# Patient Record
Sex: FEMALE | Race: WHITE | Employment: FULL TIME | URBAN - METROPOLITAN AREA
[De-identification: names, ages, dates, MRNs, and addresses within clinical notes are randomized per-mention and may not be internally consistent; named-entity substitution may affect disease eponyms.]

---

## 2017-02-01 ENCOUNTER — TELEPHONE (OUTPATIENT)
Dept: OBGYN | Facility: CLINIC | Age: 32
End: 2017-02-01

## 2017-02-01 DIAGNOSIS — Z32.01 PREGNANCY EXAMINATION OR TEST, POSITIVE RESULT: Primary | ICD-10-CM

## 2017-02-01 DIAGNOSIS — Z33.1 PREGNANCY, INCIDENTAL: Primary | ICD-10-CM

## 2017-02-02 ENCOUNTER — TELEPHONE (OUTPATIENT)
Dept: OBGYN | Facility: CLINIC | Age: 32
End: 2017-02-02

## 2017-02-02 DIAGNOSIS — Z32.01 PREGNANCY TEST POSITIVE: Primary | ICD-10-CM

## 2017-02-02 DIAGNOSIS — Z32.01 PREGNANCY EXAMINATION OR TEST, POSITIVE RESULT: ICD-10-CM

## 2017-02-02 LAB — B-HCG SERPL-ACNC: 7508 IU/L

## 2017-02-02 PROCEDURE — 36415 COLL VENOUS BLD VENIPUNCTURE: CPT | Performed by: OBSTETRICS & GYNECOLOGY

## 2017-02-02 PROCEDURE — 84702 CHORIONIC GONADOTROPIN TEST: CPT | Performed by: OBSTETRICS & GYNECOLOGY

## 2017-02-02 NOTE — TELEPHONE ENCOUNTER
Discussed result of early IUP with patient    Recommend repeat us weekly until determine if heartbeat forms     Also discussed with patient  Can check quants q 48 hours and if doubling then likely normal   Pregnancy, optional and patient  Will consider.      Patient  Questions answered   Dr. Vianca Almeida,     Obstetrics and Gynecology  [facility] [place] [facility] [place]

## 2017-02-02 NOTE — TELEPHONE ENCOUNTER
Pt had ultrasound done today and was told they only saw a gestational sac. Pt states she spoke to  and she was told to have a repeat U/S done next week and to have a QHCG done today and again in 48 hours.  Orders placed. SHAMIR Avalos RN

## 2017-02-04 DIAGNOSIS — Z32.01 PREGNANCY EXAMINATION OR TEST, POSITIVE RESULT: ICD-10-CM

## 2017-02-04 LAB — B-HCG SERPL-ACNC: NORMAL IU/L

## 2017-02-04 PROCEDURE — 36415 COLL VENOUS BLD VENIPUNCTURE: CPT | Performed by: OBSTETRICS & GYNECOLOGY

## 2017-02-04 PROCEDURE — 84702 CHORIONIC GONADOTROPIN TEST: CPT | Performed by: OBSTETRICS & GYNECOLOGY

## 2017-02-10 ENCOUNTER — RADIANT APPOINTMENT (OUTPATIENT)
Dept: ULTRASOUND IMAGING | Facility: CLINIC | Age: 32
End: 2017-02-10
Attending: FAMILY MEDICINE
Payer: COMMERCIAL

## 2017-02-10 DIAGNOSIS — O41.8X10 SUBCHORIONIC HEMORRHAGE, FIRST TRIMESTER: Primary | ICD-10-CM

## 2017-02-10 DIAGNOSIS — O46.8X1 SUBCHORIONIC HEMORRHAGE, FIRST TRIMESTER: Primary | ICD-10-CM

## 2017-02-10 DIAGNOSIS — Z32.01 PREGNANCY EXAMINATION OR TEST, POSITIVE RESULT: ICD-10-CM

## 2017-02-10 PROCEDURE — 76801 OB US < 14 WKS SINGLE FETUS: CPT | Performed by: OBSTETRICS & GYNECOLOGY

## 2017-02-14 ENCOUNTER — TELEPHONE (OUTPATIENT)
Dept: OBGYN | Facility: CLINIC | Age: 32
End: 2017-02-14

## 2017-02-22 ENCOUNTER — PRENATAL OFFICE VISIT (OUTPATIENT)
Dept: NURSING | Facility: CLINIC | Age: 32
End: 2017-02-22
Payer: COMMERCIAL

## 2017-02-22 DIAGNOSIS — Z34.01 ENCOUNTER FOR SUPERVISION OF NORMAL FIRST PREGNANCY IN FIRST TRIMESTER: Primary | ICD-10-CM

## 2017-02-22 LAB
ABO + RH BLD: NORMAL
ABO + RH BLD: NORMAL
BLD GP AB SCN SERPL QL: NORMAL
BLOOD BANK CMNT PATIENT-IMP: NORMAL
ERYTHROCYTE [DISTWIDTH] IN BLOOD BY AUTOMATED COUNT: 13.5 % (ref 10–15)
HBV SURFACE AG SERPL QL IA: NONREACTIVE
HCT VFR BLD AUTO: 43.7 % (ref 35–47)
HGB BLD-MCNC: 14.4 G/DL (ref 11.7–15.7)
HIV 1+2 AB+HIV1 P24 AG SERPL QL IA: NORMAL
MCH RBC QN AUTO: 31.4 PG (ref 26.5–33)
MCHC RBC AUTO-ENTMCNC: 33 G/DL (ref 31.5–36.5)
MCV RBC AUTO: 95 FL (ref 78–100)
PLATELET # BLD AUTO: 370 10E9/L (ref 150–450)
RBC # BLD AUTO: 4.59 10E12/L (ref 3.8–5.2)
SPECIMEN EXP DATE BLD: NORMAL
WBC # BLD AUTO: 9.3 10E9/L (ref 4–11)

## 2017-02-22 PROCEDURE — 99207 ZZC NO CHARGE NURSE ONLY: CPT

## 2017-02-22 PROCEDURE — 86850 RBC ANTIBODY SCREEN: CPT | Performed by: FAMILY MEDICINE

## 2017-02-22 PROCEDURE — 86762 RUBELLA ANTIBODY: CPT | Performed by: FAMILY MEDICINE

## 2017-02-22 PROCEDURE — 85027 COMPLETE CBC AUTOMATED: CPT | Performed by: FAMILY MEDICINE

## 2017-02-22 PROCEDURE — 86780 TREPONEMA PALLIDUM: CPT | Performed by: FAMILY MEDICINE

## 2017-02-22 PROCEDURE — 36415 COLL VENOUS BLD VENIPUNCTURE: CPT | Performed by: FAMILY MEDICINE

## 2017-02-22 PROCEDURE — 87389 HIV-1 AG W/HIV-1&-2 AB AG IA: CPT | Performed by: FAMILY MEDICINE

## 2017-02-22 PROCEDURE — 87086 URINE CULTURE/COLONY COUNT: CPT | Performed by: FAMILY MEDICINE

## 2017-02-22 PROCEDURE — 86900 BLOOD TYPING SEROLOGIC ABO: CPT | Performed by: FAMILY MEDICINE

## 2017-02-22 PROCEDURE — 86901 BLOOD TYPING SEROLOGIC RH(D): CPT | Performed by: FAMILY MEDICINE

## 2017-02-22 PROCEDURE — 87340 HEPATITIS B SURFACE AG IA: CPT | Performed by: FAMILY MEDICINE

## 2017-02-22 NOTE — MR AVS SNAPSHOT
After Visit Summary   2/22/2017    Joann Patterson    MRN: [id number]           Patient Information     Date Of Birth          1985        Visit Information        Provider Department      2/22/2017 8:00 AM  PRENATAL NURSE [facility]        Today's Diagnoses     Encounter for supervision of normal first pregnancy in first trimester    -  1       Follow-ups after your visit        Your next 10 appointments already scheduled     Feb 28, 2017  3:30 PM CST   Ultrasound with OXUS1   [facility]    [address] [id number] [phone]            Mar 08, 2017  3:30 PM CST   New Prenatal with Vianca Almeida,    [facility]    [address]  [facility] [address] [phone]              Who to contact     If you have questions or need follow up information about today's clinic visit or your schedule please contact [facility] directly at [phone].  Normal or non-critical lab and imaging results will be communicated to you by Snippetshart, letter or phone within 4 business days after the clinic has received the results. If you do not hear from us within 7 days, please contact the clinic through [employer] or phone. If you have a critical or abnormal lab result, we will notify you by phone as soon as possible.  Submit refill requests through [email or web address] or call your pharmacy and they will forward the refill request to us. Please allow 3 business days for your refill to be completed.          Additional Information About Your Visit        MyChart Information     [email or web address] gives you secure access to your electronic health record. If you see a primary care provider, you can also send messages to your care team and make appointments. If you have questions, please call your primary care clinic.  If you do not have a  primary care provider, please call [phone] and they will assist you.        Care EveryWhere ID     This is your Care EveryWhere ID. This could be used by other organizations to access your [place] medical records  [id number]        Your Vitals Were     Last Period                   12/14/2016            Blood Pressure from Last 3 Encounters:   03/30/16 108/88   03/15/16 126/88   03/08/16 112/76    Weight from Last 3 Encounters:   03/30/16 191 lb 9.6 oz (86.9 kg)   03/15/16 190 lb 3.2 oz (86.3 kg)   03/08/16 190 lb 9.6 oz (86.5 kg)              We Performed the Following     ABO/RH TYPE AND SCREEN     Anti Treponema     CBC WITH PLATELETS     Hepatitis B surface antigen     HIV Antigen Antibody Combo     Rubella Antibody IgG Quantitative     Urine Culture Aerobic Bacterial        Primary Care Provider Office Phone # Fax #    Vianca Jacobo Juan Pablo, [phone]       [place] [address] [place] [address]        Thank you!     Thank you for choosing [facility]  for your care. Our goal is always to provide you with excellent care. Hearing back from our patients is one way we can continue to improve our services. Please take a few minutes to complete the written survey that you may receive in the mail after your visit with us. Thank you!             Your Updated Medication List - Protect others around you: Learn how to safely use, store and throw away your medicines at www.disposemymeds.org.          This list is accurate as of: 2/22/17 11:54 AM.  Always use your most recent med list.                   Brand Name Dispense Instructions for use    albuterol 108 (90 BASE) MCG/ACT Inhaler    PROAIR HFA/PROVENTIL HFA/VENTOLIN HFA     Inhale 2 puffs into the lungs every 6 hours       Calcium Carb-Cholecalciferol 1000-800 MG-UNIT Tabs    CALCIUM 1000 + D    100 tablet    Take 1 tablet by mouth daily TAKE WITH FOOD, FOR BONE HEALTH AND FOR VITAMIN D  SUPPLEMENTATION       cholecalciferol 77718 UNITS capsule    VITAMIN D3    40 capsule    Take 1 capsule (50,000 Units) by mouth every 14 days SIG: TAKE 1 CAP TWICE A WEEK FOR 2 WEEKS, THEN ONE CAP EVERY OTHER WEEK, INDICATION: TO TREAT VITAMIN D DEFICIENCY (1ST AND 15TH OF EACH MONTH)       clomiPHENE 50 MG tablet    CLOMID    10 tablet    Take 1 tablet (50 mg) by mouth daily       Cyanocobalamin 5000 MCG/ML Liqd    B-12 SUPER STRENGTH    2 Bottle    Place 1 mL under the tongue daily FOR VITAMIN B12 SUPPLEMENTATION, PLEASE PLACE UNDER THE TONGUE       docusate sodium 100 MG tablet    COLACE    60 tablet    Take 100 mg by mouth daily       fexofenadine 180 MG tablet    ALLEGRA    90 tablet    Take 1 tablet by mouth daily. INDICATION: TO CONTROL NASAL ALLERGIES       * ibuprofen 600 MG tablet    IBU    90 tablet    Take 1 tablet by mouth every 6 hours as needed for pain.       * ibuprofen 800 MG tablet    ADVIL/MOTRIN    90 tablet    Take 1 tablet (800 mg) by mouth every 8 hours as needed for moderate pain       Multi-vitamin Tabs tablet   Generic drug:  multivitamin, therapeutic with minerals      Take 1 tablet by mouth daily.       NEXPLANON 68 MG Impl   Generic drug:  etonogestrel     1 each    1 each (68 mg) by Subdermal route once INSERTED APRIL 2015.       ondansetron 8 MG tablet    ZOFRAN    21 tablet    Take 1 tablet (8 mg) by mouth every 8 hours as needed for nausea       oxyCODONE 5 MG IR tablet    ROXICODONE    30 tablet    Take 1 tablet (5 mg) by mouth every 4 hours as needed for moderate to severe pain       * Notice:  This list has 2 medication(s) that are the same as other medications prescribed for you. Read the directions carefully, and ask your doctor or other care provider to review them with you.

## 2017-02-22 NOTE — NURSING NOTE
"NEW PRENATAL EDUCATION  The following encounter information was actually performed during a Nurseprenatal education class on 2/22/17 by SHAMIR Avalos RN  and entered at a later date.  Patient given information for prenatal education along with \"The Expectant Family\" booklet.   Pt states she did receive her flu shot this season.    "

## 2017-02-23 LAB
BACTERIA SPEC CULT: NORMAL
MICRO REPORT STATUS: NORMAL
RUBV IGG SERPL IA-ACNC: 13 IU/ML
SPECIMEN SOURCE: NORMAL
T PALLIDUM IGG+IGM SER QL: NEGATIVE

## 2017-03-08 ENCOUNTER — PRENATAL OFFICE VISIT (OUTPATIENT)
Dept: OBGYN | Facility: CLINIC | Age: 32
End: 2017-03-08
Payer: COMMERCIAL

## 2017-03-08 VITALS
WEIGHT: 184 LBS | HEIGHT: 63 IN | BODY MASS INDEX: 32.6 KG/M2 | TEMPERATURE: 98.6 F | SYSTOLIC BLOOD PRESSURE: 104 MMHG | DIASTOLIC BLOOD PRESSURE: 70 MMHG

## 2017-03-08 DIAGNOSIS — Z34.00 SUPERVISION OF NORMAL FIRST PREGNANCY, ANTEPARTUM: Primary | ICD-10-CM

## 2017-03-08 DIAGNOSIS — R11.0 NAUSEA: ICD-10-CM

## 2017-03-08 PROCEDURE — 87591 N.GONORRHOEAE DNA AMP PROB: CPT | Performed by: FAMILY MEDICINE

## 2017-03-08 PROCEDURE — 87491 CHLMYD TRACH DNA AMP PROBE: CPT | Performed by: FAMILY MEDICINE

## 2017-03-08 PROCEDURE — 99207 ZZC FIRST OB VISIT: CPT | Performed by: FAMILY MEDICINE

## 2017-03-08 RX ORDER — PROCHLORPERAZINE MALEATE 10 MG
10 TABLET ORAL EVERY 6 HOURS PRN
Qty: 40 TABLET | Refills: 1 | Status: SHIPPED | OUTPATIENT
Start: 2017-03-08 | End: 2017-09-20

## 2017-03-08 NOTE — NURSING NOTE
"12w0d    Chief Complaint   Patient presents with     Prenatal Care     NPN--pap not due--had US 2/28/17       Initial /70  Temp 98.6  F (37  C) (Oral)  Ht 5' 3\" (1.6 m)  Wt 184 lb (83.5 kg)  LMP 12/14/2016  BMI 32.59 kg/m2 Estimated body mass index is 32.59 kg/(m^2) as calculated from the following:    Height as of this encounter: 5' 3\" (1.6 m).    Weight as of this encounter: 184 lb (83.5 kg).  Medication Reconciliation: complete   Lulu Gonzalez CMA      "

## 2017-03-08 NOTE — PATIENT INSTRUCTIONS
Return to clinic:  every 4 weeks till 30 weeks, then every 2 weeks till 36 weeks, then weekly till delivery    Dr. Vianca Almeida,     Obstetrics and Gynecology  [facility] [place] [facility] [place]

## 2017-03-08 NOTE — PROGRESS NOTES
Joann Patterson is a 31 year old  @ 10w2d wks Estimated Date of Delivery: Oct 2, 2017 who presents to the clinic for an new ob visit.       Estimated Date of Delivery: Oct 2, 2017   Reviewed nurse intake visit on 17  Concerns: She notes that she miscarried very early in December, not requiring surgery prior to this pregnancy. She notes that the baby's father is excited about the pregnancy.   She notes that she has been vary nauseous, and has been trying vitamin B, Unisom, and Zofran. She notes that the Zofran was going well, but for the past week she has been even more nauseous. She has been eating mostly saltines and BLT sandwiches. She has been using flinstone vitamins because she is not tolerating her prenatal vitamins. Additionally, she has been constipated. She has been trying not to take medication, but has been using a fiber gummy daily and has been trying to eat high fiber foods, but her nausea is preventing her from doing this. She also has been using metamucil when she can feel the constipation come on, will try Miralax on day 3 of no BM, and then has tried a women's stool softener and even has needed to do 2 suppositories. She also notes that she has been having a lot of breast tenderness as well.     Patient Active Problem List   Diagnosis     CARDIOVASCULAR SCREENING; LDL GOAL LESS THAN 160     Intermittent asthma     Endometriosis     Vitamin D deficiency     Vitamin B12 deficiency without anemia     Supervision of normal first pregnancy     Past Medical History   Diagnosis Date     Asthma      Seasonal     Chronic pain      Abdominal pain with mensturation.     Endometriosis 2008     Other chronic pain      endometriosis     PONV (postoperative nausea and vomiting)      Past Surgical History   Procedure Laterality Date     Gyn surgery  2009     laparoscopic with CO2 laser     Head & neck surgery  2005     wisdom teeth extraction     Ent surgery  2010     left nasal suction catarization &  blood vessel removal     Davinci pelvic procedure  11/6/2012     Procedure: DAVINCI PELVIC PROCEDURE;  Davinci Assisted Laparoscopic Endometriosis Ressection, chromotubation;  Surgeon: Vianca Almeida DO;  Location:  OR     Nexplanon  8/20/15     Davinci pelvic procedure Right 3/15/2016     Procedure: DAVINCI PELVIC PROCEDURE;  Surgeon: Vianca Almeida DO;  Location: [facility] OR     Current Outpatient Prescriptions   Medication Sig Dispense Refill     prochlorperazine (COMPAZINE) 10 MG tablet Take 1 tablet (10 mg) by mouth every 6 hours as needed for nausea or vomiting 40 tablet 1     ondansetron (ZOFRAN) 8 MG tablet Take 1 tablet (8 mg) by mouth every 8 hours as needed for nausea 21 tablet 3     docusate sodium (COLACE) 100 MG tablet Take 100 mg by mouth daily 60 tablet 1     ====================================================  PERSONAL/SOCIAL HISTORY  Social History     Social History     Marital status: Single     Spouse name: N/A     Number of children: 0     Years of education: N/A     Occupational History      [employer]     Social History Main Topics     Smoking status: Former Smoker     Packs/day: 0.25     Years: 10.00     Types: Cigarettes     Quit date: 9/20/2012     Smokeless tobacco: Never Used     Alcohol use 0.0 oz/week     0 Standard drinks or equivalent per week      Comment: 1 glass of wine every other day     Drug use: No     Sexual activity: Not Currently     Partners: Male     Birth control/ protection: Implant      Comment: Nexplanon 8/20/15     Other Topics Concern     Blood Transfusions No     Social History Narrative    Joann lives alone      =====================================================   REVIEW OF SYSTEMS  C: NEGATIVE for fever, chills, change in weight  I: NEGATIVE for worrisome rashes, moles or lesions  E: NEGATIVE for vision changes or irritation  ENT: NEGATIVE for ear, mouth and throat problems  R: NEGATIVE for significant cough or SOB  B: NEGATIVE  "for masses or discharge POSITIVE for breast tenderness  CV: NEGATIVE for chest pain, palpitations or peripheral edema  GI: NEGATIVE for abdominal pain or heartburn POSITIVE for nausea and vomiting, and constipation   : NEGATIVE for unusual urinary or vaginal symptoms. POSITIVE For increased urinary frequency   M: NEGATIVE for significant arthralgias or myalgia  N: NEGATIVE for weakness, dizziness or paresthesias  P: NEGATIVE for changes in mood or affect    This document serves as a record of the services and decisions personally performed and made by Dr. Vianca Almeida DO. It was created on her behalf by Adenike Glez, a trained medical scribe. The creation of this document is based the provider's statements to the medical scribe.  Adenike Glez March 8, 2017 3:34 PM     ====================================================  PHYSICAL EXAM:  /70  Temp 98.6  F (37  C) (Oral)  Ht 5' 3\" (1.6 m)  Wt 184 lb (83.5 kg)  LMP 12/14/2016  BMI 32.59 kg/m2        GENERAL:  Pleasant pregnant female, alert, well groomed.  SKIN:  Warm and dry, without lesions or rashes  HEAD: Symmetrical features.  EYES:  PERRLA,   MOUTH:  Buccal mucosa pink, moist without lesions.    NECK:  Thyroid without enlargement and nodules.  Lymph nodes not palpable.   LUNGS:  Clear to auscultation.  BREAST:  Symmetrical.  No dominant, fixed or suspicious masses are noted.  No skin or nipple changes or axillary nodes.  Self exam is taught and encouraged.  Nipples everted.      HEART:  RRR without murmur.  ABDOMEN: Soft without masses , tenderness or organomegaly.  No CVA tenderness. No scars noted..   MUSCULOSKELETAL:  Full range of motion  EXTREMITIES:  No edema. No significant varicosities.   GENITALIA:  BUS WNL, no lesions noted   VAGINA:  Pink, normal rugae and discharge normal and physiologic,   CERVIX:  smooth, without discharge or CMT and nulliparous os,   firm/ closed 4 cm long.  UTERUS: Anteverted, nontender 12 weeks in size.  ADNEXA: " Without masses or tenderness  PELVIS:   Adequate, Pelvis proven to -pelvis not tested.    =========================================  ICSI Routine Prenatal Carfe Guideline  ASSESSMENT/PLAN:  Joann Patterson is a 31 year old  @ 10w2d wks EGA with EDC Estimated Date of Delivery: Oct 2, 2017 who presents to the clinic for an new ob visit.         1) Intrauterine pregnancy:  Nausea, will try compazine, zofran has not been effective. I would like her to supplement magnesium as well.   2) Fetal heart tones 178  3) Prenatal labs normal and pending   4) EDUCATION :    RECOMMENDED WEIGHT GAIN: 25-35 lbs.  Instructed on best evidence for: weight gain for her BMI for pregnancy; healthy diet and foods to avoid; exercise and activity during pregnancy;avoiding exposure to toxoplasmosis; and maintenance of a generally healthy lifestyle.   Discussed the harms, benefits, side effects and alternative therapies for current prescribed and OTC medications.  5) Ultrasound by bedside   6) Return to clinic in 4 weeks   7) Counseled about screening tests (1st trimester screen and targeted anatomy scan and AFP and quad screen if first trimester screening not done) and invasive definitive testing (chorionic villus sampling and genetic amniocentesis).  Patient wishes to discuss the screening with the father.     The information in this document, created by the medical scribe for me, accurately reflects the services I personally performed and the decisions made by me. I have reviewed and approved this document for accuracy prior to leaving the patient care area.  3/8/2017 3:34 PM           Dr. Vianca Almeida, DO    OB/GYN   [facility]

## 2017-03-08 NOTE — MR AVS SNAPSHOT
After Visit Summary   3/8/2017    Joann Patterson    MRN: [id number]           Patient Information     Date Of Birth          1985        Visit Information        Provider Department      3/8/2017 3:30 PM Vianca Almeida, DO [facility]        Today's Diagnoses     Supervision of normal first pregnancy, antepartum    -  1    Nausea          Care Instructions    Return to clinic:  every 4 weeks till 30 weeks, then every 2 weeks till 36 weeks, then weekly till delivery    Dr. Vianca Almeida, DO    Obstetrics and Gynecology  [facility]               Follow-ups after your visit        Follow-up notes from your care team     Return in about 4 weeks (around 4/5/2017).      Who to contact     If you have questions or need follow up information about today's clinic visit or your schedule please contact [facility] directly at [phone].  Normal or non-critical lab and imaging results will be communicated to you by Sideris Pharmaceuticalshart, letter or phone within 4 business days after the clinic has received the results. If you do not hear from us within 7 days, please contact the clinic through S[employer]shart or phone. If you have a critical or abnormal lab result, we will notify you by phone as soon as possible.  Submit refill requests through [employer] or call your pharmacy and they will forward the refill request to us. Please allow 3 business days for your refill to be completed.          Additional Information About Your Visit        MyChart Information     [employer] gives you secure access to your electronic health record. If you see a primary care provider, you can also send messages to your care team and make appointments. If you have questions, please call your primary care clinic.  If you do not have a primary care provider, please call [phone] and they will assist you.        Care EveryWhere ID     This is your Care EveryWhere ID. This could be  "used by other organizations to access your [place] medical records  [id number]        Your Vitals Were     Temperature Height Last Period BMI (Body Mass Index)          98.6  F (37  C) (Oral) 5' 3\" (1.6 m) 12/14/2016 32.59 kg/m2         Blood Pressure from Last 3 Encounters:   03/08/17 104/70   03/30/16 108/88   03/15/16 126/88    Weight from Last 3 Encounters:   03/08/17 184 lb (83.5 kg)   03/30/16 191 lb 9.6 oz (86.9 kg)   03/15/16 190 lb 3.2 oz (86.3 kg)              We Performed the Following     CHLAMYDIA TRACHOMATIS PCR     NEISSERIA GONORRHOEA PCR          Today's Medication Changes          These changes are accurate as of: 3/8/17  3:48 PM.  If you have any questions, ask your nurse or doctor.               Start taking these medicines.        Dose/Directions    prochlorperazine 10 MG tablet   Commonly known as:  COMPAZINE   Used for:  Supervision of normal first pregnancy, antepartum, Nausea   Started by:  Vianca Almeida DO        Dose:  10 mg   Take 1 tablet (10 mg) by mouth every 6 hours as needed for nausea or vomiting   Quantity:  40 tablet   Refills:  1            Where to get your medicines      These medications were sent to [facility] [place] [address]     Phone:  [phone]     prochlorperazine 10 MG tablet                Primary Care Provider Office Phone # Fax #    Vianca Almeida [phone]       [facility] [address] [place] [id number]        Thank you!     Thank you for choosing [facility]  for your care. Our goal is always to provide you with excellent care. Hearing back from our patients is one way we can continue to improve our services. Please take a few minutes to complete the written survey that you may receive in the mail after your visit with us. Thank you!             Your Updated Medication List - Protect " others around you: Learn how to safely use, store and throw away your medicines at www.disposemymeds.org.          This list is accurate as of: 3/8/17  3:48 PM.  Always use your most recent med list.                   Brand Name Dispense Instructions for use    docusate sodium 100 MG tablet    COLACE    60 tablet    Take 100 mg by mouth daily       ondansetron 8 MG tablet    ZOFRAN    21 tablet    Take 1 tablet (8 mg) by mouth every 8 hours as needed for nausea       prochlorperazine 10 MG tablet    COMPAZINE    40 tablet    Take 1 tablet (10 mg) by mouth every 6 hours as needed for nausea or vomiting

## 2017-03-09 LAB
C TRACH DNA SPEC QL NAA+PROBE: NORMAL
N GONORRHOEA DNA SPEC QL NAA+PROBE: NORMAL
SPECIMEN SOURCE: NORMAL
SPECIMEN SOURCE: NORMAL

## 2017-03-20 ENCOUNTER — MYC MEDICAL ADVICE (OUTPATIENT)
Dept: OBGYN | Facility: CLINIC | Age: 32
End: 2017-03-20

## 2017-03-20 DIAGNOSIS — R11.0 NAUSEA: Primary | ICD-10-CM

## 2017-03-20 RX ORDER — METOCLOPRAMIDE 10 MG/1
10 TABLET ORAL
Qty: 120 TABLET | Refills: 1 | Status: SHIPPED | OUTPATIENT
Start: 2017-03-20 | End: 2017-04-12

## 2017-04-12 ENCOUNTER — PRENATAL OFFICE VISIT (OUTPATIENT)
Dept: OBGYN | Facility: CLINIC | Age: 32
End: 2017-04-12
Payer: COMMERCIAL

## 2017-04-12 VITALS
WEIGHT: 180.5 LBS | SYSTOLIC BLOOD PRESSURE: 100 MMHG | HEIGHT: 63 IN | DIASTOLIC BLOOD PRESSURE: 60 MMHG | BODY MASS INDEX: 31.98 KG/M2

## 2017-04-12 DIAGNOSIS — Z34.00 SUPERVISION OF NORMAL FIRST PREGNANCY, ANTEPARTUM: Primary | ICD-10-CM

## 2017-04-12 PROCEDURE — 99207 ZZC PRENATAL VISIT: CPT | Performed by: FAMILY MEDICINE

## 2017-04-12 NOTE — MR AVS SNAPSHOT
After Visit Summary   4/12/2017    Joann Patterson    MRN: [id number]           Patient Information     Date Of Birth          1985        Visit Information        Provider Department      4/12/2017 2:45 PM Vianca Almeida, DO [facility]        Today's Diagnoses     Supervision of normal first pregnancy, antepartum    -  1      Care Instructions    They will call you with us schedule   Return to clinic in 4 weeks     Dr. Vianca Almeida, DO    Obstetrics and Gynecology  [facility] [place]               Follow-ups after your visit        Follow-up notes from your care team     Return in about 4 weeks (around 5/10/2017).      Future tests that were ordered for you today     Open Future Orders        Priority Expected Expires Ordered    US OB > 14 Weeks Complete Single Routine  4/12/2018 4/12/2017            Who to contact     If you have questions or need follow up information about today's clinic visit or your schedule please contact [facility] directly at [phone].  Normal or non-critical lab and imaging results will be communicated to you by MyChart, letter or phone within 4 business days after the clinic has received the results. If you do not hear from us within 7 days, please contact the clinic through Dibbzhart or phone. If you have a critical or abnormal lab result, we will notify you by phone as soon as possible.  Submit refill requests through [employer] or call your pharmacy and they will forward the refill request to us. Please allow 3 business days for your refill to be completed.          Additional Information About Your Visit        MyChart Information     [employer] gives you secure access to your electronic health record. If you see a primary care provider, you can also send messages to your care team and make appointments. If you have questions, please call your primary care clinic.  If you do not have a primary care  "provider, please call [phone] and they will assist you.        Care EveryWhere ID     This is your Care EveryWhere ID. This could be used by other organizations to access your [place] medical records  [id number]        Your Vitals Were     Height Last Period BMI (Body Mass Index)             5' 3\" (1.6 m) 12/14/2016 31.97 kg/m2          Blood Pressure from Last 3 Encounters:   04/12/17 100/60   03/08/17 104/70   03/30/16 108/88    Weight from Last 3 Encounters:   04/12/17 180 lb 8 oz (81.9 kg)   03/08/17 184 lb (83.5 kg)   03/30/16 191 lb 9.6 oz (86.9 kg)                 Today's Medication Changes          These changes are accurate as of: 4/12/17  3:08 PM.  If you have any questions, ask your nurse or doctor.               Stop taking these medicines if you haven't already. Please contact your care team if you have questions.     metoclopramide 10 MG tablet   Commonly known as:  REGLAN                    Primary Care Provider Office Phone # Fax #    Vianca Almeida,  [phone]       [facility] [address] [place] [address]        Thank you!     Thank you for choosing [facility]  for your care. Our goal is always to provide you with excellent care. Hearing back from our patients is one way we can continue to improve our services. Please take a few minutes to complete the written survey that you may receive in the mail after your visit with us. Thank you!             Your Updated Medication List - Protect others around you: Learn how to safely use, store and throw away your medicines at www.disposemymeds.org.          This list is accurate as of: 4/12/17  3:08 PM.  Always use your most recent med list.                   Brand Name Dispense Instructions for use    docusate sodium 100 MG tablet    COLACE    60 tablet    Take 100 mg by mouth daily       ondansetron 8 MG tablet    ZOFRAN    21 tablet    Take 1 tablet (8 mg) by mouth every 8 hours as " needed for nausea       prochlorperazine 10 MG tablet    COMPAZINE    40 tablet    Take 1 tablet (10 mg) by mouth every 6 hours as needed for nausea or vomiting

## 2017-04-12 NOTE — PATIENT INSTRUCTIONS
They will call you with us schedule   Return to clinic in 4 weeks     Dr. Vianca Almeida, DO    Obstetrics and Gynecology  [facility] [place] [facility] [place]

## 2017-04-12 NOTE — NURSING NOTE
"Chief Complaint   Patient presents with     Prenatal Care     still having nausea--headaches--bad constipation--taking colace and it works       Initial /60  Ht 5' 3\" (1.6 m)  Wt 180 lb 8 oz (81.9 kg)  LMP 12/14/2016  BMI 31.97 kg/m2 Estimated body mass index is 31.97 kg/(m^2) as calculated from the following:    Height as of this encounter: 5' 3\" (1.6 m).    Weight as of this encounter: 180 lb 8 oz (81.9 kg).  Medication Reconciliation: complete   Lulu Gonzalez CMA      "

## 2017-04-12 NOTE — PROGRESS NOTES
CC: Joann Patterson is a 31 year old female here for routine prenatal visit @ 15w2d Estimated Date of Delivery: Oct 2, 2017   HPI: she notes that she is doing well, but notes that today she is more nauseous than normal and has vomited a couple of times. Some days are better than other. She declines  testing, except for US. She also notes that she has been using colace and miralax for her constipation. She continues to use her fiber gummies. She has been having some pulling and pressure in her abdomen.     This document serves as a record of the services and decisions personally performed and made by Dr. Vianca Almeida DO. It was created on her behalf by Adenike Glez, a trained medical scribe. The creation of this document is based the provider's statements to the medical scribe.  Adenike Glez 2017 2:45 PM    See OB flowsheet    No vaginal bleeding, no LOF, no contractions     ASSESSMENT/PLAN:  1) nausea continues, rx previously provided.   2) colace and miralax for constipation  3) otherwise doing well  4) Follow up in 4 weeks     The information in this document, created by the medical scribe for me, accurately reflects the services I personally performed and the decisions made by me. I have reviewed and approved this document for accuracy prior to leaving the patient care area.  2017 2:45 PM         Dr. Vianca Almeida DO

## 2017-05-12 ENCOUNTER — RADIANT APPOINTMENT (OUTPATIENT)
Dept: ULTRASOUND IMAGING | Facility: CLINIC | Age: 32
End: 2017-05-12
Attending: FAMILY MEDICINE
Payer: COMMERCIAL

## 2017-05-12 DIAGNOSIS — Z34.00 SUPERVISION OF NORMAL FIRST PREGNANCY, ANTEPARTUM: ICD-10-CM

## 2017-05-12 PROCEDURE — 76805 OB US >/= 14 WKS SNGL FETUS: CPT | Performed by: OBSTETRICS & GYNECOLOGY

## 2017-05-16 ENCOUNTER — PRE VISIT (OUTPATIENT)
Dept: MATERNAL FETAL MEDICINE | Facility: CLINIC | Age: 32
End: 2017-05-16

## 2017-05-16 ENCOUNTER — TELEPHONE (OUTPATIENT)
Dept: OBGYN | Facility: CLINIC | Age: 32
End: 2017-05-16

## 2017-05-16 NOTE — TELEPHONE ENCOUNTER
Spoke to pt.  She had received a call from the clinic.  I was not able to see who called her, but we went over u/s results and the reason for referral to [facility].  Pt understands and has appt tomorrow.    Rosana Tucker R.N.

## 2017-05-17 ENCOUNTER — HOSPITAL ENCOUNTER (OUTPATIENT)
Dept: ULTRASOUND IMAGING | Facility: CLINIC | Age: 32
Discharge: HOME | End: 2017-05-17
Attending: FAMILY MEDICINE | Admitting: FAMILY MEDICINE
Payer: COMMERCIAL

## 2017-05-17 ENCOUNTER — OFFICE VISIT (OUTPATIENT)
Dept: MATERNAL FETAL MEDICINE | Facility: CLINIC | Age: 32
End: 2017-05-17
Attending: FAMILY MEDICINE
Payer: COMMERCIAL

## 2017-05-17 ENCOUNTER — PRENATAL OFFICE VISIT (OUTPATIENT)
Dept: OBGYN | Facility: CLINIC | Age: 32
End: 2017-05-17
Payer: COMMERCIAL

## 2017-05-17 VITALS
BODY MASS INDEX: 31.96 KG/M2 | WEIGHT: 180.4 LBS | HEIGHT: 63 IN | SYSTOLIC BLOOD PRESSURE: 110 MMHG | DIASTOLIC BLOOD PRESSURE: 68 MMHG

## 2017-05-17 DIAGNOSIS — Z34.02 ENCOUNTER FOR SUPERVISION OF NORMAL FIRST PREGNANCY IN SECOND TRIMESTER: Primary | ICD-10-CM

## 2017-05-17 DIAGNOSIS — Z03.73 SUSPECTED FETAL ANOMALY NOT FOUND: Primary | ICD-10-CM

## 2017-05-17 DIAGNOSIS — B37.31 CANDIDIASIS OF VULVA AND VAGINA: ICD-10-CM

## 2017-05-17 DIAGNOSIS — O26.90 PREGNANCY RELATED CONDITION, UNSPECIFIED TRIMESTER: ICD-10-CM

## 2017-05-17 DIAGNOSIS — Z36.3 ENCOUNTER FOR ROUTINE SCREENING FOR MALFORMATION USING ULTRASONICS: ICD-10-CM

## 2017-05-17 PROCEDURE — 76811 OB US DETAILED SNGL FETUS: CPT

## 2017-05-17 PROCEDURE — 99207 ZZC PRENATAL VISIT: CPT | Performed by: FAMILY MEDICINE

## 2017-05-17 RX ORDER — FLUCONAZOLE 150 MG/1
150 TABLET ORAL
Qty: 4 TABLET | Refills: 3 | Status: SHIPPED | OUTPATIENT
Start: 2017-05-17 | End: 2020-07-08

## 2017-05-17 NOTE — MR AVS SNAPSHOT
After Visit Summary   5/17/2017    Joann Patterson    MRN: [id number]           Patient Information     Date Of Birth          1985        Visit Information        Provider Department      5/17/2017 10:45 AM Radha Finn MD [facility] Maternal Fetal Medicine [facility]        Today's Diagnoses     Suspected fetal anomaly not found    -  1    Encounter for routine screening for malformation using ultrasonics           Follow-ups after your visit        Your next 10 appointments already scheduled     May 17, 2017  2:15 PM CDT   ESTABLISHED PRENATAL with Vianca Almeida,    [facility]    [address]  [facility] [address]   [phone]            Jun 14, 2017  3:00 PM CDT   ESTABLISHED PRENATAL with Vianca Almeida DO   [facility]    [address]  [facility] [address]   [phone]            Jul 12, 2017  3:00 PM CDT   ESTABLISHED PRENATAL with Vianca Almeida DO   [facility]    [address]  [facility] [address]   [phone]              Future tests that were ordered for you today     Open Future Orders        Priority Expected Expires Ordered    M US Comprehensive Single Routine  3/16/2018 5/16/2017            Who to contact     If you have questions or need follow up information about today's clinic visit or your schedule please contact [facility] MATERNAL FETAL MEDICINE [facility] directly at [phone].  Normal or non-critical lab and imaging results will be communicated to you by MyChart, letter or phone within 4 business days after the clinic has received the results. If you do not hear from us within 7 days, please contact the clinic through MyChart or phone. If you have a critical or abnormal lab result, we will notify you by phone as soon as possible.  Submit refill requests  through [employer] or call your pharmacy and they will forward the refill request to us. Please allow 3 business days for your refill to be completed.          Additional Information About Your Visit        [employer] Information     [employer] gives you secure access to your electronic health record. If you see a primary care provider, you can also send messages to your care team and make appointments. If you have questions, please call your primary care clinic.  If you do not have a primary care provider, please call [phone] and they will assist you.        Care EveryWhere ID     This is your Care EveryWhere ID. This could be used by other organizations to access your [place] medical records  [id number]        Your Vitals Were     Last Period                   12/14/2016            Blood Pressure from Last 3 Encounters:   04/12/17 100/60   03/08/17 104/70   03/30/16 108/88    Weight from Last 3 Encounters:   04/12/17 81.9 kg (180 lb 8 oz)   03/08/17 83.5 kg (184 lb)   03/30/16 86.9 kg (191 lb 9.6 oz)              Today, you had the following     No orders found for display       Primary Care Provider Office Phone # Fax #    Vianca Almeida, [phone]       [facility] [address] [place] [address]        Thank you!     Thank you for choosing [facility]ERNAL FETAL [facility]IC[facility]  for your care. Our goal is always to provide you with excellent care. Hearing back from our patients is one way we can continue to improve our services. Please take a few minutes to complete the written survey that you may receive in the mail after your visit with us. Thank you!             Your Updated Medication List - Protect others around you: Learn how to safely use, store and throw away your medicines at www.disposemymeds.org.          This list is accurate as of: 5/17/17 11:24 AM.  Always use your most recent med list.                   Brand Name Dispense Instructions for use     docusate sodium 100 MG tablet    COLACE    60 tablet    Take 100 mg by mouth daily       ondansetron 8 MG tablet    ZOFRAN    21 tablet    Take 1 tablet (8 mg) by mouth every 8 hours as needed for nausea       prochlorperazine 10 MG tablet    COMPAZINE    40 tablet    Take 1 tablet (10 mg) by mouth every 6 hours as needed for nausea or vomiting

## 2017-05-17 NOTE — PROGRESS NOTES
Please see ultrasound report under imaging tab for details on ultrasound performed today.    Radha Finn MD  , OB/GYN  Maternal-Fetal Medicine  [email or web address]  [phone] (Academic office)  [phone] (Pager)

## 2017-05-17 NOTE — PROGRESS NOTES
"CC: Joann Patterson is a 31 year old female here for routine prenatal visit   31 year old y/o  @ 20w2d with Estimated Date of Delivery: Oct 2, 2017   HPI: Referred to [facility] for US and reports all was well. Mentioned nausea has subsided. Was doing colace every other day and will supplement with Miralax if necessary.     Patient states she is on day 3 of monistat for a yeast infection and reports she still has uncomfortable sx.      This document serves as a record of the services and decisions personally performed and made by Vianca Almeida DO. It was created on his/her behalf by Kesha Zhong, a trained medical scribe. The creation of this document is based the provider's statements to the medical scribe.  Kenneth Zhong 2:23 PM, May 17, 2017    /68  Ht 1.6 m (5' 3\")  Wt 81.8 kg (180 lb 6.4 oz)  LMP 2016  BMI 31.96 kg/m2  See OB flowsheet    1) concerns: yeast infection, Rx Diflucan 150 mg po  2) Colace and miralax for constipation  3) Couldn't fully see bladder on ob us, M us normal       The information in this document, created by the medical scribe for me, accurately reflects the services I personally performed and the decisions made by me. I have reviewed and approved this document for accuracy prior to leaving the patient care area.  Vianca Almeida DO  2:22 PM, 17    [place]    "

## 2017-05-17 NOTE — NURSING NOTE
"20w2d    Chief Complaint   Patient presents with     Prenatal Care     MFM appt today--doing OTC treatment for yeast infection--sx are still there       Initial /68  Ht 5' 3\" (1.6 m)  Wt 180 lb 6.4 oz (81.8 kg)  LMP 12/14/2016  BMI 31.96 kg/m2 Estimated body mass index is 31.96 kg/(m^2) as calculated from the following:    Height as of this encounter: 5' 3\" (1.6 m).    Weight as of this encounter: 180 lb 6.4 oz (81.8 kg).  Medication Reconciliation: complete   Lulu Gonzalez CMA      "

## 2017-05-17 NOTE — PATIENT INSTRUCTIONS
Return in 4 weeks   Dr. Vianca Almeida,     Obstetrics and Gynecology  [facility] [place]

## 2017-05-17 NOTE — MR AVS SNAPSHOT
After Visit Summary   5/17/2017    Joann Patterson    MRN: [id number]           Patient Information     Date Of Birth          1985        Visit Information        Provider Department      5/17/2017 2:15 PM Vianca Almeida,  [facility]        Today's Diagnoses     Encounter for supervision of normal first pregnancy in second trimester    -  1      Care Instructions    Return in 4 weeks   Dr. Vianca Almeida,     Obstetrics and Gynecology  [facility] [place]               Follow-ups after your visit        Your next 10 appointments already scheduled     Jun 14, 2017  3:00 PM CDT   ESTABLISHED PRENATAL with [facility]ian[facility]ida DO   [facility]    [address]  [facility] [address] [phone]            Jul 12, 2017  3:00 PM CDT   ESTABLISHED PRENATAL with Vianca Almeida DO   [facility]    [address]  [facility] [address] [phone]              Future tests that were ordered for you today     Open Future Orders        Priority Expected Expires Ordered    [facility] Comprehensive Single Routine  3/16/2018 5/16/2017            Who to contact     If you have questions or need follow up information about today's clinic visit or your schedule please contact [facility] directly at [phone].  Normal or non-critical lab and imaging results will be communicated to you by MyChart, letter or phone within 4 business days after the clinic has received the results. If you do not hear from us within 7 days, please contact the clinic through MyChart or phone. If you have a critical or abnormal lab result, we will notify you by phone as soon as possible.  Submit refill requests through [employer] or call your pharmacy and they will forward the refill request to us. Please allow 3 business days for your refill to be  "completed.          Additional Information About Your Visit        [employer] Information     [employer] gives you secure access to your electronic health record. If you see a primary care provider, you can also send messages to your care team and make appointments. If you have questions, please call your primary care clinic.  If you do not have a primary care provider, please call [phone] and they will assist you.        Care EveryWhere ID     This is your Care EveryWhere ID. This could be used by other organizations to access your [place] medical records  [id number]        Your Vitals Were     Height Last Period BMI (Body Mass Index)             5' 3\" (1.6 m) 12/14/2016 31.96 kg/m2          Blood Pressure from Last 3 Encounters:   05/17/17 110/68   04/12/17 100/60   03/08/17 104/70    Weight from Last 3 Encounters:   05/17/17 180 lb 6.4 oz (81.8 kg)   04/12/17 180 lb 8 oz (81.9 kg)   03/08/17 184 lb (83.5 kg)              Today, you had the following     No orders found for display       Primary Care Provider Office Phone # Fax #    Vianca Jacobo Jeniarrons,  [phone]       [facility] [address] [place] [address]        Thank you!     Thank you for choosing [facility]  for your care. Our goal is always to provide you with excellent care. Hearing back from our patients is one way we can continue to improve our services. Please take a few minutes to complete the written survey that you may receive in the mail after your visit with us. Thank you!             Your Updated Medication List - Protect others around you: Learn how to safely use, store and throw away your medicines at www.disposemymeds.org.          This list is accurate as of: 5/17/17  2:21 PM.  Always use your most recent med list.                   Brand Name Dispense Instructions for use    docusate sodium 100 MG tablet    COLACE    60 tablet    Take 100 mg by mouth daily       ondansetron 8 MG " tablet    ZOFRAN    21 tablet    Take 1 tablet (8 mg) by mouth every 8 hours as needed for nausea       prochlorperazine 10 MG tablet    COMPAZINE    40 tablet    Take 1 tablet (10 mg) by mouth every 6 hours as needed for nausea or vomiting

## 2017-06-14 ENCOUNTER — PRENATAL OFFICE VISIT (OUTPATIENT)
Dept: OBGYN | Facility: CLINIC | Age: 32
End: 2017-06-14
Payer: COMMERCIAL

## 2017-06-14 VITALS
BODY MASS INDEX: 32.34 KG/M2 | SYSTOLIC BLOOD PRESSURE: 100 MMHG | WEIGHT: 182.5 LBS | HEIGHT: 63 IN | DIASTOLIC BLOOD PRESSURE: 70 MMHG

## 2017-06-14 DIAGNOSIS — M54.41 ACUTE BILATERAL LOW BACK PAIN WITH RIGHT-SIDED SCIATICA: ICD-10-CM

## 2017-06-14 DIAGNOSIS — Z34.02 ENCOUNTER FOR SUPERVISION OF NORMAL FIRST PREGNANCY IN SECOND TRIMESTER: Primary | ICD-10-CM

## 2017-06-14 PROCEDURE — 99207 ZZC PRENATAL VISIT: CPT | Performed by: FAMILY MEDICINE

## 2017-06-14 NOTE — NURSING NOTE
"24w2d    Chief Complaint   Patient presents with     Prenatal Care     right side sciatica pain--started a week ago       Initial /70  Ht 5' 3\" (1.6 m)  Wt 182 lb 8 oz (82.8 kg)  LMP 12/14/2016  BMI 32.33 kg/m2 Estimated body mass index is 32.33 kg/(m^2) as calculated from the following:    Height as of this encounter: 5' 3\" (1.6 m).    Weight as of this encounter: 182 lb 8 oz (82.8 kg).  Medication Reconciliation: complete   Lulu Gonzalez CMA      "

## 2017-06-14 NOTE — PROGRESS NOTES
"CC: Here for routine prenatal visit   31 year old y/o  @ 24w2d with Estimated Date of Delivery: Oct 2, 2017   HPI: Reports of sciatica pain on the right side, otherwise doing well. Minimal acid reflux. Patient reports she lost 11 pounds and gained 3.     This document serves as a record of the services and decisions personally performed and made by Vianca Almeida DO. It was created on his/her behalf by Kesha Zhong, a trained medical scribe. The creation of this document is based the provider's statements to the medical scribe.  Myraibgaudencio Zhong 3:17 PM, 2017    /70  Ht 1.6 m (5' 3\")  Wt 82.8 kg (182 lb 8 oz)  LMP 2016  BMI 32.33 kg/m2  See OB flowsheet    1) concerns:   2) Sciatica pain; referral to PT  3) Return to clinic in 4 weeks  4) GTT at next visit     The information in this document, created by the medical scribe for me, accurately reflects the services I personally performed and the decisions made by me. I have reviewed and approved this document for accuracy prior to leaving the patient care area.  Vianca Almeida DO  3:18 PM, 17    [place]  "

## 2017-06-14 NOTE — PATIENT INSTRUCTIONS
Return in 4 weeks for glucose test     Physical therapy you will call    Dr. Vianca Almeida,     Obstetrics and Gynecology  [facility] [place]

## 2017-06-14 NOTE — MR AVS SNAPSHOT
After Visit Summary   6/14/2017    Joann Patterson    MRN: [id number]           Patient Information     Date Of Birth          1985        Visit Information        Provider Department      6/14/2017 3:00 PM Vianca Almeida DO [facility]        Today's Diagnoses     Encounter for supervision of normal first pregnancy in second trimester    -  1    Acute bilateral low back pain with right-sided sciatica          Care Instructions    Return in 4 weeks for glucose test     Physical therapy will call you to set up appointment       Dr. Vianca Almeida,     Obstetrics and Gynecology  [facility] [place]                 Follow-ups after your visit        Additional Services     [facility] PT, HAND, AND CHIROPRACTIC REFERRAL       **This order will print in the [facility] Scheduling Office**    Physical Therapy, Hand Therapy and Chiropractic Care are available through:    *[facility]  *[facility]  *[facility]    Call one number to schedule at any of the above locations: [phone].    Your provider has referred you to: Physical Therapy at [facility] or [facility]    Indication/Reason for Referral: Women's Health (Please Complete Special Programs SmartList)  Onset of Illness: few weeks  Therapy Orders: Evaluate and Treat  Special Programs: Acupuncture, None and Women's Health: OB/GYN Musculoskeletal Dysfunction: LBP/Sacral Pain and Pregnancy: 24 weeks Weeks Gestation and  Biofeedback, Joint Mobilization, Myofascial Release/Massage, Strengthening and Stretching  Special Request: Equipment: As Indicated  Exercise: Active/Assistive ROM, Conditioning, Home Exercise Program, Passive ROM and Progressive Strengthening  Manual Therapy: Joint Mobilization and Myofascial Release/Massage  Modalities: As Indicated:   None    Theresa Cardenas      Additional Comments for the Therapist or Chiropractor:     Please be aware that coverage of these  services is subject to the terms and limitations of your health insurance plan.  Call member services at your health plan with any benefit or coverage questions.      Please bring the following to your appointment:    *Your personal calendar for scheduling future appointments  *Comfortable clothing                  Follow-up notes from your care team     Return in about 4 weeks (around 7/12/2017).      Your next 10 appointments already scheduled     Jul 12, 2017  3:00 PM CDT   ESTABLISHED PRENATAL with Vianca Almeida, DO   [facility]    [address]  [facility] [address] [phone]              Who to contact     If you have questions or need follow up information about today's clinic visit or your schedule please contact [facility] directly at [phone].  Normal or non-critical lab and imaging results will be communicated to you by MyChart, letter or phone within 4 business days after the clinic has received the results. If you do not hear from us within 7 days, please contact the clinic through MyChart or phone. If you have a critical or abnormal lab result, we will notify you by phone as soon as possible.  Submit refill requests through [employer] or call your pharmacy and they will forward the refill request to us. Please allow 3 business days for your refill to be completed.          Additional Information About Your Visit        [employer] Information     [employer] gives you secure access to your electronic health record. If you see a primary care provider, you can also send messages to your care team and make appointments. If you have questions, please call your primary care clinic.  If you do not have a primary care provider, please call [phone] and they will assist you.        Care EveryWhere ID     This is your Care EveryWhere ID. This could be used by other organizations to access your [facility]  "records  [id number]        Your Vitals Were     Height Last Period BMI (Body Mass Index)             5' 3\" (1.6 m) 12/14/2016 32.33 kg/m2          Blood Pressure from Last 3 Encounters:   06/14/17 100/70   05/17/17 110/68   04/12/17 100/60    Weight from Last 3 Encounters:   06/14/17 182 lb 8 oz (82.8 kg)   05/17/17 180 lb 6.4 oz (81.8 kg)   04/12/17 180 lb 8 oz (81.9 kg)              We Performed the Following     [facility] PT, HAND, AND CHIROPRACTIC REFERRAL        Primary Care Provider Office Phone # Fax #    Vianca Jacobo [place],  [phone]       [facility] [address] [facility] [address]        Thank you!     Thank you for choosing [facility]  for your care. Our goal is always to provide you with excellent care. Hearing back from our patients is one way we can continue to improve our services. Please take a few minutes to complete the written survey that you may receive in the mail after your visit with us. Thank you!             Your Updated Medication List - Protect others around you: Learn how to safely use, store and throw away your medicines at www.disposemymeds.org.          This list is accurate as of: 6/14/17  3:20 PM.  Always use your most recent med list.                   Brand Name Dispense Instructions for use    docusate sodium 100 MG tablet    COLACE    60 tablet    Take 100 mg by mouth daily       fluconazole 150 MG tablet    DIFLUCAN    4 tablet    Take 1 tablet (150 mg) by mouth every 3 days       ondansetron 8 MG tablet    ZOFRAN    21 tablet    Take 1 tablet (8 mg) by mouth every 8 hours as needed for nausea       prochlorperazine 10 MG tablet    COMPAZINE    40 tablet    Take 1 tablet (10 mg) by mouth every 6 hours as needed for nausea or vomiting         "

## 2017-06-27 ENCOUNTER — THERAPY VISIT (OUTPATIENT)
Dept: PHYSICAL THERAPY | Facility: CLINIC | Age: 32
End: 2017-06-27
Payer: COMMERCIAL

## 2017-06-27 DIAGNOSIS — M54.41 ACUTE LOW BACK PAIN WITH RIGHT-SIDED SCIATICA: Primary | ICD-10-CM

## 2017-06-27 DIAGNOSIS — M54.50 PREGNANCY WITH LOW BACK PAIN, ANTEPARTUM: ICD-10-CM

## 2017-06-27 DIAGNOSIS — O26.899 PREGNANCY WITH LOW BACK PAIN, ANTEPARTUM: ICD-10-CM

## 2017-06-27 PROCEDURE — 97110 THERAPEUTIC EXERCISES: CPT | Mod: GP | Performed by: PHYSICAL THERAPIST

## 2017-06-27 PROCEDURE — 97530 THERAPEUTIC ACTIVITIES: CPT | Mod: GP | Performed by: PHYSICAL THERAPIST

## 2017-06-27 PROCEDURE — 97161 PT EVAL LOW COMPLEX 20 MIN: CPT | Mod: GP | Performed by: PHYSICAL THERAPIST

## 2017-06-27 NOTE — PROGRESS NOTES
[facility]  System  Physical Exam  General   ROS          Physical Therapy Initial Examination/Evaluation June 27, 2017   Joann Patterson is a 32 year old female referred to physical therapy by Dr. Vianca Jessica for treatment of Acute bilat low back pain with R-sided sciatica with Precautions/Restrictions/MD instructions E&T   Therapist Assessment:   Clinical Impression: Pt presents to [facility] [place] with primary complaint of R hip pain as well as new muscular cramping in R calf.  Per clinical examination, pt with decreased core and proximal muscle strength.   Pt did have some improvement in symptoms with trial of SI belt.  Pt will benefit from skilled physical therapy for stretching and strengthening program as well as education on proper body mechanics during pregnancy.    Subjective: Pt reporting R leg pain which started a couple of weeks ago. Yesterday on 6/26/2017 at 3:30 am she got a muscle cramp in R calf that has not gone away.   DOI/onset: 6/6/2017   Mechanism of injury: none   DOS NA   Related PMH: Current pregnancy, endometriosis (surgery every 3-4 years) Previous treatment: massage therapist   Imaging: NA   Chief Complaint: R sided hip pain and R calf cramping   Pain: rest 3 /10, activity 7/10  Described as: Shooting, sharp, piercing Alleviated by: Sitting, resting Exacerbated by: Gardening  Progression of symptoms since initial onset: staying the same Time of day when pain is worse: no particular time   Sleeping: sometimes is able to sleep on back; usually sleeps on L side but does vary   Social history: 26 weeks along with 1st child (son), due Octover 2, 2017; lives alone; Significant other and 5 year old daughter and 4 year old son are moving in  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[employer]; 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, computer work    Current HEP/exercise regimen: No exercise currenlty; very minimal activity prior to pregnancy  Patient's goals are decrease pain    Other pertinent  PMH: Asthma, former smoker General health as reported by patient: Good   Return to MD: 07/12/2017     Lumbar Spine Evaluation  Static Posture    Knee: Varus/Valgus: WNL                 Hip: WNL    Lumbar Spine:  Increased lordosis    Dynamic Movement Screen    Gait:Decreased stance time on the R; Trendelenburg gait pattern       Trunk Range of Motion  Flexion: 50% ROM with tightness noted, especially on the R side  Extension: 50% ROM   Side bending: WNL  Rotation: WNL  Hamstring: WNL  Quadriceps/Hip Flexor: NT    SI Provocation testing:   OBINNA: + on the R  Thigh thrust: Neg bilat  Approximation: + on the R  Compression: - bilat  Sacral Shear: -    Hip and Knee Strength   MMT Hip Abduction Hip Extension Hip Flexion   Left 4/5 4-/5 4-/5   Right 4/5 4-/5 4-/5     Special Tests  Neural tension: Negative slump and SLR   Dermatomes: WNL      Assessment/Plan:      Patient is a 32 year old female with lumbar and sacral complaints.    Patient has the following significant findings with corresponding treatment plan.                Diagnosis 1:  Acute bilateral low back pain with R-sided sciatica  Pain -  hot/cold therapy, manual therapy, self management, education and home program  Decreased strength - therapeutic exercise, therapeutic activities and home program  Impaired muscle performance - neuro re-education and home program  Decreased function - therapeutic activities and home program  Impaired posture - neuro re-education, therapeutic activities and home program    Therapy Evaluation Codes:   1) History comprised of:   Personal factors that impact the plan of care:      Age, Past/current experiences and Time since onset of symptoms.    Comorbidity factors that impact the plan of care are:      Asthma, Current pregnancy and Weakness.     Medications impacting care: None.  2) Examination of Body Systems comprised of:   Body structures and functions that impact the plan of care:      Lumbar spine and Sacral illiac  joint.   Activity limitations that impact the plan of care are:      Squatting/kneeling, Walking and Sleeping.  3) Clinical presentation characteristics are:   Stable/Uncomplicated.  4) Decision-Making    Low complexity using standardized patient assessment instrument and/or measureable assessment of functional outcome.  Cumulative Therapy Evaluation is: Low complexity.    Previous and current functional limitations:  (See Goal Flow Sheet for this information)    Short term and Long term goals: (See Goal Flow Sheet for this information)     Communication ability:  Patient appears to be able to clearly communicate and understand verbal and written communication and follow directions correctly.  Treatment Explanation - The following has been discussed with the patient:   RX ordered/plan of care  Anticipated outcomes  Possible risks and side effects  This patient would benefit from PT intervention to resume normal activities.   Rehab potential is good.    Frequency:  1 X week, once daily  Duration:  for 8 weeks  Discharge Plan:  Achieve all LTG.  Independent in home treatment program.  Reach maximal therapeutic benefit.    Please refer to the daily flowsheet for treatment today, total treatment time and time spent performing 1:1 timed codes.

## 2017-06-27 NOTE — MR AVS SNAPSHOT
After Visit Summary   6/27/2017    Joann Patterson    MRN: [id number]           Patient Information     Date Of Birth          1985        Visit Information        Provider Department      6/27/2017 2:10 PM Helena Weir, PT [facility] [place]        Today's Diagnoses     Acute low back pain with right-sided sciatica    -  1    Pregnancy with low back pain, antepartum           Follow-ups after your visit        Your next 10 appointments already scheduled     Jul 05, 2017  3:30 PM CDT   PEDRO For Women Only with Helena Weir, PT   [facility] [place] ( [facility])    [address] [place] [address] [phone]            Jul 12, 2017  3:00 PM CDT   ESTABLISHED PRENATAL with Vianca Almeida,    [facility] ([facility])    [address]  [facility] [address]   [phone]            Jul 13, 2017  2:30 PM CDT   PEDRO For Women Only with Mariya Denny, PT   [facility] [place] ( [facility])    [address] [place] [address] [phone]            Jul 25, 2017  2:30 PM CDT   PEDRO For Women Only with Mariya Denny, PT   [facility] [place] ( [facility])    [address] [place] [id number] [phone]              Who to contact     If you have questions or need follow up information about today's clinic visit or your schedule please contact [facility] directly at [phone].  Normal or non-critical lab and imaging results will be communicated to you by MyChart, letter or phone within 4 business days after the clinic has received the results. If you do not hear from us within 7 days, please contact the clinic through MyChart or phone. If you have a critical or abnormal lab  result, we will notify you by phone as soon as possible.  Submit refill requests through [employer] or call your pharmacy and they will forward the refill request to us. Please allow 3 business days for your refill to be completed.          Additional Information About Your Visit        [employer] Information     [employer] gives you secure access to your electronic health record. If you see a primary care provider, you can also send messages to your care team and make appointments. If you have questions, please call your primary care clinic.  If you do not have a primary care provider, please call [phone] and they will assist you.        Care EveryWhere ID     This is your Care EveryWhere ID. This could be used by other organizations to access your [place] medical records  [id number]        Your Vitals Were     Last Period                   12/14/2016            Blood Pressure from Last 3 Encounters:   06/14/17 100/70   05/17/17 110/68   04/12/17 100/60    Weight from Last 3 Encounters:   06/14/17 82.8 kg (182 lb 8 oz)   05/17/17 81.8 kg (180 lb 6.4 oz)   04/12/17 81.9 kg (180 lb 8 oz)              We Performed the Following     HC PT EVAL, LOW COMPLEXITY     PEDRO INITIAL EVAL REPORT     THERAPEUTIC ACTIVITIES     THERAPEUTIC EXERCISES        Primary Care Provider Office Phone # Fax #    Vianca Jacobo DO Juan Pablo [phone]       [facility] [address] [place] [address]        Equal Access to Services     CASSANDRA GILBERT : Hadii aad ku hadasho Soomaali, waaxda luqadaha, qaybta kaalmada adeegyada, orly deleon hayhaylien yanci ennis . So [facility] [phone].    ATENCIÓN: Si habla español, tiene a lee disposición servicios gratuitos de asistencia lingüística. Llame al [phone].    We comply with applicable federal civil rights laws and [place] laws. We do not discriminate on the basis of race, color, national origin, age, disability sex, sexual orientation or gender  identity.            Thank you!     Thank you for choosing [facility] [place]  for your care. Our goal is always to provide you with excellent care. Hearing back from our patients is one way we can continue to improve our services. Please take a few minutes to complete the written survey that you may receive in the mail after your visit with us. Thank you!             Your Updated Medication List - Protect others around you: Learn how to safely use, store and throw away your medicines at www.disposemymeds.org.          This list is accurate as of: 6/27/17  6:20 PM.  Always use your most recent med list.                   Brand Name Dispense Instructions for use Diagnosis    docusate sodium 100 MG tablet    COLACE    60 tablet    Take 100 mg by mouth daily    S/P laparoscopy       fluconazole 150 MG tablet    DIFLUCAN    4 tablet    Take 1 tablet (150 mg) by mouth every 3 days    Candidiasis of vulva and vagina       ondansetron 8 MG tablet    ZOFRAN    21 tablet    Take 1 tablet (8 mg) by mouth every 8 hours as needed for nausea    Nausea       prochlorperazine 10 MG tablet    COMPAZINE    40 tablet    Take 1 tablet (10 mg) by mouth every 6 hours as needed for nausea or vomiting    Supervision of normal first pregnancy, antepartum, Nausea

## 2017-06-28 ENCOUNTER — TELEPHONE (OUTPATIENT)
Dept: OBGYN | Facility: CLINIC | Age: 32
End: 2017-06-28

## 2017-06-28 DIAGNOSIS — R10.2 PELVIC PAIN IN FEMALE: Primary | ICD-10-CM

## 2017-06-28 NOTE — TELEPHONE ENCOUNTER
----- Message from Helena Weir, PT sent at 6/27/2017  6:20 PM CDT -----  Regarding: SI belt order  Hi Dr. Almeida,     I saw your patient Joann for her initial PT evaluation today. We did try an SI belt and she did have some relief of symptoms. I had her take one home to try. I was wondering if you could please submit a DME order for a Maternity SI-Loc belt so that I can officially issue her one if she continues to have improved symptoms. Thanks!      Helena Weir, PT, DPT

## 2017-06-28 NOTE — TELEPHONE ENCOUNTER
Sure, do I just put the order in the computer?    Dr. Vianca Almeida, DO    Obstetrics and Gynecology  [facility] [place]

## 2017-07-05 ENCOUNTER — THERAPY VISIT (OUTPATIENT)
Dept: PHYSICAL THERAPY | Facility: CLINIC | Age: 32
End: 2017-07-05
Payer: COMMERCIAL

## 2017-07-05 DIAGNOSIS — M54.50 PREGNANCY WITH LOW BACK PAIN, ANTEPARTUM: ICD-10-CM

## 2017-07-05 DIAGNOSIS — M54.41 ACUTE RIGHT-SIDED LOW BACK PAIN WITH RIGHT-SIDED SCIATICA: Primary | ICD-10-CM

## 2017-07-05 DIAGNOSIS — O26.899 PREGNANCY WITH LOW BACK PAIN, ANTEPARTUM: ICD-10-CM

## 2017-07-05 PROCEDURE — 97112 NEUROMUSCULAR REEDUCATION: CPT | Mod: GP | Performed by: PHYSICAL THERAPIST

## 2017-07-05 PROCEDURE — 97110 THERAPEUTIC EXERCISES: CPT | Mod: GP | Performed by: PHYSICAL THERAPIST

## 2017-07-12 ENCOUNTER — PRENATAL OFFICE VISIT (OUTPATIENT)
Dept: OBGYN | Facility: CLINIC | Age: 32
End: 2017-07-12
Payer: COMMERCIAL

## 2017-07-12 VITALS
SYSTOLIC BLOOD PRESSURE: 110 MMHG | HEIGHT: 63 IN | WEIGHT: 180.5 LBS | DIASTOLIC BLOOD PRESSURE: 66 MMHG | BODY MASS INDEX: 31.98 KG/M2

## 2017-07-12 DIAGNOSIS — Z34.02 ENCOUNTER FOR SUPERVISION OF NORMAL FIRST PREGNANCY IN SECOND TRIMESTER: Primary | ICD-10-CM

## 2017-07-12 DIAGNOSIS — Z34.03 ENCOUNTER FOR SUPERVISION OF NORMAL FIRST PREGNANCY IN THIRD TRIMESTER: ICD-10-CM

## 2017-07-12 LAB
ERYTHROCYTE [DISTWIDTH] IN BLOOD BY AUTOMATED COUNT: 13.9 % (ref 10–15)
HCT VFR BLD AUTO: 36.1 % (ref 35–47)
HGB BLD-MCNC: 12 G/DL (ref 11.7–15.7)
MCH RBC QN AUTO: 31.9 PG (ref 26.5–33)
MCHC RBC AUTO-ENTMCNC: 33.2 G/DL (ref 31.5–36.5)
MCV RBC AUTO: 96 FL (ref 78–100)
PLATELET # BLD AUTO: 341 10E9/L (ref 150–450)
RBC # BLD AUTO: 3.76 10E12/L (ref 3.8–5.2)
WBC # BLD AUTO: 13 10E9/L (ref 4–11)

## 2017-07-12 PROCEDURE — 90471 IMMUNIZATION ADMIN: CPT | Performed by: FAMILY MEDICINE

## 2017-07-12 PROCEDURE — 86780 TREPONEMA PALLIDUM: CPT | Performed by: FAMILY MEDICINE

## 2017-07-12 PROCEDURE — 36415 COLL VENOUS BLD VENIPUNCTURE: CPT | Performed by: FAMILY MEDICINE

## 2017-07-12 PROCEDURE — 82950 GLUCOSE TEST: CPT | Performed by: FAMILY MEDICINE

## 2017-07-12 PROCEDURE — 99207 ZZC PRENATAL VISIT: CPT | Performed by: FAMILY MEDICINE

## 2017-07-12 PROCEDURE — 85027 COMPLETE CBC AUTOMATED: CPT | Performed by: FAMILY MEDICINE

## 2017-07-12 PROCEDURE — 90715 TDAP VACCINE 7 YRS/> IM: CPT | Performed by: FAMILY MEDICINE

## 2017-07-12 NOTE — PROGRESS NOTES
"CC: Here for routine prenatal visit   32 year old y/o  @ 28w2d with Estimated Date of Delivery: Oct 2, 2017   HPI: Reports heartburn and back pain. Takes Tums, states it works for now. Doing PT, going well. Uses belly band.  /66  Ht 1.6 m (5' 3\")  Wt 81.9 kg (180 lb 8 oz)  LMP 2016  BMI 31.97 kg/m2  See OB flowsheet    This document serves as a record of the services and decisions personally performed and made by Vianca Almeida DO. It was created on her behalf by June Angulo, a trained medical scribe. The creation of this document is based the provider's statements to the medical scribe.  June Angulo 2017 3:20 PM        1) concerns: none.  2) GCT today  3) Return to clinic in 2 weeks      The information in this document, created by the medical scribe for me, accurately reflects the services I personally performed and the decisions made by me. I have reviewed and approved this document for accuracy prior to leaving the patient care area.  2017 3:20 PM    Vianca Almeida DO  "

## 2017-07-12 NOTE — PATIENT INSTRUCTIONS
Return every 2 weeks until 36 weeks, then weekly       Dr. Vianca Almeida,     Obstetrics and Gynecology  [facility] [place]

## 2017-07-12 NOTE — MR AVS SNAPSHOT
After Visit Summary   7/12/2017    Joann Patterson    MRN: [id number]           Patient Information     Date Of Birth          1985        Visit Information        Provider Department      7/12/2017 3:00 PM Vianca Almeida,  [facility]        Today's Diagnoses     Encounter for supervision of normal first pregnancy in second trimester    -  1    Encounter for supervision of normal first pregnancy in third trimester          Care Instructions    Return every 2 weeks until 36 weeks, then weekly       Dr. Vianca Almeida,     Obstetrics and Gynecology  [facility] [place]                 Follow-ups after your visit        Your next 10 appointments already scheduled     Jul 25, 2017  2:30 PM CDT   PEDRO For Women Only with Mariya Denny [facility] ([facility])    [address] [place] [address] [phone]              Who to contact     If you have questions or need follow up information about today's clinic visit or your schedule please contact [facility] directly at [phone].  Normal or non-critical lab and imaging results will be communicated to you by [email or web address], letter or phone within 4 business days after the clinic has received the results. If you do not hear from us within 7 days, please contact the clinic through [email or web address] or phone. If you have a critical or abnormal lab result, we will notify you by phone as soon as possible.  Submit refill requests through [employer] or call your pharmacy and they will forward the refill request to us. Please allow 3 business days for your refill to be completed.          Additional Information About Your Visit        [email or web address] Information     [employer] gives you secure access to your electronic health record. If you see a primary care provider, you can also send messages to your care team and make appointments. If  "you have questions, please call your primary care clinic.  If you do not have a primary care provider, please call [phone] and they will assist you.        Care EveryWhere ID     This is your Care EveryWhere ID. This could be used by other organizations to access your [place] medical records  [id number]        Your Vitals Were     Height Last Period BMI (Body Mass Index)             5' 3\" (1.6 m) 12/14/2016 31.97 kg/m2          Blood Pressure from Last 3 Encounters:   07/12/17 110/66   06/14/17 100/70   05/17/17 110/68    Weight from Last 3 Encounters:   07/12/17 180 lb 8 oz (81.9 kg)   06/14/17 182 lb 8 oz (82.8 kg)   05/17/17 180 lb 6.4 oz (81.8 kg)              We Performed the Following     Anti Treponema     CBC with platelets     Glucose tolerance, gest screen, 1 hour     TDAP VACCINE (ADACEL)        Primary Care Provider Office Phone # Fax #    Vianca Jacobo Juan Pablo,  [phone]       [facility] [address] [place] [id number]        Equal Access to Services     CASSANDRA GILBERT AH: Hadii aad ku hadasho Soomaali, waaxda luqadaha, qaybta kaalmada adeegyada, waxay anain hayhaylien yanci ennis . So [facility] [phone].    ATENCIÓN: Si habla español, tiene a lee disposición servicios gratuitos de asistencia lingüística. Llame al [phone].    We comply with applicable federal civil rights laws and [place] laws. We do not discriminate on the basis of race, color, national origin, age, disability sex, sexual orientation or gender identity.            Thank you!     Thank you for choosing [facility]  for your care. Our goal is always to provide you with excellent care. Hearing back from our patients is one way we can continue to improve our services. Please take a few minutes to complete the written survey that you may receive in the mail after your visit with us. Thank you!             Your Updated Medication List - Protect others around you: Learn how " to safely use, store and throw away your medicines at www.disposemymeds.org.          This list is accurate as of: 7/12/17  3:18 PM.  Always use your most recent med list.                   Brand Name Dispense Instructions for use Diagnosis    docusate sodium 100 MG tablet    COLACE    60 tablet    Take 100 mg by mouth daily    S/P laparoscopy       fluconazole 150 MG tablet    DIFLUCAN    4 tablet    Take 1 tablet (150 mg) by mouth every 3 days    Candidiasis of vulva and vagina       ondansetron 8 MG tablet    ZOFRAN    21 tablet    Take 1 tablet (8 mg) by mouth every 8 hours as needed for nausea    Nausea       prochlorperazine 10 MG tablet    COMPAZINE    40 tablet    Take 1 tablet (10 mg) by mouth every 6 hours as needed for nausea or vomiting    Supervision of normal first pregnancy, antepartum, Nausea

## 2017-07-12 NOTE — NURSING NOTE
"28w2d    Chief Complaint   Patient presents with     Prenatal Care     glucose today       Initial /66  Ht 5' 3\" (1.6 m)  Wt 180 lb 8 oz (81.9 kg)  LMP 12/14/2016  BMI 31.97 kg/m2 Estimated body mass index is 31.97 kg/(m^2) as calculated from the following:    Height as of this encounter: 5' 3\" (1.6 m).    Weight as of this encounter: 180 lb 8 oz (81.9 kg).  Medication Reconciliation: complete   Lulu Gonzalez CMA      "

## 2017-07-13 LAB
GLUCOSE 1H P 50 G GLC PO SERPL-MCNC: 109 MG/DL (ref 60–129)
T PALLIDUM IGG+IGM SER QL: NEGATIVE

## 2017-07-25 ENCOUNTER — THERAPY VISIT (OUTPATIENT)
Dept: PHYSICAL THERAPY | Facility: CLINIC | Age: 32
End: 2017-07-25
Payer: COMMERCIAL

## 2017-07-25 DIAGNOSIS — O26.899 PREGNANCY WITH LOW BACK PAIN, ANTEPARTUM: ICD-10-CM

## 2017-07-25 DIAGNOSIS — M54.50 PREGNANCY WITH LOW BACK PAIN, ANTEPARTUM: ICD-10-CM

## 2017-07-25 PROCEDURE — 97112 NEUROMUSCULAR REEDUCATION: CPT | Mod: GP | Performed by: PHYSICAL THERAPIST

## 2017-07-25 PROCEDURE — 97140 MANUAL THERAPY 1/> REGIONS: CPT | Mod: GP | Performed by: PHYSICAL THERAPIST

## 2017-07-25 PROCEDURE — 97110 THERAPEUTIC EXERCISES: CPT | Mod: GP | Performed by: PHYSICAL THERAPIST

## 2017-07-27 ENCOUNTER — PRENATAL OFFICE VISIT (OUTPATIENT)
Dept: OBGYN | Facility: CLINIC | Age: 32
End: 2017-07-27
Payer: COMMERCIAL

## 2017-07-27 VITALS
OXYGEN SATURATION: 98 % | BODY MASS INDEX: 32.42 KG/M2 | WEIGHT: 183 LBS | SYSTOLIC BLOOD PRESSURE: 108 MMHG | HEART RATE: 90 BPM | DIASTOLIC BLOOD PRESSURE: 70 MMHG

## 2017-07-27 DIAGNOSIS — Z34.03 ENCOUNTER FOR SUPERVISION OF NORMAL FIRST PREGNANCY IN THIRD TRIMESTER: Primary | ICD-10-CM

## 2017-07-27 PROCEDURE — 99207 ZZC PRENATAL VISIT: CPT | Performed by: OBSTETRICS & GYNECOLOGY

## 2017-07-27 NOTE — MR AVS SNAPSHOT
After Visit Summary   7/27/2017    Joann Patterson    MRN: [id number]           Patient Information     Date Of Birth          1985        Visit Information        Provider Department      7/27/2017 4:15 PM Dimas Murphy MD [facility]        Today's Diagnoses     Encounter for supervision of normal first pregnancy in third trimester    -  1      Care Instructions    You can reach your [place] Care Team any time of the day by calling [phone]. This number will put you in touch with the 24 hour nurse line if the clinic is closed.    To contact your OB/GYN Surgery Scheduler please call [phone]. This is a direct number for your care team between 8 a.m. and 4 p.m. Monday through Friday.    [facility] is open for your convenience: [phone]  Monday through Friday 8 a.m. to 8:30 p.m.  Saturday 9 a.m. to 6 p.m.  Sunday Noon to 6 p.m.    They are closed on all major holidays.              Follow-ups after your visit        Follow-up notes from your care team     Return in about 1 month (around 8/27/2017) for prenatal Visit.      Your next 10 appointments already scheduled     Aug 08, 2017  2:30 PM CDT   ESTABLISHED PRENATAL with Vianca Almeida DO   [facility]    [address]  [facility] [address] [phone]            Aug 10, 2017  2:30 PM CDT   PEDRO For Women Only with Mariya Denny [facility] ([facility])    [address] [place], [place] [id number]  [facility] [address]   [phone]            Aug 23, 2017  2:45 PM CDT   ESTABLISHED PRENATAL with Vianca Almeida DO   [facility]    [address]  [facility] [id number] [phone]            Sep 06, 2017  2:45 PM CDT   ESTABLISHED PRENATAL with Vianca Almeida DO   [facility] ([facility])    [address]  [facility] [address] [phone]              Who to contact     If you have questions or need follow up information about today's clinic visit or your schedule please contact [facility] directly at [phone].  Normal or non-critical lab and imaging results will be communicated to you by MyChart, letter or phone within 4 business days after the clinic has received the results. If you do not hear from us within 7 days, please contact the clinic through MyChart or phone. If you have a critical or abnormal lab result, we will notify you by phone as soon as possible.  Submit refill requests through [employer] or call your pharmacy and they will forward the refill request to us. Please allow 3 business days for your refill to be completed.          Additional Information About Your Visit        MyChart Information     [employer] gives you secure access to your electronic health record. If you see a primary care provider, you can also send messages to your care team and make appointments. If you have questions, please call your primary care clinic.  If you do not have a primary care provider, please call [phone] and they will assist you.        Care EveryWhere ID     This is your Care EveryWhere ID. This could be used by other organizations to access your [place] medical records  [id number]        Your Vitals Were     Pulse Last Period Pulse Oximetry BMI (Body Mass Index)          90 12/14/2016 98% 32.42 kg/m2         Blood Pressure from Last 3 Encounters:   07/27/17 108/70   07/12/17 110/66   06/14/17 100/70    Weight from Last 3 Encounters:   07/27/17 183 lb (83 kg)   07/12/17 180 lb 8 oz (81.9 kg)   06/14/17 182 lb 8 oz (82.8 kg)              Today, you had the following     No orders found for display       Primary Care Provider Office Phone # Fax #    Vianca Almeida [phone]       [facility] [address] [facility] [address]        Equal Access to Services     NANICOLE VLADIMIR : Hadii jonathan rojas nessamarkus Avelinazulma, watravonda karybcha, qawuta karobertadrianne petesouthadrianne, orly valenzuelaclerika infante. So [facility] [phone].    ATENCIÓN: Si habla español, tiene a lee disposición servicios gratuitos de asistencia lingüística. Llame al [phone].    We comply with applicable federal civil rights laws and [place] laws. We do not discriminate on the basis of race, color, national origin, age, disability sex, sexual orientation or gender identity.            Thank you!     Thank you for choosing [facility]  for your care. Our goal is always to provide you with excellent care. Hearing back from our patients is one way we can continue to improve our services. Please take a few minutes to complete the written survey that you may receive in the mail after your visit with us. Thank you!             Your Updated Medication List - Protect others around you: Learn how to safely use, store and throw away your medicines at www.disposemymeds.org.          This list is accurate as of: 7/27/17 11:59 PM.  Always use your most recent med list.                   Brand Name Dispense Instructions for use Diagnosis    docusate sodium 100 MG tablet    COLACE    60 tablet    Take 100 mg by mouth daily    S/P laparoscopy       fluconazole 150 MG tablet    DIFLUCAN    4 tablet    Take 1 tablet (150 mg) by mouth every 3 days    Candidiasis of vulva and vagina       ondansetron 8 MG tablet    ZOFRAN    21 tablet    Take 1 tablet (8 mg) by mouth every 8 hours as needed for nausea    Nausea       prochlorperazine 10 MG tablet    COMPAZINE    40 tablet    Take 1 tablet (10 mg) by mouth every 6 hours as needed for nausea or vomiting    Supervision of normal first pregnancy, antepartum, Nausea

## 2017-07-27 NOTE — NURSING NOTE
"Chief Complaint   Patient presents with     Prenatal Care       Initial /70  Pulse 90  Wt 183 lb (83 kg)  LMP 2016  SpO2 98%  BMI 32.42 kg/m2 Estimated body mass index is 32.42 kg/(m^2) as calculated from the following:    Height as of 17: 5' 3\" (1.6 m).    Weight as of this encounter: 183 lb (83 kg).  BP completed using cuff size: regular        The following HM Due: NONE      The following patient reported/Care Every where data was sent to:  P ABSTRACT QUALITY INITIATIVES [[id number]]       30w3d  States feeling good, No concerns.  Having good fetal movements.      Alayna Lomas, [facility]                "

## 2017-07-27 NOTE — PROGRESS NOTES
Joann Patterson is a 32 year old female  Estimated Date of Delivery: Oct 2, 2017 at 30w3d who presents for a prenatal visit. Pt is doing well. Denies leakage of fluids and vaginal bleeding.  AGA doing well, good FM no concerns     This document serves as a record of the services and decisions personally performed and made by Dimas Murphy M.D. It was created on his behalf by She Albright, a trained medical scribe. The creation of this document is based the provider's statements to the medical scribe.  She Albright 2017 4:35 PM     See OB Flowsheet    A: Pt is doing well. GBS normal.  P: Follow up in 2 weeks. labor symptoms and symptoms of concern discussed, patient to call     The information in this document, created by the medical scribe for me, accurately reflects the services I personally performed and the decisions made by me. I have reviewed and approved this document for accuracy prior to leaving the patient care area.  2017 4:35 PM        Dimas Murphy M.D.

## 2017-07-30 NOTE — PATIENT INSTRUCTIONS
You can reach your [place] Care Team any time of the day by calling [phone]. This number will put you in touch with the 24 hour nurse line if the clinic is closed.    To contact your OB/GYN Surgery Scheduler please call [phone]. This is a direct number for your care team between 8 a.m. and 4 p.m. Monday through Friday.    [facility] is open for your convenience: [phone]  Monday through Friday 8 a.m. to 8:30 p.m.  Saturday 9 a.m. to 6 p.m.  Sunday Noon to 6 p.m.    They are closed on all major holidays.

## 2017-08-08 ENCOUNTER — PRENATAL OFFICE VISIT (OUTPATIENT)
Dept: OBGYN | Facility: CLINIC | Age: 32
End: 2017-08-08
Payer: COMMERCIAL

## 2017-08-08 VITALS
BODY MASS INDEX: 32.48 KG/M2 | HEIGHT: 63 IN | DIASTOLIC BLOOD PRESSURE: 66 MMHG | SYSTOLIC BLOOD PRESSURE: 104 MMHG | WEIGHT: 183.3 LBS

## 2017-08-08 DIAGNOSIS — Z34.93 PRENATAL CARE IN THIRD TRIMESTER: Primary | ICD-10-CM

## 2017-08-08 DIAGNOSIS — O26.843 UTERINE SIZE DATE DISCREPANCY, THIRD TRIMESTER: ICD-10-CM

## 2017-08-08 PROCEDURE — 99207 ZZC PRENATAL VISIT: CPT | Performed by: FAMILY MEDICINE

## 2017-08-08 RX ORDER — BREAST PUMP
1 EACH MISCELLANEOUS DAILY
Qty: 1 EACH | Refills: 3 | Status: SHIPPED | OUTPATIENT
Start: 2017-08-08 | End: 2020-07-08

## 2017-08-08 NOTE — PROGRESS NOTES
"CC: Here for routine prenatal visit   32 year old y/o  @ 32w1d with Estimated Date of Delivery: Oct 2, 2017   HPI: Patient reports her back is sore and uncomfortable all the time. + fetal movement     This document serves as a record of the services and decisions personally performed and made by Vianca Almeida DO. It was created on his/her behalf by Kesha Zhong, a trained medical scribe. The creation of this document is based the provider's statements to the medical scribe.  Myraibgaudencio Zhong 2:44 PM, 2017    /66  Ht 1.6 m (5' 3\")  Wt 83.1 kg (183 lb 4.8 oz)  LMP 2016  BMI 32.47 kg/m2  See OB flowsheet    1) concerns: none   3) Ordered growth US   2) Return to clinic in 2 weeks     The information in this document, created by the medical scribe for me, accurately reflects the services I personally performed and the decisions made by me. I have reviewed and approved this document for accuracy prior to leaving the patient care area.  Vianca Almeida DO  2:44 PM, 17    [place]    "

## 2017-08-08 NOTE — NURSING NOTE
"32w1d    Chief Complaint   Patient presents with     Prenatal Care     would like written Rx for breast pump       Initial /66  Ht 5' 3\" (1.6 m)  Wt 183 lb 4.8 oz (83.1 kg)  LMP 12/14/2016  BMI 32.47 kg/m2 Estimated body mass index is 32.47 kg/(m^2) as calculated from the following:    Height as of this encounter: 5' 3\" (1.6 m).    Weight as of this encounter: 183 lb 4.8 oz (83.1 kg).  Medication Reconciliation: complete   Lulu Gonzalez CMA      "

## 2017-08-08 NOTE — MR AVS SNAPSHOT
After Visit Summary   8/8/2017    Joann Patterson    MRN: [id number]           Patient Information     Date Of Birth          1985        Visit Information        Provider Department      8/8/2017 2:30 PM Vianca Almeida DO [facility]        Today's Diagnoses     Prenatal care in third trimester    -  1    Postpartum care and examination of lactating mother          Care Instructions    Return in 2 weeks     Return to clinic:  every 4 weeks till 32 weeks, then every 2 weeks till 36 weeks, then weekly till delivery      Dr. Vianca Almeida,     Obstetrics and Gynecology  [facility] and [place]                 Follow-ups after your visit        Your next 10 appointments already scheduled     Aug 10, 2017  2:30 PM CDT   PEDRO For Women Only with Mariya Denny [facility] ([facility])    [address] [place] [address] [phone]            Aug 23, 2017  2:45 PM CDT   ESTABLISHED PRENATAL with Vianca Almeida DO   [facility]    [address]  [facility] [address] [phone]            Sep 06, 2017  2:45 PM CDT   ESTABLISHED PRENATAL with Vianca Almeida DO   [facility]    [address]  [facility] [address] [phone]              Who to contact     If you have questions or need follow up information about today's clinic visit or your schedule please contact [facility] directly at [phone].  Normal or non-critical lab and imaging results will be communicated to you by MyChart, letter or phone within 4 business days after the clinic has received the results. If you do not hear from us within 7 days, please contact the clinic through MyChart or phone. If you have a critical or abnormal lab result, we will notify you by  "phone as soon as possible.  Submit refill requests through [employer] or call your pharmacy and they will forward the refill request to us. Please allow 3 business days for your refill to be completed.          Additional Information About Your Visit        [employer] Information     [employer] gives you secure access to your electronic health record. If you see a primary care provider, you can also send messages to your care team and make appointments. If you have questions, please call your primary care clinic.  If you do not have a primary care provider, please call [phone] and they will assist you.        Care EveryWhere ID     This is your Care EveryWhere ID. This could be used by other organizations to access your [place] medical records  [id number]        Your Vitals Were     Height Last Period BMI (Body Mass Index)             5' 3\" (1.6 m) 12/14/2016 32.47 kg/m2          Blood Pressure from Last 3 Encounters:   08/08/17 104/66   07/27/17 108/70   07/12/17 110/66    Weight from Last 3 Encounters:   08/08/17 183 lb 4.8 oz (83.1 kg)   07/27/17 183 lb (83 kg)   07/12/17 180 lb 8 oz (81.9 kg)              Today, you had the following     No orders found for display         Today's Medication Changes          These changes are accurate as of: 8/8/17  2:41 PM.  If you have any questions, ask your nurse or doctor.               Start taking these medicines.        Dose/Directions    breast pump Misc   Used for:  Prenatal care in third trimester, Postpartum care and examination of lactating mother   Started by:  Vianca Almeida DO        Dose:  1 each   1 each daily   Quantity:  1 each   Refills:  3            Where to get your medicines      Some of these will need a paper prescription and others can be bought over the counter.  Ask your nurse if you have questions.     Bring a paper prescription for each of these medications     breast pump Misc                Primary Care Provider Office Phone # Fax #    Vianca " Odalis Almeida,  [phone]       [facility] [address]  [facility] [address]        Equal Access to Services     CASSANDRA GILBERT : Hadii aad ku hadganesho Soarianaali, waaxda luqadaha, qaybta kaalmada adenicolette, orly anain hayaaisabell valenzuelaclerika infante. So [facility] [phone].    ATENCIÓN: Si habla español, tiene a lee disposición servicios gratuitos de asistencia lingüística. Llame al [phone].    We comply with applicable federal civil rights laws and [place] laws. We do not discriminate on the basis of race, color, national origin, age, disability sex, sexual orientation or gender identity.            Thank you!     Thank you for choosing [facility]  for your care. Our goal is always to provide you with excellent care. Hearing back from our patients is one way we can continue to improve our services. Please take a few minutes to complete the written survey that you may receive in the mail after your visit with us. Thank you!             Your Updated Medication List - Protect others around you: Learn how to safely use, store and throw away your medicines at www.disposemymeds.org.          This list is accurate as of: 8/8/17  2:41 PM.  Always use your most recent med list.                   Brand Name Dispense Instructions for use Diagnosis    breast pump Misc     1 each    1 each daily    Prenatal care in third trimester, Postpartum care and examination of lactating mother       docusate sodium 100 MG tablet    COLACE    60 tablet    Take 100 mg by mouth daily    S/P laparoscopy       fluconazole 150 MG tablet    DIFLUCAN    4 tablet    Take 1 tablet (150 mg) by mouth every 3 days    Candidiasis of vulva and vagina       ondansetron 8 MG tablet    ZOFRAN    21 tablet    Take 1 tablet (8 mg) by mouth every 8 hours as needed for nausea    Nausea       prochlorperazine 10 MG tablet    COMPAZINE    40 tablet    Take 1 tablet (10 mg) by mouth every 6 hours as needed  for nausea or vomiting    Supervision of normal first pregnancy, antepartum, Nausea

## 2017-08-08 NOTE — PATIENT INSTRUCTIONS
Return in 2 weeks     Return to clinic:  every 4 weeks till 32 weeks, then every 2 weeks till 36 weeks, then weekly till delivery      Dr. Vianca Almeida,     Obstetrics and Gynecology  [facility] [place]

## 2017-08-10 ENCOUNTER — THERAPY VISIT (OUTPATIENT)
Dept: PHYSICAL THERAPY | Facility: CLINIC | Age: 32
End: 2017-08-10
Payer: COMMERCIAL

## 2017-08-10 DIAGNOSIS — O26.893 PREGNANCY WITH LOW BACK PAIN, ANTEPARTUM, THIRD TRIMESTER: ICD-10-CM

## 2017-08-10 DIAGNOSIS — M54.50 PREGNANCY WITH LOW BACK PAIN, ANTEPARTUM, THIRD TRIMESTER: ICD-10-CM

## 2017-08-10 PROCEDURE — 97112 NEUROMUSCULAR REEDUCATION: CPT | Mod: GP | Performed by: PHYSICAL THERAPIST

## 2017-08-10 PROCEDURE — 97140 MANUAL THERAPY 1/> REGIONS: CPT | Mod: GP | Performed by: PHYSICAL THERAPIST

## 2017-08-10 PROCEDURE — 97110 THERAPEUTIC EXERCISES: CPT | Mod: GP | Performed by: PHYSICAL THERAPIST

## 2017-08-11 ENCOUNTER — RADIANT APPOINTMENT (OUTPATIENT)
Dept: ULTRASOUND IMAGING | Facility: CLINIC | Age: 32
End: 2017-08-11
Attending: FAMILY MEDICINE
Payer: COMMERCIAL

## 2017-08-11 DIAGNOSIS — O26.843 UTERINE SIZE DATE DISCREPANCY, THIRD TRIMESTER: ICD-10-CM

## 2017-08-11 PROCEDURE — 76815 OB US LIMITED FETUS(S): CPT | Performed by: FAMILY MEDICINE

## 2017-08-23 ENCOUNTER — PRENATAL OFFICE VISIT (OUTPATIENT)
Dept: OBGYN | Facility: CLINIC | Age: 32
End: 2017-08-23
Payer: COMMERCIAL

## 2017-08-23 VITALS
SYSTOLIC BLOOD PRESSURE: 106 MMHG | BODY MASS INDEX: 32.37 KG/M2 | DIASTOLIC BLOOD PRESSURE: 78 MMHG | HEART RATE: 98 BPM | HEIGHT: 63 IN | WEIGHT: 182.7 LBS

## 2017-08-23 DIAGNOSIS — Z34.93 PRENATAL CARE, THIRD TRIMESTER: Primary | ICD-10-CM

## 2017-08-23 PROCEDURE — 99207 ZZC PRENATAL VISIT: CPT | Performed by: FAMILY MEDICINE

## 2017-08-23 RX ORDER — MULTIPLE VITAMINS W/ MINERALS TAB 9MG-400MCG
1 TAB ORAL DAILY
COMMUNITY
End: 2020-07-08

## 2017-08-23 NOTE — PATIENT INSTRUCTIONS
Return in 2 weeks   For ob check     Dr. Vianca Almeida, DO    Obstetrics and Gynecology  [facility] [place]

## 2017-08-23 NOTE — MR AVS SNAPSHOT
After Visit Summary   8/23/2017    Joann Patterson    MRN: [id number]           Patient Information     Date Of Birth          1985        Visit Information        Provider Department      8/23/2017 2:45 PM Vianca Almeida,  [facility]        Today's Diagnoses     Prenatal care, third trimester    -  1      Care Instructions    Return in 2 weeks   For ob check     Dr. Vianca lAmeida, DO    Obstetrics and Gynecology  [facility] and [place]                 Follow-ups after your visit        Your next 10 appointments already scheduled     Sep 06, 2017  2:45 PM CDT   ESTABLISHED PRENATAL with Vianca Almeida DO   [facility]    [address]  [facility] [id number] [phone]            Sep 14, 2017  2:45 PM CDT   ESTABLISHED PRENATAL with Vianca Almeida DO   [facility] ([facility])    [address]  [facility] [id number] [phone]            Sep 21, 2017  2:45 PM CDT   ESTABLISHED PRENATAL with Jocelyne Braun MD   [facility] ([facility])    [address]  [address]  [facility] [address] [phone]            Sep 27, 2017  2:45 PM CDT   ESTABLISHED PRENATAL with Vianca Almeida DO   [facility])    [address]  [facility] [address] [phone]              Who to contact     If you have questions or need follow up information about today's clinic visit or your schedule please contact [facility] directly at [phone].  Normal or non-critical lab and imaging results will be communicated to you by MyChart, letter or phone within 4 business days after the clinic has received the results. If you do not hear from us within 7 days, please contact the clinic through MyChart or phone. If you have a critical or  "abnormal lab result, we will notify you by phone as soon as possible.  Submit refill requests through [employer] or call your pharmacy and they will forward the refill request to us. Please allow 3 business days for your refill to be completed.          Additional Information About Your Visit        [employer] Information     [employer] gives you secure access to your electronic health record. If you see a primary care provider, you can also send messages to your care team and make appointments. If you have questions, please call your primary care clinic.  If you do not have a primary care provider, please call [phone] and they will assist you.        Care EveryWhere ID     This is your Care EveryWhere ID. This could be used by other organizations to access your [place] medical records  [id number]        Your Vitals Were     Pulse Height Last Period BMI (Body Mass Index)          98 5' 3\" (1.6 m) 12/14/2016 (Exact Date) 32.36 kg/m2         Blood Pressure from Last 3 Encounters:   08/23/17 106/78   08/08/17 104/66   07/27/17 108/70    Weight from Last 3 Encounters:   08/23/17 182 lb 11.2 oz (82.9 kg)   08/08/17 183 lb 4.8 oz (83.1 kg)   07/27/17 183 lb (83 kg)              Today, you had the following     No orders found for display       Primary Care Provider Office Phone # Fax #    Vianca MorenoDO olga [phone]       [address] [facility] [address]        Equal Access to Services     [facility] Center[facility] : Hadii aad ku hadasho Soomaali, waaxda luqadaha, qaybta kaalmada adeegyada, orly ennis . So [facility] [phone].    ATENCIÓN: Si habla español, tiene a lee disposición servicios gratuitos de asistencia lingüística. Llame al [phone].    We comply with applicable federal civil rights laws and [place] laws. We do not discriminate on the basis of race, color, national origin, age, disability sex, sexual orientation or gender identity.            Thank you!     " Thank you for choosing [facility]  for your care. Our goal is always to provide you with excellent care. Hearing back from our patients is one way we can continue to improve our services. Please take a few minutes to complete the written survey that you may receive in the mail after your visit with us. Thank you!             Your Updated Medication List - Protect others around you: Learn how to safely use, store and throw away your medicines at www.disposemymeds.org.          This list is accurate as of: 8/23/17  2:47 PM.  Always use your most recent med list.                   Brand Name Dispense Instructions for use Diagnosis    breast pump Misc     1 each    1 each daily    Prenatal care in third trimester, Postpartum care and examination of lactating mother       docusate sodium 100 MG tablet    COLACE    60 tablet    Take 100 mg by mouth daily    S/P laparoscopy       fluconazole 150 MG tablet    DIFLUCAN    4 tablet    Take 1 tablet (150 mg) by mouth every 3 days    Candidiasis of vulva and vagina       Multi-vitamin Tabs tablet      Take 1 tablet by mouth daily        ondansetron 8 MG tablet    ZOFRAN    21 tablet    Take 1 tablet (8 mg) by mouth every 8 hours as needed for nausea    Nausea       prochlorperazine 10 MG tablet    COMPAZINE    40 tablet    Take 1 tablet (10 mg) by mouth every 6 hours as needed for nausea or vomiting    Supervision of normal first pregnancy, antepartum, Nausea

## 2017-08-23 NOTE — NURSING NOTE
"Chief Complaint   Patient presents with     Prenatal Care     34+2       Initial /78  Pulse 98  Ht 5' 3\" (1.6 m)  Wt 182 lb 11.2 oz (82.9 kg)  LMP 2016 (Exact Date)  BMI 32.36 kg/m2 Estimated body mass index is 32.36 kg/(m^2) as calculated from the following:    Height as of this encounter: 5' 3\" (1.6 m).    Weight as of this encounter: 182 lb 11.2 oz (82.9 kg).  BP completed using cuff size: regular        The following HM Due: NONE      The following patient reported/Care Every where data was sent to:  P ABSTRACT QUALITY INITIATIVES [[id number]]  NA     patient has appointment for today    Romina MACIEL               "

## 2017-09-06 ENCOUNTER — PRENATAL OFFICE VISIT (OUTPATIENT)
Dept: OBGYN | Facility: CLINIC | Age: 32
End: 2017-09-06
Payer: COMMERCIAL

## 2017-09-06 VITALS
HEART RATE: 68 BPM | WEIGHT: 184.4 LBS | DIASTOLIC BLOOD PRESSURE: 78 MMHG | BODY MASS INDEX: 32.67 KG/M2 | HEIGHT: 63 IN | SYSTOLIC BLOOD PRESSURE: 112 MMHG

## 2017-09-06 DIAGNOSIS — Z34.03 ENCOUNTER FOR SUPERVISION OF NORMAL FIRST PREGNANCY IN THIRD TRIMESTER: Primary | ICD-10-CM

## 2017-09-06 DIAGNOSIS — L29.9 PRURITIC CONDITION: ICD-10-CM

## 2017-09-06 DIAGNOSIS — O26.643 CHOLESTASIS OF PREGNANCY IN THIRD TRIMESTER: ICD-10-CM

## 2017-09-06 PROCEDURE — 80053 COMPREHEN METABOLIC PANEL: CPT | Performed by: FAMILY MEDICINE

## 2017-09-06 PROCEDURE — 87653 STREP B DNA AMP PROBE: CPT | Performed by: FAMILY MEDICINE

## 2017-09-06 PROCEDURE — 36415 COLL VENOUS BLD VENIPUNCTURE: CPT | Performed by: FAMILY MEDICINE

## 2017-09-06 PROCEDURE — 83789 MASS SPECTROMETRY QUAL/QUAN: CPT | Mod: 90 | Performed by: FAMILY MEDICINE

## 2017-09-06 PROCEDURE — 99000 SPECIMEN HANDLING OFFICE-LAB: CPT | Performed by: FAMILY MEDICINE

## 2017-09-06 PROCEDURE — 99207 ZZC PRENATAL VISIT: CPT | Performed by: FAMILY MEDICINE

## 2017-09-06 NOTE — PATIENT INSTRUCTIONS
Return weekly     Phone numbers [place]:  Day/ night [phone] ask for ob triage  Emergency:  Call labor and delivery:  [phone]    What should I call about??    Contraction every 5 minutes for 1 hour, bleeding, loss of fluid, headache that doesn't resolve with tylenol, and decreased fetal movement       [facility] Address   [address], [place] [address]  [phone]    Dr. Vianca Almeida, DO    OB/GYN   [facility] [place]

## 2017-09-06 NOTE — NURSING NOTE
"Chief Complaint   Patient presents with     Prenatal Care     36+2       Initial /78  Pulse 68  Ht 5' 3\" (1.6 m)  Wt 184 lb 6.4 oz (83.6 kg)  LMP 2016 (Exact Date)  BMI 32.66 kg/m2 Estimated body mass index is 32.66 kg/(m^2) as calculated from the following:    Height as of this encounter: 5' 3\" (1.6 m).    Weight as of this encounter: 184 lb 6.4 oz (83.6 kg).  BP completed using cuff size: regular        The following HM Due: NONE      The following patient reported/Care Every where data was sent to:  P ABSTRACT QUALITY INITIATIVES [[id number]]  NA     patient has appointment for today    Romina MACIEL               "

## 2017-09-06 NOTE — MR AVS SNAPSHOT
After Visit Summary   9/6/2017    Joann Patterson    MRN: [id number]           Patient Information     Date Of Birth          1985        Visit Information        Provider Department      9/6/2017 2:45 PM Vianca Almeida DO [facility]        Today's Diagnoses     Encounter for supervision of normal first pregnancy in third trimester    -  1      Care Instructions    Return weekly     Phone numbers [place]:  Day/ night [phone] ask for ob triage  Emergency:  Call labor and delivery:  [phone]    What should I call about??    Contraction every 5 minutes for 1 hour, bleeding, loss of fluid, headache that doesn't resolve with tylenol, and decreased fetal movement       [facility] Address   [address] [facility] [place] [address] [phone]    Dr. Vianca Almeida, DO    OB/GYN   [facility] and [facility]                    Follow-ups after your visit        Follow-up notes from your care team     Return in about 1 week (around 9/13/2017).      Your next 10 appointments already scheduled     Sep 14, 2017  2:45 PM CDT   ESTABLISHED PRENATAL with Vianca Almeida DO   [facility]    [address] [place]  [facility] [address] [phone]            Sep 21, 2017  2:45 PM CDT   ESTABLISHED PRENATAL with Jocelyne Braun MD   [facility] ([facility])    [address]  [address]  [facility] [address] [phone]            Sep 27, 2017  2:45 PM CDT   ESTABLISHED PRENATAL with Vianca Almeida DO   [facility]    [address] [facility] [address] [phone]              Future tests that were ordered for you today     Open Standing Orders        Priority Remaining Interval Expires Ordered    US OB Limited One Or More Fetuses Routine 2/2 3 weeks 9/6/2018 9/6/2017           "  Who to contact     If you have questions or need follow up information about today's clinic visit or your schedule please contact [facility] directly at [phone].  Normal or non-critical lab and imaging results will be communicated to you by MyChart, letter or phone within 4 business days after the clinic has received the results. If you do not hear from us within 7 days, please contact the clinic through [employer] or phone. If you have a critical or abnormal lab result, we will notify you by phone as soon as possible.  Submit refill requests through ebridge or call your pharmacy and they will forward the refill request to us. Please allow 3 business days for your refill to be completed.          Additional Information About Your Visit        ebridge Information     [employer]bridge gives you secure access to your electronic health record. If you see a primary care provider, you can also send messages to your care team and make appointments. If you have questions, please call your primary care clinic.  If you do not have a primary care provider, please call [phone] and they will assist you.        Care EveryWhere ID     This is your Care EveryWhere ID. This could be used by other organizations to access your [place] medical records  [id number]        Your Vitals Were     Pulse Height Last Period BMI (Body Mass Index)          68 5' 3\" (1.6 m) 12/14/2016 (Exact Date) 32.66 kg/m2         Blood Pressure from Last 3 Encounters:   09/06/17 112/78   08/23/17 106/78   08/08/17 104/66    Weight from Last 3 Encounters:   09/06/17 184 lb 6.4 oz (83.6 kg)   08/23/17 182 lb 11.2 oz (82.9 kg)   08/08/17 183 lb 4.8 oz (83.1 kg)              We Performed the Following     Strep, Group B by PCR        Primary Care Provider Office Phone # Fax #    Vianca Almeida [phone]       [address]  [facility] [address]        Equal Access to Services     CASSANDRA GILBERT AH: Carlos loyd " Sozulma, watravonda luqadaha, qaybta kaallester bonilla, orly anain hayaan terrellmadeleine biancachuy laDarinanila naresh. So [facility] [phone].    ATENCIÓN: Si maria g elise, tiene a lee disposición servicios gratuitos de asistencia lingüística. Kellee al [phone].    We comply with applicable federal civil rights laws and [place] laws. We do not discriminate on the basis of race, color, national origin, age, disability sex, sexual orientation or gender identity.            Thank you!     Thank you for choosing [facility]  for your care. Our goal is always to provide you with excellent care. Hearing back from our patients is one way we can continue to improve our services. Please take a few minutes to complete the written survey that you may receive in the mail after your visit with us. Thank you!             Your Updated Medication List - Protect others around you: Learn how to safely use, store and throw away your medicines at www.disposemymeds.org.          This list is accurate as of: 9/6/17  2:58 PM.  Always use your most recent med list.                   Brand Name Dispense Instructions for use Diagnosis    breast pump Misc     1 each    1 each daily    Prenatal care in third trimester, Postpartum care and examination of lactating mother       docusate sodium 100 MG tablet    COLACE    60 tablet    Take 100 mg by mouth daily    S/P laparoscopy       fluconazole 150 MG tablet    DIFLUCAN    4 tablet    Take 1 tablet (150 mg) by mouth every 3 days    Candidiasis of vulva and vagina       Multi-vitamin Tabs tablet      Take 1 tablet by mouth daily        ondansetron 8 MG tablet    ZOFRAN    21 tablet    Take 1 tablet (8 mg) by mouth every 8 hours as needed for nausea    Nausea       prochlorperazine 10 MG tablet    COMPAZINE    40 tablet    Take 1 tablet (10 mg) by mouth every 6 hours as needed for nausea or vomiting    Supervision of normal first pregnancy, antepartum, Nausea

## 2017-09-07 ENCOUNTER — TELEPHONE (OUTPATIENT)
Dept: OBGYN | Facility: CLINIC | Age: 32
End: 2017-09-07

## 2017-09-07 LAB
ALBUMIN SERPL-MCNC: 2.2 G/DL (ref 3.4–5)
ALP SERPL-CCNC: 226 U/L (ref 40–150)
ALT SERPL W P-5'-P-CCNC: 201 U/L (ref 0–50)
ANION GAP SERPL CALCULATED.3IONS-SCNC: 10 MMOL/L (ref 3–14)
AST SERPL W P-5'-P-CCNC: 118 U/L (ref 0–45)
BILIRUB SERPL-MCNC: 0.5 MG/DL (ref 0.2–1.3)
BUN SERPL-MCNC: 8 MG/DL (ref 7–30)
CALCIUM SERPL-MCNC: 9.2 MG/DL (ref 8.5–10.1)
CHLORIDE SERPL-SCNC: 107 MMOL/L (ref 94–109)
CO2 SERPL-SCNC: 22 MMOL/L (ref 20–32)
CREAT SERPL-MCNC: 0.49 MG/DL (ref 0.52–1.04)
GFR SERPL CREATININE-BSD FRML MDRD: >90 ML/MIN/1.7M2
GLUCOSE SERPL-MCNC: 69 MG/DL (ref 70–99)
GP B STREP DNA SPEC QL NAA+PROBE: NEGATIVE
POTASSIUM SERPL-SCNC: 4.1 MMOL/L (ref 3.4–5.3)
PROT SERPL-MCNC: 6.8 G/DL (ref 6.8–8.8)
SODIUM SERPL-SCNC: 139 MMOL/L (ref 133–144)
SPECIMEN SOURCE: NORMAL

## 2017-09-07 RX ORDER — URSODIOL 300 MG/1
300 CAPSULE ORAL 2 TIMES DAILY
Qty: 60 CAPSULE | Refills: 0 | Status: SHIPPED | OUTPATIENT
Start: 2017-09-07 | End: 2017-09-20

## 2017-09-07 NOTE — TELEPHONE ENCOUNTER
Pt scheduled for cervical ripening on 9/11/17 with induction to follow on 9/12/17 at [facility]. Pt advised to call L/D at 6:30pm on 9/11/17 and L/D telephone number given.    Induction form faxed to L/D and induction placed on [place].  Lulu Gonzalez CMA

## 2017-09-07 NOTE — TELEPHONE ENCOUNTER
Please let her know I will call her this afternoon    Dr. Vianca Almeida, DO    Obstetrics and Gynecology  [facility]

## 2017-09-07 NOTE — TELEPHONE ENCOUNTER
Patient calling to report.  36w3d  Baby active  G1,P0  Would like results of labs.  Very uncomfortable, very itchy, not able to sleep, decreased appetite, nausea  Bile acids not back yet, but CMP is back  Please advise.  [phone]  Ok to leave message on VM  Linda Byrnes RN

## 2017-09-08 ENCOUNTER — HOSPITAL ENCOUNTER (OUTPATIENT)
Dept: ULTRASOUND IMAGING | Facility: CLINIC | Age: 32
Discharge: HOME | End: 2017-09-08
Attending: FAMILY MEDICINE | Admitting: OBSTETRICS & GYNECOLOGY
Payer: COMMERCIAL

## 2017-09-08 ENCOUNTER — OFFICE VISIT (OUTPATIENT)
Dept: MATERNAL FETAL MEDICINE | Facility: CLINIC | Age: 32
End: 2017-09-08
Attending: FAMILY MEDICINE
Payer: COMMERCIAL

## 2017-09-08 DIAGNOSIS — O26.643 CHOLESTASIS DURING PREGNANCY IN THIRD TRIMESTER: Primary | ICD-10-CM

## 2017-09-08 DIAGNOSIS — O26.90 PREGNANCY RELATED CONDITION, UNSPECIFIED TRIMESTER: ICD-10-CM

## 2017-09-08 PROCEDURE — 76819 FETAL BIOPHYS PROFIL W/O NST: CPT

## 2017-09-08 NOTE — MR AVS SNAPSHOT
After Visit Summary   9/8/2017    Joann Patterson    MRN: [id number]           Patient Information     Date Of Birth          1985        Visit Information        Provider Department      9/8/2017 3:30 PM Keely Mann MD [facility] Maternal Fetal Medicine [facility]        Today's Diagnoses     Cholestasis during pregnancy in third trimester    -  1       Follow-ups after your visit        Your next 10 appointments already scheduled     Sep 11, 2017  2:45 PM CDT   US OB LIMITED ONE OR MORE FETUSES with OXUS1   [facility] ([facility]    [address] [id number] [phone]           Please bring a list of your medicines (including vitamins, minerals and over-the-counter drugs). Also, tell your doctor about any allergies you may have. Wear comfortable clothes and leave your valuables at home.  If you re less than 20 weeks drink four 8-ounce glasses of fluid an hour before your exam. If you need to empty your bladder before your exam, try to release only a little urine. Then, drink another glass of fluid.  You may have up to two family members in the exam room. If you bring a small child, an adult must be there to care for him or her.  Please call the Imaging Department at your exam site with any questions.            Sep 14, 2017  2:45 PM CDT   ESTABLISHED PRENATAL with Vianca Almeida DO   [facility]    [address]  [facility] [address]   [phone]            Sep 21, 2017  2:45 PM CDT   ESTABLISHED PRENATAL with Jocelyne Braun MD   [facility] ([facility]    [address]  [address]  [facility] [address] [phone]            Sep 26, 2017  3:30 PM CDT   US OB LIMITED ONE OR MORE FETUSES with OXUS1   [facility] ([facility])    [address] [place] [address]  [facility] [address] [phone]           Please bring a list of your medicines (including vitamins, minerals and over-the-counter drugs). Also, tell your doctor about any allergies you may have. Wear comfortable clothes and leave your valuables at home.  If you re less than 20 weeks drink four 8-ounce glasses of fluid an hour before your exam. If you need to empty your bladder before your exam, try to release only a little urine. Then, drink another glass of fluid.  You may have up to two family members in the exam room. If you bring a small child, an adult must be there to care for him or her.  Please call the Imaging Department at your exam site with any questions.            Sep 27, 2017  2:45 PM CDT   ESTABLISHED PRENATAL with Vianca Almeida,    [facility]    [address]  [facility] [address] [phone]              Future tests that were ordered for you today     Open Future Orders        Priority Expected Expires Ordered    [facility] Single Routine  7/7/2018 9/7/2017    [facility] Single Routine  7/7/2018 9/7/2017            Who to contact     If you have questions or need follow up information about today's clinic visit or your schedule please contact [facility] MATERNAL FETAL MEDICINE [facility] directly at [phone].  Normal or non-critical lab and imaging results will be communicated to you by MyChart, letter or phone within 4 business days after the clinic has received the results. If you do not hear from us within 7 days, please contact the clinic through MyChart or phone. If you have a critical or abnormal lab result, we will notify you by phone as soon as possible.  Submit refill requests through [employer] or call your pharmacy and they will forward the refill request to us. Please allow 3 business days for your refill to be completed.          Additional Information About Your Visit        My[employer]Ace[employer] Information      [facility] gives you secure access to your electronic health record. If you see a primary care provider, you can also send messages to your care team and make appointments. If you have questions, please call your primary care clinic.  If you do not have a primary care provider, please call [phone] and they will assist you.        Care EveryWhere ID     This is your Care EveryWhere ID. This could be used by other organizations to access your [place] medical records  [id number]        Your Vitals Were     Last Period                   12/14/2016 (Exact Date)            Blood Pressure from Last 3 Encounters:   09/06/17 112/78   08/23/17 106/78   08/08/17 104/66    Weight from Last 3 Encounters:   09/06/17 83.6 kg (184 lb 6.4 oz)   08/23/17 82.9 kg (182 lb 11.2 oz)   08/08/17 83.1 kg (183 lb 4.8 oz)              Today, you had the following     No orders found for display       Primary Care Provider Office Phone # Fax #    Vianca Odalis Almeida,  [phone]       [address] [facility] [address]        Equal Access to Services     [facility]: Hadii aad ku hadasho Soomaali, waaxda luqadaha, qaybta kaalmada adeegyada, orly ennis . So [facility] [phone].    ATENCIÓN: Si habla español, tiene a lee disposición servicios gratuitos de asistencia lingüística. Llame al [phone].    We comply with applicable federal civil rights laws and [place] laws. We do not discriminate on the basis of race, color, national origin, age, disability sex, sexual orientation or gender identity.            Thank you!     Thank you for choosing [facility]ER[facility]ETAL [facility]IC[facility]  for your care. Our goal is always to provide you with excellent care. Hearing back from our patients is one way we can continue to improve our services. Please take a few minutes to complete the written survey that you may receive in the mail after your visit with us. Thank you!             Your  Updated Medication List - Protect others around you: Learn how to safely use, store and throw away your medicines at www.disposemymeds.org.          This list is accurate as of: 9/8/17  5:58 PM.  Always use your most recent med list.                   Brand Name Dispense Instructions for use Diagnosis    breast pump Misc     1 each    1 each daily    Prenatal care in third trimester, Postpartum care and examination of lactating mother       docusate sodium 100 MG tablet    COLACE    60 tablet    Take 100 mg by mouth daily    S/P laparoscopy       fluconazole 150 MG tablet    DIFLUCAN    4 tablet    Take 1 tablet (150 mg) by mouth every 3 days    Candidiasis of vulva and vagina       Multi-vitamin Tabs tablet      Take 1 tablet by mouth daily        ondansetron 8 MG tablet    ZOFRAN    21 tablet    Take 1 tablet (8 mg) by mouth every 8 hours as needed for nausea    Nausea       prochlorperazine 10 MG tablet    COMPAZINE    40 tablet    Take 1 tablet (10 mg) by mouth every 6 hours as needed for nausea or vomiting    Supervision of normal first pregnancy, antepartum, Nausea       ursodiol 300 MG capsule    ACTIGALL    60 capsule    Take 1 capsule (300 mg) by mouth 2 times daily    Cholestasis of pregnancy in third trimester

## 2017-09-11 ENCOUNTER — HOSPITAL ENCOUNTER (INPATIENT)
Facility: CLINIC | Age: 32
LOS: 3 days | Discharge: HOME | End: 2017-09-14
Attending: OBSTETRICS & GYNECOLOGY | Admitting: OBSTETRICS & GYNECOLOGY
Payer: COMMERCIAL

## 2017-09-11 LAB
ABO + RH BLD: NORMAL
ABO + RH BLD: NORMAL
BLD GP AB SCN SERPL QL: NORMAL
BLOOD BANK CMNT PATIENT-IMP: NORMAL
SPECIMEN EXP DATE BLD: NORMAL

## 2017-09-11 PROCEDURE — 3E0P7GC INTRODUCTION OF OTHER THERAPEUTIC SUBSTANCE INTO FEMALE REPRODUCTIVE, VIA NATURAL OR ARTIFICIAL OPENING: ICD-10-PCS | Performed by: OBSTETRICS & GYNECOLOGY

## 2017-09-11 PROCEDURE — 12000031 ZZH R&B OB CRITICAL

## 2017-09-11 PROCEDURE — 86780 TREPONEMA PALLIDUM: CPT | Performed by: OBSTETRICS & GYNECOLOGY

## 2017-09-11 PROCEDURE — 10907ZC DRAINAGE OF AMNIOTIC FLUID, THERAPEUTIC FROM PRODUCTS OF CONCEPTION, VIA NATURAL OR ARTIFICIAL OPENING: ICD-10-PCS | Performed by: OBSTETRICS & GYNECOLOGY

## 2017-09-11 PROCEDURE — 3E033VJ INTRODUCTION OF OTHER HORMONE INTO PERIPHERAL VEIN, PERCUTANEOUS APPROACH: ICD-10-PCS | Performed by: OBSTETRICS & GYNECOLOGY

## 2017-09-11 PROCEDURE — 86850 RBC ANTIBODY SCREEN: CPT | Performed by: OBSTETRICS & GYNECOLOGY

## 2017-09-11 PROCEDURE — 25000132 ZZH RX MED GY IP 250 OP 250 PS 637: Performed by: OBSTETRICS & GYNECOLOGY

## 2017-09-11 PROCEDURE — 86901 BLOOD TYPING SEROLOGIC RH(D): CPT | Performed by: OBSTETRICS & GYNECOLOGY

## 2017-09-11 PROCEDURE — 86900 BLOOD TYPING SEROLOGIC ABO: CPT | Performed by: OBSTETRICS & GYNECOLOGY

## 2017-09-11 RX ORDER — ZOLPIDEM TARTRATE 5 MG/1
5 TABLET ORAL
Status: DISCONTINUED | OUTPATIENT
Start: 2017-09-11 | End: 2017-09-12

## 2017-09-11 RX ORDER — CALCIUM CARBONATE 500 MG/1
2 TABLET, CHEWABLE ORAL DAILY
COMMUNITY
End: 2017-09-20

## 2017-09-11 RX ORDER — LIDOCAINE 40 MG/G
CREAM TOPICAL
Status: DISCONTINUED | OUTPATIENT
Start: 2017-09-11 | End: 2017-09-12

## 2017-09-11 RX ADMIN — DINOPROSTONE 10 MG: 10 INSERT VAGINAL at 20:29

## 2017-09-11 NOTE — IP AVS SNAPSHOT
MRN:[id number]                      After Visit Summary   9/11/2017    Joann Patterson    MRN: [id number]           Thank you!     Thank you for choosing [facility] for your care. Our goal is always to provide you with excellent care. Hearing back from our patients is one way we can continue to improve our services. Please take a few minutes to complete the written survey that you may receive in the mail after you visit. If you would like to speak to someone directly about your visit please contact Patient Relations at [phone]. Thank you!          Patient Information     Date Of Birth          1985        Designated Caregiver       Most Recent Value    Caregiver    Will someone help with your care after discharge? no      About your hospital stay     You were admitted on:  September 11, 2017 You last received care in the:  [facility] Postpartum    You were discharged on:  September 14, 2017       Who to Call     For medical emergencies, please call 911.  For non-urgent questions about your medical care, please call your primary care provider or clinic, [phone]          Attending Provider     Provider Specialty    Edgard Maldonado MD OB/Gyn    [facility]       Primary Care Provider Office Phone # Fax #    Vianca Jacobo [place],  [phone]      Further instructions from your care team       Postpartum Vaginal Delivery Instructions    Activity       Ask family and friends for help when you need it.    Do not place anything in your vagina for 6 weeks.    You are not restricted on other activities, but take it easy for a few weeks to allow your body to recover from delivery.  You are able to do any activities you feel up to that point.    No driving until you have stopped taking your pain medications (usually two weeks after delivery).     Call your health care provider if you have any of these symptoms:       Increased pain,  swelling, redness, or fluid around your stiches from an episiotomy or perineal tear.    A fever above 100.4 F (38 C) with or without chills when placing a thermometer under your tongue.    You soak a sanitary pad with blood within 1 hour, or you see blood clots larger than a golf ball.    Bleeding that lasts more than 6 weeks.    Vaginal discharge that smells bad.    Severe pain, cramping or tenderness in your lower belly area.    A need to urinate more frequently (use the toilet more often), more urgently (use the toilet very quickly), or it burns when you urinate.    Nausea and vomiting.    Redness, swelling or pain around a vein in your leg.    Problems breastfeeding or a red or painful area on your breast.    Chest pain and cough or are gasping for air.    Problems coping with sadness, anxiety, or depression.  If you have any concerns about hurting yourself or the baby, call your provider immediately.     You have questions or concerns after you return home.     Keep your hands clean:  Always wash your hands before touching your perineal area and stitches.  This helps reduce your risk of infection.  If your hands aren't dirty, you may use an alcohol hand-rub to clean your hands. Keep your nails clean and short.      Follow up in 6 weeks   Call [phone] or [phone] for appointment     Call and ask to be seen or talk to a doctor for the following: (You can go to the ob triage button   On answering service line) .     Increased bleeding, fever, general unwell feeling or increased pain.     Dr. Vianca Almeida,     OB/GYN   [facility]          Pending Results     No orders found from 9/9/2017 to 9/12/2017.            Statement of Approval     Ordered          09/14/17 0947  I have reviewed and agree with all the recommendations and orders detailed in this document.  EFFECTIVE NOW     Approved and electronically signed by:  Vianca Almeida DO            "  Admission Information     Date & Time Provider Department Dept. Phone    9/11/2017 Jocelyne Braun MD [facility] [phone]      Your Vitals Were     Blood Pressure Pulse Temperature Respirations Height Weight    127/77 79 98.1  F (36.7  C) (Oral) 20 1.6 m (5' 3\") 83.5 kg (184 lb)    Last Period BMI (Body Mass Index)                12/14/2016 (Exact Date) 32.59 kg/m2          MyChart Information     [facility] gives you secure access to your electronic health record. If you see a primary care provider, you can also send messages to your care team and make appointments. If you have questions, please call your primary care clinic.  If you do not have a primary care provider, please call [phone] and they will assist you.        Care EveryWhere ID     This is your Care EveryWhere ID. This could be used by other organizations to access your [place] medical records  [id number]        Equal Access to Services     CASSANDRA GILBERT : Carlos Hamilton, watravonda sae, qaybta kaalmaadrianne bonilla, orly ennis . So [facility] [phone].    ATENCIÓN: Si habla español, tiene a lee disposición servicios gratuitos de asistencia lingüística. Llame al [phone].    We comply with applicable federal civil rights laws and [place] laws. We do not discriminate on the basis of race, color, national origin, age, disability sex, sexual orientation or gender identity.               Review of your medicines      START taking        Dose / Directions    ibuprofen 800 MG tablet   Commonly known as:  ADVIL/MOTRIN        Dose:  800 mg   Take 1 tablet (800 mg) by mouth every 8 hours as needed for moderate pain   Quantity:  90 tablet   Refills:  1         CONTINUE these medicines which may have CHANGED, or have new prescriptions. If we are uncertain of the size of tablets/capsules you have at home, strength may be listed as something that might have changed.        Dose / " Directions    * docusate sodium 100 MG tablet   Commonly known as:  COLACE   This may have changed:  Another medication with the same name was added. Make sure you understand how and when to take each.   Used for:  S/P laparoscopy        Dose:  100 mg   Take 100 mg by mouth daily   Quantity:  60 tablet   Refills:  1       * docusate sodium 100 MG tablet   Commonly known as:  COLACE   This may have changed:  You were already taking a medication with the same name, and this prescription was added. Make sure you understand how and when to take each.        Dose:  100 mg   Take 100 mg by mouth daily   Quantity:  60 tablet   Refills:  1       * Notice:  This list has 2 medication(s) that are the same as other medications prescribed for you. Read the directions carefully, and ask your doctor or other care provider to review them with you.      CONTINUE these medicines which have NOT CHANGED        Dose / Directions    breast pump Misc   Used for:  Prenatal care in third trimester, Postpartum care and examination of lactating mother        Dose:  1 each   1 each daily   Quantity:  1 each   Refills:  3       calcium carbonate 500 MG chewable tablet   Commonly known as:  TUMS        Dose:  2 chew tab   Take 2 chew tab by mouth daily   Refills:  0       fluconazole 150 MG tablet   Commonly known as:  DIFLUCAN   Used for:  Candidiasis of vulva and vagina        Dose:  150 mg   Take 1 tablet (150 mg) by mouth every 3 days   Quantity:  4 tablet   Refills:  3       Multi-vitamin Tabs tablet        Dose:  1 tablet   Take 1 tablet by mouth daily   Refills:  0       ondansetron 8 MG tablet   Commonly known as:  ZOFRAN   Used for:  Nausea        Dose:  8 mg   Take 1 tablet (8 mg) by mouth every 8 hours as needed for nausea   Quantity:  21 tablet   Refills:  3       prochlorperazine 10 MG tablet   Commonly known as:  COMPAZINE   Used for:  Supervision of normal first pregnancy, antepartum, Nausea        Dose:  10 mg   Take 1 tablet  (10 mg) by mouth every 6 hours as needed for nausea or vomiting   Quantity:  40 tablet   Refills:  1       ursodiol 300 MG capsule   Commonly known as:  ACTIGALL   Used for:  Cholestasis of pregnancy in third trimester        Dose:  300 mg   Take 1 capsule (300 mg) by mouth 2 times daily   Quantity:  60 capsule   Refills:  0            Where to get your medicines      These medications were sent to [place], [place] - [address] [facility]  [address] [facility] [place] [facility] [address]     Phone:  [phone]     docusate sodium 100 MG tablet    ibuprofen 800 MG tablet                Protect others around you: Learn how to safely use, store and throw away your medicines at www.disposemymeds.org.             Medication List: This is a list of all your medications and when to take them. Check marks below indicate your daily home schedule. Keep this list as a reference.      Medications           Morning Afternoon Evening Bedtime As Needed    breast pump Misc   1 each daily                                calcium carbonate 500 MG chewable tablet   Commonly known as:  TUMS   Take 2 chew tab by mouth daily   Last time this was given:  1,000 mg on 9/12/2017  8:43 AM                                * docusate sodium 100 MG tablet   Commonly known as:  COLACE   Take 100 mg by mouth daily                                * docusate sodium 100 MG tablet   Commonly known as:  COLACE   Take 100 mg by mouth daily                                fluconazole 150 MG tablet   Commonly known as:  DIFLUCAN   Take 1 tablet (150 mg) by mouth every 3 days                                ibuprofen 800 MG tablet   Commonly known as:  ADVIL/MOTRIN   Take 1 tablet (800 mg) by mouth every 8 hours as needed for moderate pain   Last time this was given:  800 mg on 9/14/2017  5:19 AM                                Multi-vitamin Tabs tablet   Take 1 tablet by mouth daily   Last time this was given:  1 tablet on 9/14/2017   7:58 AM                                ondansetron 8 MG tablet   Commonly known as:  ZOFRAN   Take 1 tablet (8 mg) by mouth every 8 hours as needed for nausea                                prochlorperazine 10 MG tablet   Commonly known as:  COMPAZINE   Take 1 tablet (10 mg) by mouth every 6 hours as needed for nausea or vomiting                                ursodiol 300 MG capsule   Commonly known as:  ACTIGALL   Take 1 capsule (300 mg) by mouth 2 times daily                                * Notice:  This list has 2 medication(s) that are the same as other medications prescribed for you. Read the directions carefully, and ask your doctor or other care provider to review them with you.

## 2017-09-11 NOTE — IP AVS SNAPSHOT
[facility]    [address]    [facility] [address]    Phone:  [phone]    Fax:  [phone]                                       After Visit Summary   9/11/2017    Joann Patterson    MRN: [id number]           After Visit Summary Signature Page     I have received my discharge instructions, and my questions have been answered. I have discussed any challenges I see with this plan with the nurse or doctor.    ..........................................................................................................................................  Patient/Patient Representative Signature      ..........................................................................................................................................  Patient Representative Print Name and Relationship to Patient    ..................................................               ................................................  Date                                            Time    ..........................................................................................................................................  Reviewed by Signature/Title    ...................................................              ..............................................  Date                                                            Time

## 2017-09-12 ENCOUNTER — ANESTHESIA (OUTPATIENT)
Dept: OBGYN | Facility: CLINIC | Age: 32
End: 2017-09-12
Payer: COMMERCIAL

## 2017-09-12 ENCOUNTER — ANESTHESIA EVENT (OUTPATIENT)
Dept: OBGYN | Facility: CLINIC | Age: 32
End: 2017-09-12
Payer: COMMERCIAL

## 2017-09-12 LAB — T PALLIDUM IGG+IGM SER QL: NEGATIVE

## 2017-09-12 PROCEDURE — 40000671 ZZH STATISTIC ANESTHESIA CASE

## 2017-09-12 PROCEDURE — 37000011 ZZH ANESTHESIA WARD SERVICE

## 2017-09-12 PROCEDURE — 25000128 H RX IP 250 OP 636: Performed by: OBSTETRICS & GYNECOLOGY

## 2017-09-12 PROCEDURE — 25000132 ZZH RX MED GY IP 250 OP 250 PS 637: Performed by: OBSTETRICS & GYNECOLOGY

## 2017-09-12 PROCEDURE — 0UQMXZZ REPAIR VULVA, EXTERNAL APPROACH: ICD-10-PCS | Performed by: OBSTETRICS & GYNECOLOGY

## 2017-09-12 PROCEDURE — 12000031 ZZH R&B OB CRITICAL

## 2017-09-12 PROCEDURE — 00HU33Z INSERTION OF INFUSION DEVICE INTO SPINAL CANAL, PERCUTANEOUS APPROACH: ICD-10-PCS | Performed by: OBSTETRICS & GYNECOLOGY

## 2017-09-12 PROCEDURE — 25000125 ZZHC RX 250: Performed by: OBSTETRICS & GYNECOLOGY

## 2017-09-12 PROCEDURE — 25000128 H RX IP 250 OP 636

## 2017-09-12 PROCEDURE — 0HQ9XZZ REPAIR PERINEUM SKIN, EXTERNAL APPROACH: ICD-10-PCS | Performed by: OBSTETRICS & GYNECOLOGY

## 2017-09-12 PROCEDURE — 59400 OBSTETRICAL CARE: CPT | Performed by: OBSTETRICS & GYNECOLOGY

## 2017-09-12 PROCEDURE — 3E0S3CZ INTRODUCTION OF REGIONAL ANESTHETIC INTO EPIDURAL SPACE, PERCUTANEOUS APPROACH: ICD-10-PCS | Performed by: OBSTETRICS & GYNECOLOGY

## 2017-09-12 PROCEDURE — 72200001 ZZH LABOR CARE VAGINAL DELIVERY SINGLE

## 2017-09-12 RX ORDER — EPHEDRINE SULFATE 50 MG/ML
5 INJECTION, SOLUTION INTRAMUSCULAR; INTRAVENOUS; SUBCUTANEOUS
Status: DISCONTINUED | OUTPATIENT
Start: 2017-09-12 | End: 2017-09-12

## 2017-09-12 RX ORDER — MISOPROSTOL 200 UG/1
400 TABLET ORAL
Status: DISCONTINUED | OUTPATIENT
Start: 2017-09-12 | End: 2017-09-14 | Stop reason: HOSPADM

## 2017-09-12 RX ORDER — IBUPROFEN 400 MG/1
400-800 TABLET, FILM COATED ORAL EVERY 6 HOURS PRN
Status: DISCONTINUED | OUTPATIENT
Start: 2017-09-12 | End: 2017-09-14 | Stop reason: HOSPADM

## 2017-09-12 RX ORDER — CALCIUM CARBONATE 500 MG/1
1000 TABLET, CHEWABLE ORAL 3 TIMES DAILY PRN
Status: DISCONTINUED | OUTPATIENT
Start: 2017-09-12 | End: 2017-09-12

## 2017-09-12 RX ORDER — OXYTOCIN 10 [USP'U]/ML
10 INJECTION, SOLUTION INTRAMUSCULAR; INTRAVENOUS
Status: DISCONTINUED | OUTPATIENT
Start: 2017-09-12 | End: 2017-09-12

## 2017-09-12 RX ORDER — HYDROCORTISONE 2.5 %
CREAM (GRAM) TOPICAL 3 TIMES DAILY PRN
Status: DISCONTINUED | OUTPATIENT
Start: 2017-09-12 | End: 2017-09-14 | Stop reason: HOSPADM

## 2017-09-12 RX ORDER — FENTANYL CITRATE 50 UG/ML
INJECTION, SOLUTION INTRAMUSCULAR; INTRAVENOUS
Status: COMPLETED
Start: 2017-09-12 | End: 2017-09-12

## 2017-09-12 RX ORDER — IBUPROFEN 800 MG/1
800 TABLET, FILM COATED ORAL
Status: DISCONTINUED | OUTPATIENT
Start: 2017-09-12 | End: 2017-09-12

## 2017-09-12 RX ORDER — NALOXONE HYDROCHLORIDE 0.4 MG/ML
.1-.4 INJECTION, SOLUTION INTRAMUSCULAR; INTRAVENOUS; SUBCUTANEOUS
Status: DISCONTINUED | OUTPATIENT
Start: 2017-09-12 | End: 2017-09-12

## 2017-09-12 RX ORDER — OXYTOCIN/0.9 % SODIUM CHLORIDE 30/500 ML
100 PLASTIC BAG, INJECTION (ML) INTRAVENOUS CONTINUOUS
Status: DISCONTINUED | OUTPATIENT
Start: 2017-09-12 | End: 2017-09-14 | Stop reason: HOSPADM

## 2017-09-12 RX ORDER — CARBOPROST TROMETHAMINE 250 UG/ML
250 INJECTION, SOLUTION INTRAMUSCULAR
Status: DISCONTINUED | OUTPATIENT
Start: 2017-09-12 | End: 2017-09-12

## 2017-09-12 RX ORDER — SCOLOPAMINE TRANSDERMAL SYSTEM 1 MG/1
1 PATCH, EXTENDED RELEASE TRANSDERMAL ONCE
Status: DISCONTINUED | OUTPATIENT
Start: 2017-09-12 | End: 2017-09-12

## 2017-09-12 RX ORDER — OXYTOCIN/0.9 % SODIUM CHLORIDE 30/500 ML
340 PLASTIC BAG, INJECTION (ML) INTRAVENOUS CONTINUOUS PRN
Status: DISCONTINUED | OUTPATIENT
Start: 2017-09-12 | End: 2017-09-14 | Stop reason: HOSPADM

## 2017-09-12 RX ORDER — ONDANSETRON 4 MG/1
4 TABLET, ORALLY DISINTEGRATING ORAL EVERY 6 HOURS PRN
Status: DISCONTINUED | OUTPATIENT
Start: 2017-09-12 | End: 2017-09-12

## 2017-09-12 RX ORDER — SODIUM CHLORIDE, SODIUM LACTATE, POTASSIUM CHLORIDE, CALCIUM CHLORIDE 600; 310; 30; 20 MG/100ML; MG/100ML; MG/100ML; MG/100ML
INJECTION, SOLUTION INTRAVENOUS CONTINUOUS
Status: DISCONTINUED | OUTPATIENT
Start: 2017-09-12 | End: 2017-09-12

## 2017-09-12 RX ORDER — BISACODYL 10 MG
10 SUPPOSITORY, RECTAL RECTAL DAILY PRN
Status: DISCONTINUED | OUTPATIENT
Start: 2017-09-14 | End: 2017-09-14 | Stop reason: HOSPADM

## 2017-09-12 RX ORDER — OXYTOCIN/0.9 % SODIUM CHLORIDE 30/500 ML
100-340 PLASTIC BAG, INJECTION (ML) INTRAVENOUS CONTINUOUS PRN
Status: COMPLETED | OUTPATIENT
Start: 2017-09-12 | End: 2017-09-12

## 2017-09-12 RX ORDER — ACETAMINOPHEN 325 MG/1
650 TABLET ORAL EVERY 4 HOURS PRN
Status: DISCONTINUED | OUTPATIENT
Start: 2017-09-12 | End: 2017-09-14 | Stop reason: HOSPADM

## 2017-09-12 RX ORDER — NALBUPHINE HYDROCHLORIDE 10 MG/ML
2.5-5 INJECTION, SOLUTION INTRAMUSCULAR; INTRAVENOUS; SUBCUTANEOUS EVERY 6 HOURS PRN
Status: DISCONTINUED | OUTPATIENT
Start: 2017-09-12 | End: 2017-09-12

## 2017-09-12 RX ORDER — MULTIPLE VITAMINS W/ MINERALS TAB 9MG-400MCG
1 TAB ORAL DAILY
Status: DISCONTINUED | OUTPATIENT
Start: 2017-09-13 | End: 2017-09-14 | Stop reason: HOSPADM

## 2017-09-12 RX ORDER — OXYTOCIN 10 [USP'U]/ML
10 INJECTION, SOLUTION INTRAMUSCULAR; INTRAVENOUS
Status: DISCONTINUED | OUTPATIENT
Start: 2017-09-12 | End: 2017-09-14 | Stop reason: HOSPADM

## 2017-09-12 RX ORDER — OXYTOCIN/0.9 % SODIUM CHLORIDE 30/500 ML
1-24 PLASTIC BAG, INJECTION (ML) INTRAVENOUS CONTINUOUS
Status: DISCONTINUED | OUTPATIENT
Start: 2017-09-12 | End: 2017-09-12

## 2017-09-12 RX ORDER — ONDANSETRON 2 MG/ML
4 INJECTION INTRAMUSCULAR; INTRAVENOUS EVERY 6 HOURS PRN
Status: DISCONTINUED | OUTPATIENT
Start: 2017-09-12 | End: 2017-09-12

## 2017-09-12 RX ORDER — NALOXONE HYDROCHLORIDE 0.4 MG/ML
.1-.4 INJECTION, SOLUTION INTRAMUSCULAR; INTRAVENOUS; SUBCUTANEOUS
Status: DISCONTINUED | OUTPATIENT
Start: 2017-09-12 | End: 2017-09-14 | Stop reason: HOSPADM

## 2017-09-12 RX ORDER — OXYCODONE AND ACETAMINOPHEN 5; 325 MG/1; MG/1
1 TABLET ORAL
Status: DISCONTINUED | OUTPATIENT
Start: 2017-09-12 | End: 2017-09-12

## 2017-09-12 RX ORDER — METHYLERGONOVINE MALEATE 0.2 MG/ML
200 INJECTION INTRAVENOUS
Status: DISCONTINUED | OUTPATIENT
Start: 2017-09-12 | End: 2017-09-12

## 2017-09-12 RX ORDER — HYDROMORPHONE HYDROCHLORIDE 1 MG/ML
INJECTION, SOLUTION INTRAMUSCULAR; INTRAVENOUS; SUBCUTANEOUS
Status: DISCONTINUED
Start: 2017-09-12 | End: 2017-09-12 | Stop reason: WASHOUT

## 2017-09-12 RX ORDER — LIDOCAINE 40 MG/G
CREAM TOPICAL
Status: DISCONTINUED | OUTPATIENT
Start: 2017-09-12 | End: 2017-09-12

## 2017-09-12 RX ORDER — EPHEDRINE SULFATE 50 MG/ML
INJECTION, SOLUTION INTRAMUSCULAR; INTRAVENOUS; SUBCUTANEOUS
Status: DISCONTINUED
Start: 2017-09-12 | End: 2017-09-13 | Stop reason: HOSPADM

## 2017-09-12 RX ORDER — LANOLIN 100 %
OINTMENT (GRAM) TOPICAL
Status: DISCONTINUED | OUTPATIENT
Start: 2017-09-12 | End: 2017-09-14 | Stop reason: HOSPADM

## 2017-09-12 RX ORDER — DIPHENHYDRAMINE HCL 25 MG
50-75 CAPSULE ORAL EVERY 6 HOURS PRN
Status: DISCONTINUED | OUTPATIENT
Start: 2017-09-12 | End: 2017-09-12

## 2017-09-12 RX ORDER — AMOXICILLIN 250 MG
1-2 CAPSULE ORAL 2 TIMES DAILY
Status: DISCONTINUED | OUTPATIENT
Start: 2017-09-12 | End: 2017-09-14 | Stop reason: HOSPADM

## 2017-09-12 RX ORDER — OXYCODONE HYDROCHLORIDE 5 MG/1
5-10 TABLET ORAL
Status: DISCONTINUED | OUTPATIENT
Start: 2017-09-12 | End: 2017-09-14 | Stop reason: HOSPADM

## 2017-09-12 RX ORDER — ACETAMINOPHEN 325 MG/1
650 TABLET ORAL EVERY 4 HOURS PRN
Status: DISCONTINUED | OUTPATIENT
Start: 2017-09-12 | End: 2017-09-12

## 2017-09-12 RX ADMIN — SENNOSIDES AND DOCUSATE SODIUM 1 TABLET: 8.6; 5 TABLET ORAL at 22:32

## 2017-09-12 RX ADMIN — LIDOCAINE HYDROCHLORIDE 20 ML: 10 INJECTION, SOLUTION INFILTRATION; PERINEURAL at 21:40

## 2017-09-12 RX ADMIN — SODIUM CHLORIDE, POTASSIUM CHLORIDE, SODIUM LACTATE AND CALCIUM CHLORIDE 1000 ML: 600; 310; 30; 20 INJECTION, SOLUTION INTRAVENOUS at 15:07

## 2017-09-12 RX ADMIN — CALCIUM CARBONATE (ANTACID) CHEW TAB 500 MG 1000 MG: 500 CHEW TAB at 08:43

## 2017-09-12 RX ADMIN — OXYTOCIN-SODIUM CHLORIDE 0.9% IV SOLN 30 UNIT/500ML 340 ML/HR: 30-0.9/5 SOLUTION at 21:40

## 2017-09-12 RX ADMIN — DIPHENHYDRAMINE HYDROCHLORIDE 75 MG: 25 CAPSULE ORAL at 04:45

## 2017-09-12 RX ADMIN — SODIUM CHLORIDE, POTASSIUM CHLORIDE, SODIUM LACTATE AND CALCIUM CHLORIDE: 600; 310; 30; 20 INJECTION, SOLUTION INTRAVENOUS at 09:20

## 2017-09-12 RX ADMIN — DEXTROSE AND SODIUM CHLORIDE: 5; 900 INJECTION, SOLUTION INTRAVENOUS at 17:45

## 2017-09-12 RX ADMIN — Medication: at 16:13

## 2017-09-12 RX ADMIN — FENTANYL CITRATE 100 MCG: 50 INJECTION INTRAMUSCULAR; INTRAVENOUS at 16:12

## 2017-09-12 RX ADMIN — ACETAMINOPHEN 650 MG: 325 TABLET, FILM COATED ORAL at 10:44

## 2017-09-12 RX ADMIN — OXYTOCIN-SODIUM CHLORIDE 0.9% IV SOLN 30 UNIT/500ML 2 MILLI-UNITS/MIN: 30-0.9/5 SOLUTION at 09:25

## 2017-09-12 RX ADMIN — IBUPROFEN 800 MG: 400 TABLET ORAL at 22:32

## 2017-09-12 NOTE — PROVIDER NOTIFICATION
09/12/17 1130   Provider Notification   Provider Name/Title Dr Braun   Method of Notification Phone   Request Evaluate - Remote   Notification Reason Uterine Activity;Pain;Status Update   Dr Braun updated on UC pattern every 1.5-2 minutes, unable to increase pitocin level. Pt not feeling much pain with contractions just tightening and cramps. MD to come AROM.

## 2017-09-12 NOTE — PROVIDER NOTIFICATION
09/12/17 1155   Provider Notification   Provider Name/Title Dr Barun   Method of Notification At Bedside   Request Evaluate in Person   Notification Reason Membrane Status;Uterine Activity;Status Update;SVE   Dr Braun bedside for AROM, bloody fluid noted. MD reviewed FHR strip and UC pattern and pitocin level.  Epidural orders received.

## 2017-09-12 NOTE — PROVIDER NOTIFICATION
09/11/17 2019   Provider Notification   Provider Name/Title Dr. Maldonado   Method of Notification Phone   Request Evaluate - Remote   Notification Reason Patient Arrived   Dr. Maldonado updated on patient arrival. Patient requesting to eat a MD olive okay with this. Orders received for cervidil and ambien.

## 2017-09-12 NOTE — PROVIDER NOTIFICATION
09/12/17 1316   Vaginal Bleeding   Vaginal Bleeding present   Characteristics (Vaginal Bleeding) painless;with clots   Patient up to the bathroom, patient's towel and chux pad were wet with pinkish amniotic fluid. Patient passed a few quarter sized clots in the toilet and one outside of the toilet. Update RN caring for patient and she will continue to monitor for continued bleeding

## 2017-09-12 NOTE — PROVIDER NOTIFICATION
09/12/17 0442   Provider Notification   Provider Name/Title Dr. Maldonado   Method of Notification Phone   Request Evaluate - Remote   Notification Reason Other (Comment)  (itching)   order received for benadryl

## 2017-09-12 NOTE — ANESTHESIA PROCEDURE NOTES
Peripheral nerve/Neuraxial procedure note :        Assessment/Narrative  .  .  . Comments:  Pre-Procedure  Performed by Rhys Fernandes MD  Location: OB.      PreAnesthestic Checklist: patient identified, IV checked, risks and benefits discussed, informed consent obtained, monitors and equipment checked, pre-op evaluation and at physician/surgeon's request.    Timeout   Correct Patient: Yes  Correct Procedure: Epidural catheter placement  Correct Site: Yes   Correct Position: Yes    Procedure Documentation  Procedure:   Epidural catheter block for Labor    Patient currently in labor and she and OBMD request a labor epidural to control her labor pains. Patient was interviewed and examined. Procedure and risks including but not limited to bleeding, infection, nerve injury, paralysis, PDPH, and inadequate block requiring intervention discussed with patient. Questions answered. This epidural is to be placed in anticipation of vaginal delivery.  She consents to the epidural procedure.  Time-out was performed.  I or my partners remain immediately available for management of any issues or complications and will monitor at appropriate intervals.  Procedure: Patient sitting. Betadine prep x 3. Sterile drape applied.  Lidocaine 1%  local infiltration at L 3-4.  17 G. Tuohy needle at L3-4 by loss of resistance into epidural space.  No CSF, paresthesia or blood. 1.5 % Lidocaine with 1:200,000 Epinephrine 5cc test dose. Then 0.25% bupivicaine 10 cc with NS 5 cc.  Epidural catheter inserted w/o resistance to 5 cm in epidural space.  Aspiration negative for blood and CSF.   Negative for neuro change, paresthesia or symptoms of intravascular injection or intrathecal injection.  Infusion orders written and infusion of 0.125% bupivicaine 15cc per hour started.    Rhys Fernandes MD

## 2017-09-12 NOTE — PROVIDER NOTIFICATION
09/12/17 1656   Provider Notification   Provider Name/Title Dr Aparicio   Method of Notification In Department   Request Evaluate - Remote   Notification Reason Status Update;MATT BUSH in department. Updated on SVE and that pt has epidural. Orders to place hardy and switch fluids to D5 Normal Saline. Updated on FHTs and VD decles.

## 2017-09-12 NOTE — PROVIDER NOTIFICATION
09/12/17 0905   Provider Notification   Provider Name/Title Dr Braun   Method of Notification Phone   Request Evaluate - Remote   Notification Reason SVE   update re:2/70/-2 posterior. Orders to start pitocin and AROM per Aparna later.

## 2017-09-12 NOTE — PLAN OF CARE
here at 37 weeks for induction of labor due to cholestasis. External monitors applied and explained. Health history and physical assessment obtained. No c/o vaginal bleeding or LOF. Positive fetal movement felt by patient. FHT's 130's, accels present, no decels. Patient feeling irregular contractions. Will call MD to inform of patients arrive and for orders.

## 2017-09-12 NOTE — PLAN OF CARE
Patient slept off and on through out night shift with C/o back pain.  This a.m. Does feel some cramping.  ROM intact, no bloody show.  Bedside report given to Joanne ALONZO RN and jennifer escudero

## 2017-09-12 NOTE — H&P
No significant change in general health status based on examination of the patient, review of Nursing Admission Database and prenatal record.    EFW: 6lb 8oz

## 2017-09-12 NOTE — PROVIDER NOTIFICATION
09/12/17 1800   Provider Notification   Provider Name/Title Dr Braun   Method of Notification Phone   Request Evaluate - Remote   Notification Reason SELENAE     MD updated that pt is complete but still -2 station. Notified that hardy was placed but that the bulb of the hardy was preventing the head from moving so it was removed. MD ok with this plan. Straight cath before pushing. Labor down for 2 hours. Call MD when start to push

## 2017-09-13 PROCEDURE — 12000027 ZZH R&B OB

## 2017-09-13 PROCEDURE — 25000132 ZZH RX MED GY IP 250 OP 250 PS 637: Performed by: OBSTETRICS & GYNECOLOGY

## 2017-09-13 RX ADMIN — IBUPROFEN 800 MG: 400 TABLET ORAL at 17:12

## 2017-09-13 RX ADMIN — IBUPROFEN 800 MG: 400 TABLET ORAL at 11:20

## 2017-09-13 RX ADMIN — ACETAMINOPHEN 650 MG: 325 TABLET, FILM COATED ORAL at 09:04

## 2017-09-13 RX ADMIN — MULTIPLE VITAMINS W/ MINERALS TAB 1 TABLET: TAB at 11:02

## 2017-09-13 RX ADMIN — SENNOSIDES AND DOCUSATE SODIUM 2 TABLET: 8.6; 5 TABLET ORAL at 11:02

## 2017-09-13 RX ADMIN — IBUPROFEN 800 MG: 400 TABLET ORAL at 23:19

## 2017-09-13 RX ADMIN — ACETAMINOPHEN 650 MG: 325 TABLET, FILM COATED ORAL at 05:10

## 2017-09-13 RX ADMIN — IBUPROFEN 800 MG: 400 TABLET ORAL at 05:10

## 2017-09-13 RX ADMIN — ACETAMINOPHEN 650 MG: 325 TABLET, FILM COATED ORAL at 21:55

## 2017-09-13 RX ADMIN — SENNOSIDES AND DOCUSATE SODIUM 1 TABLET: 8.6; 5 TABLET ORAL at 21:18

## 2017-09-13 RX ADMIN — ACETAMINOPHEN 650 MG: 325 TABLET, FILM COATED ORAL at 13:09

## 2017-09-13 RX ADMIN — ACETAMINOPHEN 650 MG: 325 TABLET, FILM COATED ORAL at 17:12

## 2017-09-13 NOTE — ANESTHESIA POSTPROCEDURE EVALUATION
Patient: Joann Patterson    * Diagnosis Additional Information: labor    Anesthesia Type:  Epidural    Note:  Anesthesia Post Evaluation    Last vitals:  Vitals:    09/13/17 0105 09/13/17 0220 09/13/17 0510   BP:  109/68 105/69   Pulse:  82 86   Resp:   18   Temp: 98  F (36.7  C)           S/P epidural for labor.   I or my partner was immediately available for management of this patient during epidural analgesia infusion.  VSS.  Doing well. Block resolved.  Neuro at baseline. Denies positional headache. Minimal side effects easily managed w/ PRN meds. No apparent anesthetic complications. No follow-up required.    Brad Lazo MD      Electronically Signed By: Brad Lazo MD  September 13, 2017  9:22 AM

## 2017-09-13 NOTE — L&D DELIVERY NOTE
Delivery Summary    Joann Patterson MRN# [id number]   Age: 32 year old YOB: 1985     ASSESSMENT & PLAN: 32 year old female   Obstetric History       T0      L0     SAB0   TAB0   Ectopic0   Multiple0   Live Births0     at 37w1d admitted last PM for induction of labor due to cholestasis of pregnancy.   Received cervadil x 1 dose last PM.   Pitocin started this AM at 2 cm dilation.   AROM performed this morning with clear fluid noted.    Patient received epidural for pain management.    She progressed with pitocin induction to complete dilation; she was allowed to labor down for approximately 2 hours, then commenced pushing.    Delivered a viable male infant over intact perineum, without complications.   Placenta was delivered spontaneously after delayed cord clamp.   Repairs performed with 3.0 vicryl, usual fashion.   Mother and baby stable.        Labor Event Times    Labor onset date:  17 Onset time:   4:43 PM   Dilation complete date:  17 Complete time:   5:44 PM   Start pushing date/time:  2017 1946            Labor Events     labor?:  No    steroids:  None   Labor Type:  Induction   Predominate monitoring during 1st stage:  continuous electronic fetal monitoring      Rupture date/time:     Rupture type:  Artificial Rupture of Membranes      Induction:  Cervidil   Induction date/time:     Cervical ripening date/time:        Delivery/Placenta Date and Time    Delivery Date:  17 Delivery Time:   9:34 PM   Placenta Date/Time:  2017  9:40 PM   Oxytocin given at the time of delivery:  after delivery of baby      Vaginal Counts    Initial count performed by 2 team members:   Two Team Members   Danay Braun          Needles Suture [place] Sponges Instruments   Initial counts 2  5    Added to count  1     Final counts          Placed during labor Accounted for at the end of labor   NA NA   NA NA   NA NA      Final count performed by 2 team  members:   Two Team Members   JENNIFER Covarrubias Dr          Final count correct?:  Yes         Apgars    Living status:  Living    1 Minute 5 Minute 10 Minute 15 Minute 20 Minute   Skin color: 0  1       Heart rate: 2  2       Reflex irritability: 2  2       Muscle tone: 2  2       Respiratory effort: 2  2       Total: 8  9          Apgars assigned by:  CLIFF RIOS RN      Cord    Vessels:  3 Vessels Complications:  None   Cord Blood Disposition:  Lab Gases Sent?:  No         Alsen Resuscitation    Methods:  None         Skin to Skin and Feeding Plan    Skin to skin initiation date/time: 17   Skin to skin with:  Mother   Skin to skin end date/time:     How do you plan to feed your baby:  Breastfeeding      Labor Events and Shoulder Dystocia    Fetal Tracing Prior to Delivery:  Category 2   Shoulder dystocia present?:  Neg            Delivery (Maternal) (Provider to Complete) (320797)    Episiotomy:  None   Perineal lacerations:  1st Repaired?:  Yes   Labial laceration:  left Repaired?:  Yes   Vaginal laceration?:  No    Cervical laceration?:  No          Mother's Information  Mother: Joann Patterson #[id number]    Start of Mother's Information     IO Blood Loss  17 1643 - 17 2202    Mom's I/O Activity            End of Mother's Information  Mother: Joann Patterson #[id number]            Delivery - Provider to Complete (396148)    Delivering clinician:  YOJANA JACK   Delivery Type (Choose the 1 that will go to the Birth History):  Vaginal, Spontaneous Delivery                           Placenta    Delayed Cord Clamping:  Done   Date/Time:  2017  9:40 PM   Removal:  Spontaneous   Disposition:  Hospital disposal      Anesthesia    Method:  Epidural         Presentation and Position    Presentation:  Vertex   Position:  Middle Occiput Anterior                    Yojana Jack MD

## 2017-09-13 NOTE — PLAN OF CARE
Problem: Goal Outcome Summary  Goal: Goal Outcome Summary  Outcome: No Change  Pt able to get some rest between cares w/  rooming-in.  States ordered pain medications and ice pack decreasing perineal and labial discomfort.  Voiding w/o difficulty.  FOB present and supportive.  Plan to continue to monitor.

## 2017-09-13 NOTE — PLAN OF CARE
Data: Joann Patterson transferred to 445 via wheelchair at 0030. Baby transferred via parent's arms.  Action: Receiving unit notified of transfer: Yes. Patient and family notified of room change. Report given to JENNIFER Mcleod at 0050. Belongings sent to receiving unit. Accompanied by Registered Nurse. Oriented patient to surroundings. Call light within reach. ID bands double-checked with receiving RN.  Response: Patient tolerated transfer and is stable.

## 2017-09-13 NOTE — LACTATION NOTE
LC to see patient.  She is a primip and baby latches well. She has been feeding him for long durations.  He is comfort sucking during most of the feeding.  LC reviewed the importance of keeping him awake and active to nurse and offering both sides with each feeding.  Pumping was also reviewed.  No other questions or concerns noted.

## 2017-09-13 NOTE — PLAN OF CARE
Problem: Goal Outcome Summary  Goal: Goal Outcome Summary  Outcome: Improving  Data: Vital signs within normal limits. Postpartum checks within normal limits - see flow record. Patient eating and drinking normally. Patient able to empty bladder independently and is up ambulating. Saline lock removed. No apparent signs of infection. Laceration healing well. Patient performing self cares and is able to care for infant.  Action: Patient medicated during the shift for cramping. See MAR. Patient reassessed within 1 hour after each medication and pain was improved - patient stated she was comfortable. Patient education done about postpartum cares. See flow record. Tucks and ice for comfort.  Response: Positive attachment behaviors observed with infant. Support persons Pieter present.   Plan: Anticipate discharge on 9/14/17.

## 2017-09-14 ENCOUNTER — TELEPHONE (OUTPATIENT)
Dept: OBGYN | Facility: CLINIC | Age: 32
End: 2017-09-14

## 2017-09-14 ENCOUNTER — APPOINTMENT (OUTPATIENT)
Dept: GENERAL RADIOLOGY | Facility: CLINIC | Age: 32
End: 2017-09-14
Attending: EMERGENCY MEDICINE
Payer: COMMERCIAL

## 2017-09-14 ENCOUNTER — HOSPITAL ENCOUNTER (EMERGENCY)
Facility: CLINIC | Age: 32
Discharge: HOME | End: 2017-09-14
Attending: EMERGENCY MEDICINE | Admitting: EMERGENCY MEDICINE
Payer: COMMERCIAL

## 2017-09-14 VITALS
SYSTOLIC BLOOD PRESSURE: 127 MMHG | HEART RATE: 79 BPM | RESPIRATION RATE: 20 BRPM | WEIGHT: 184 LBS | DIASTOLIC BLOOD PRESSURE: 77 MMHG | TEMPERATURE: 98.1 F | HEIGHT: 63 IN | BODY MASS INDEX: 32.6 KG/M2

## 2017-09-14 VITALS
OXYGEN SATURATION: 98 % | HEIGHT: 63 IN | SYSTOLIC BLOOD PRESSURE: 124 MMHG | HEART RATE: 115 BPM | RESPIRATION RATE: 16 BRPM | DIASTOLIC BLOOD PRESSURE: 73 MMHG | TEMPERATURE: 99.1 F

## 2017-09-14 LAB
ALBUMIN SERPL-MCNC: 2 G/DL (ref 3.4–5)
ALBUMIN UR-MCNC: NEGATIVE MG/DL
ALP SERPL-CCNC: 168 U/L (ref 40–150)
ALT SERPL W P-5'-P-CCNC: 92 U/L (ref 0–50)
ANION GAP SERPL CALCULATED.3IONS-SCNC: 7 MMOL/L (ref 3–14)
APPEARANCE UR: CLEAR
AST SERPL W P-5'-P-CCNC: 23 U/L (ref 0–45)
BACTERIA #/AREA URNS HPF: ABNORMAL /HPF
BASOPHILS # BLD AUTO: 0.1 10E9/L (ref 0–0.2)
BASOPHILS NFR BLD AUTO: 0.3 %
BILIRUB SERPL-MCNC: 0.3 MG/DL (ref 0.2–1.3)
BILIRUB UR QL STRIP: NEGATIVE
BUN SERPL-MCNC: 8 MG/DL (ref 7–30)
CALCIUM SERPL-MCNC: 8.7 MG/DL (ref 8.5–10.1)
CHLORIDE SERPL-SCNC: 107 MMOL/L (ref 94–109)
CO2 SERPL-SCNC: 25 MMOL/L (ref 20–32)
COLOR UR AUTO: ABNORMAL
CREAT SERPL-MCNC: 0.58 MG/DL (ref 0.52–1.04)
DIFFERENTIAL METHOD BLD: ABNORMAL
EOSINOPHIL # BLD AUTO: 0.4 10E9/L (ref 0–0.7)
EOSINOPHIL NFR BLD AUTO: 1.9 %
ERYTHROCYTE [DISTWIDTH] IN BLOOD BY AUTOMATED COUNT: 14.1 % (ref 10–15)
GFR SERPL CREATININE-BSD FRML MDRD: >90 ML/MIN/1.7M2
GLUCOSE SERPL-MCNC: 91 MG/DL (ref 70–99)
GLUCOSE UR STRIP-MCNC: NEGATIVE MG/DL
HCT VFR BLD AUTO: 36 % (ref 35–47)
HGB BLD-MCNC: 12.1 G/DL (ref 11.7–15.7)
HGB UR QL STRIP: ABNORMAL
IMM GRANULOCYTES # BLD: 0.3 10E9/L (ref 0–0.4)
IMM GRANULOCYTES NFR BLD: 1.3 %
KETONES UR STRIP-MCNC: NEGATIVE MG/DL
LEUKOCYTE ESTERASE UR QL STRIP: ABNORMAL
LIPASE SERPL-CCNC: 101 U/L (ref 73–393)
LYMPHOCYTES # BLD AUTO: 2.4 10E9/L (ref 0.8–5.3)
LYMPHOCYTES NFR BLD AUTO: 12.1 %
MCH RBC QN AUTO: 31.5 PG (ref 26.5–33)
MCHC RBC AUTO-ENTMCNC: 33.6 G/DL (ref 31.5–36.5)
MCV RBC AUTO: 94 FL (ref 78–100)
MONOCYTES # BLD AUTO: 1.3 10E9/L (ref 0–1.3)
MONOCYTES NFR BLD AUTO: 6.6 %
MUCOUS THREADS #/AREA URNS LPF: PRESENT /LPF
NEUTROPHILS # BLD AUTO: 15.3 10E9/L (ref 1.6–8.3)
NEUTROPHILS NFR BLD AUTO: 77.8 %
NITRATE UR QL: NEGATIVE
NRBC # BLD AUTO: 0 10*3/UL
NRBC BLD AUTO-RTO: 0 /100
PH UR STRIP: 7 PH (ref 5–7)
PLATELET # BLD AUTO: 335 10E9/L (ref 150–450)
POTASSIUM SERPL-SCNC: 3.8 MMOL/L (ref 3.4–5.3)
PROT SERPL-MCNC: 6.3 G/DL (ref 6.8–8.8)
RBC # BLD AUTO: 3.84 10E12/L (ref 3.8–5.2)
RBC #/AREA URNS AUTO: 5 /HPF (ref 0–2)
SODIUM SERPL-SCNC: 139 MMOL/L (ref 133–144)
SOURCE: ABNORMAL
SP GR UR STRIP: 1 (ref 1–1.03)
SQUAMOUS #/AREA URNS AUTO: 1 /HPF (ref 0–1)
UROBILINOGEN UR STRIP-MCNC: 0 MG/DL (ref 0–2)
WBC # BLD AUTO: 19.6 10E9/L (ref 4–11)
WBC #/AREA URNS AUTO: 39 /HPF (ref 0–2)

## 2017-09-14 PROCEDURE — 85025 COMPLETE CBC W/AUTO DIFF WBC: CPT | Performed by: EMERGENCY MEDICINE

## 2017-09-14 PROCEDURE — 80053 COMPREHEN METABOLIC PANEL: CPT | Performed by: EMERGENCY MEDICINE

## 2017-09-14 PROCEDURE — 99284 EMERGENCY DEPT VISIT MOD MDM: CPT | Mod: 25

## 2017-09-14 PROCEDURE — 87186 SC STD MICRODIL/AGAR DIL: CPT | Performed by: EMERGENCY MEDICINE

## 2017-09-14 PROCEDURE — 87088 URINE BACTERIA CULTURE: CPT | Performed by: EMERGENCY MEDICINE

## 2017-09-14 PROCEDURE — 40000083 ZZH STATISTIC IP LACTATION SERVICES 1-15 MIN

## 2017-09-14 PROCEDURE — 83690 ASSAY OF LIPASE: CPT | Performed by: EMERGENCY MEDICINE

## 2017-09-14 PROCEDURE — 25000132 ZZH RX MED GY IP 250 OP 250 PS 637: Performed by: EMERGENCY MEDICINE

## 2017-09-14 PROCEDURE — 36415 COLL VENOUS BLD VENIPUNCTURE: CPT | Performed by: EMERGENCY MEDICINE

## 2017-09-14 PROCEDURE — 81001 URINALYSIS AUTO W/SCOPE: CPT | Performed by: EMERGENCY MEDICINE

## 2017-09-14 PROCEDURE — 25000132 ZZH RX MED GY IP 250 OP 250 PS 637: Performed by: OBSTETRICS & GYNECOLOGY

## 2017-09-14 PROCEDURE — 71020 XR CHEST 2 VW: CPT

## 2017-09-14 PROCEDURE — 87086 URINE CULTURE/COLONY COUNT: CPT | Performed by: EMERGENCY MEDICINE

## 2017-09-14 RX ORDER — ASPIRIN 81 MG
100 TABLET, DELAYED RELEASE (ENTERIC COATED) ORAL DAILY
Qty: 60 TABLET | Refills: 1 | Status: SHIPPED | OUTPATIENT
Start: 2017-09-14 | End: 2017-09-20

## 2017-09-14 RX ORDER — AMOXICILLIN 500 MG/1
500 CAPSULE ORAL 2 TIMES DAILY
Qty: 14 CAPSULE | Refills: 0 | Status: SHIPPED | OUTPATIENT
Start: 2017-09-14 | End: 2017-09-20

## 2017-09-14 RX ORDER — IBUPROFEN 800 MG/1
800 TABLET, FILM COATED ORAL EVERY 8 HOURS PRN
Qty: 90 TABLET | Refills: 1 | Status: SHIPPED | OUTPATIENT
Start: 2017-09-14 | End: 2020-07-08

## 2017-09-14 RX ORDER — AMOXICILLIN 500 MG/1
500 CAPSULE ORAL ONCE
Status: COMPLETED | OUTPATIENT
Start: 2017-09-14 | End: 2017-09-14

## 2017-09-14 RX ORDER — CEFTRIAXONE 1 G/1
1 INJECTION, POWDER, FOR SOLUTION INTRAMUSCULAR; INTRAVENOUS ONCE
Status: DISCONTINUED | OUTPATIENT
Start: 2017-09-14 | End: 2017-09-14

## 2017-09-14 RX ADMIN — IBUPROFEN 800 MG: 400 TABLET ORAL at 05:19

## 2017-09-14 RX ADMIN — ACETAMINOPHEN 650 MG: 325 TABLET, FILM COATED ORAL at 07:57

## 2017-09-14 RX ADMIN — AMOXICILLIN 500 MG: 500 CAPSULE ORAL at 20:01

## 2017-09-14 RX ADMIN — SENNOSIDES AND DOCUSATE SODIUM 2 TABLET: 8.6; 5 TABLET ORAL at 07:58

## 2017-09-14 RX ADMIN — MULTIPLE VITAMINS W/ MINERALS TAB 1 TABLET: TAB at 07:58

## 2017-09-14 RX ADMIN — ACETAMINOPHEN 650 MG: 325 TABLET, FILM COATED ORAL at 02:26

## 2017-09-14 ASSESSMENT — ENCOUNTER SYMPTOMS
HEADACHES: 0
FREQUENCY: 0
DYSURIA: 0
ABDOMINAL PAIN: 1
COUGH: 0
SHORTNESS OF BREATH: 0
FEVER: 1
HEMATURIA: 0

## 2017-09-14 NOTE — DISCHARGE INSTRUCTIONS
Postpartum Vaginal Delivery Instructions    Activity       Ask family and friends for help when you need it.    Do not place anything in your vagina for 6 weeks.    You are not restricted on other activities, but take it easy for a few weeks to allow your body to recover from delivery.  You are able to do any activities you feel up to that point.    No driving until you have stopped taking your pain medications (usually two weeks after delivery).     Call your health care provider if you have any of these symptoms:       Increased pain, swelling, redness, or fluid around your stiches from an episiotomy or perineal tear.    A fever above 100.4 F (38 C) with or without chills when placing a thermometer under your tongue.    You soak a sanitary pad with blood within 1 hour, or you see blood clots larger than a golf ball.    Bleeding that lasts more than 6 weeks.    Vaginal discharge that smells bad.    Severe pain, cramping or tenderness in your lower belly area.    A need to urinate more frequently (use the toilet more often), more urgently (use the toilet very quickly), or it burns when you urinate.    Nausea and vomiting.    Redness, swelling or pain around a vein in your leg.    Problems breastfeeding or a red or painful area on your breast.    Chest pain and cough or are gasping for air.    Problems coping with sadness, anxiety, or depression.  If you have any concerns about hurting yourself or the baby, call your provider immediately.     You have questions or concerns after you return home.     Keep your hands clean:  Always wash your hands before touching your perineal area and stitches.  This helps reduce your risk of infection.  If your hands aren't dirty, you may use an alcohol hand-rub to clean your hands. Keep your nails clean and short.      Follow up in 6 weeks   Call [phone] or [phone] for appointment     Call and ask to be seen or talk to a doctor for the following: (You can go to the ob  triage button   On answering service line) .     Increased bleeding, fever, general unwell feeling or increased pain.     Dr. Vianca Almeida, DO    OB/GYN   [facility]

## 2017-09-14 NOTE — PROGRESS NOTES
Obstetrics Post-Partum Progress Note          Assessment and Plan:    Assessment:   Post-partum day #1  Normal spontaneous vaginal delivery  L&D complications: None        Doing well.      Plan:   Anticipate discharge tomorrow           Interval History:   Doing well.  Pain is well-controlled.  No fevers.  No history of foul-smelling vaginal discharge.  Good appetite.  Denies chest pain, shortness of breath, nausea or vomiting.  Vaginal bleeding is similar to a heavy menstrual flow.  Ambulatory.  Breastfeeding well.            Significant Problems:    None          Review of Systems:    The patient denies any chest pain, shortness of breath, excessive pain, fever, chills, purulent drainage from the wound, nausea or vomiting.          Medications:   All medications related to the patient's surgery have been reviewed          Physical Exam:     All vitals stable  Patient Vitals for the past 12 hrs:   BP Temp Temp src Pulse Resp   09/13/17 1048 131/78 98.5  F (36.9  C) Oral 89 18     Uterine fundus is firm, non-tender and at the level of the umbilicus          Data:     All laboratory data related to this surgery reviewed  Hemoglobin   Date Value Ref Range Status   07/12/2017 12.0 11.7 - 15.7 g/dL Final   02/22/2017 14.4 11.7 - 15.7 g/dL Final   03/15/2016 15.2 11.7 - 15.7 g/dL Final   03/08/2016 15.1 11.7 - 15.7 g/dL Final   09/05/2013 14.9 11.7 - 15.7 g/dL Final     No imaging studies have been ordered  Rhys Hagan MD, MD

## 2017-09-14 NOTE — TELEPHONE ENCOUNTER
Form received from: patient    Form requesting following info/need: [employer] Short Term Disability    XENIA needed?: No    Location of form: [place] / Dr. Almeida    When completed the route for return: Fax to [facility] at fax #[phone]

## 2017-09-14 NOTE — DISCHARGE SUMMARY
32 year old  y/o  s/p  ppd # 2  hemoglobin is   Hemoglobin   Date Value Ref Range Status   2017 12.0 11.7 - 15.7 g/dL Final   ]    d/c home   On colace, breast pump and ibuprofen ferrous sulfate oxycodone   Follow-up in 6 weeks for post partum examination    [facility]   Post-Partum Progress Note          Assessment and Plan:    Assessment:   Post-partum day #2  Normal spontaneous vaginal delivery  L&D complications: None      Doing well.  Pain well-controlled.      Plan:   Ambulation encouraged  Discharge later today           Interval History:   Doing well.  Pain is well-controlled.  No fevers.  No history of foul-smelling vaginal discharge.  Good appetite.  Denies chest pain, shortness of breath, nausea or vomiting.  Vaginal bleeding is similar to a heavy menstrual flow.  Ambulatory.  Breastfeeding well.            Significant Problems:    None          Review of Systems:    C: NEGATIVE for fever, chills, change in weight  I: NEGATIVE for worrisome rashes, moles or lesions  E: NEGATIVE for vision changes or irritation  E/M: NEGATIVE for ear, mouth and throat problems  R: NEGATIVE for significant cough or SOB  B: NEGATIVE for masses, tenderness or discharge  CV: NEGATIVE for chest pain, palpitations or peripheral edema  GI: NEGATIVE for nausea, abdominal pain, heartburn, or change in bowel habits  : NEGATIVE for frequency, dysuria, or hematuria  M: NEGATIVE for significant arthralgias or myalgia  N: NEGATIVE for weakness, dizziness or paresthesias  E: NEGATIVE for temperature intolerance, skin/hair changes  H: NEGATIVE for bleeding problems  P: NEGATIVE for changes in mood or affect          Medications:   All medications related to the patient's surgery have been reviewed  -          Physical Exam:     All vitals stable  Patient Vitals for the past 8 hrs:   BP Temp Temp src Pulse Heart Rate Resp   17 0757 127/77 98.1  F (36.7  C) Oral 79 - 20   17 0519 127/76 - Oral -  99 16     Uterine fundus is firm, non-tender and at the level of the umbilicus          Data:     All laboratory data related to this surgery reviewed  Hemoglobin   Date Value Ref Range Status   07/12/2017 12.0 11.7 - 15.7 g/dL Final   02/22/2017 14.4 11.7 - 15.7 g/dL Final   03/15/2016 15.2 11.7 - 15.7 g/dL Final   03/08/2016 15.1 11.7 - 15.7 g/dL Final   09/05/2013 14.9 11.7 - 15.7 g/dL Final     -  Vianca Almeida, DO      Dr. Vianca Almeida, DO    OB/GYN   [facility] and [facility]

## 2017-09-14 NOTE — ED AVS SNAPSHOT
[facility] Emergency Department    [address]    [facility] [address]    Phone:  [phone]    Fax:  [phone]                                       Joann Patterson   MRN: [id number]    Department:  [facility] Emergency Department   Date of Visit:  9/14/2017           After Visit Summary Signature Page     I have received my discharge instructions, and my questions have been answered. I have discussed any challenges I see with this plan with the nurse or doctor.    ..........................................................................................................................................  Patient/Patient Representative Signature      ..........................................................................................................................................  Patient Representative Print Name and Relationship to Patient    ..................................................               ................................................  Date                                            Time    ..........................................................................................................................................  Reviewed by Signature/Title    ...................................................              ..............................................  Date                                                            Time

## 2017-09-14 NOTE — TELEPHONE ENCOUNTER
Pt will head to ED as soon as she can.    Rosana Tucker R.N.  [facility]

## 2017-09-14 NOTE — PLAN OF CARE
Problem: Goal Outcome Summary  Goal: Goal Outcome Summary  Outcome: Improving  Pt able to get some rest between cares w/  rooming-in for night.  Using heat pack, ice and tucks for comfort measures.  Also taking ibuprofen and tylenol for discomfort w/ relief.  FOB present and helpful w/ cares.  Nipple cream and gel pads to tender nipples.  Anticipate d/c later today.

## 2017-09-14 NOTE — ED AVS SNAPSHOT
[facility] Emergency Department    [address]    [facility] [address]    Phone:  [phone]    Fax:  [phone]                                       Joann Patterson   MRN: [id number]    Department:  [facility] Emergency Department   Date of Visit:  9/14/2017           Patient Information     Date Of Birth          1985        Your diagnoses for this visit were:     Postpartum urinary tract infection        You were seen by Daniel Cronin MD.      Follow-up Information     Follow up with Vianca Almeida DO. Schedule an appointment as soon as possible for a visit in 1 week.    Specialty:  OB/Gyn    Why:  For close follow up    Contact information:    [address]  [facility] [address]  [phone]          Discharge Instructions         Urinary Tract Infections in Women    Urinary tract infections (UTIs) are most often caused by bacteria (germs). These bacteria enter the urinary tract. The bacteria may come from outside the body. Or they may travel from the skin outside the rectum or vagina into the urethra. Female anatomy makes it easy for bacteria from the bowel to enter a woman s urinary tract, which is the most common source of UTI. This means women develop UTIs more often than men. Pain in or around the urinary tract is a common UTI symptom. But the only way to know for sure if you have a UTI for the healthcare provider to test your urine. The two tests that may be done are the urinalysis and urine culture.  Types of UTIs    Cystitis: A bladder infection (cystitis) is the most common UTI in women. You may have urgent or frequent urination. You may also have pain, burning when you urinate, and bloody urine.    Urethritis: This is an inflamed urethra, which is the tube that carries urine from the bladder to outside the body. You may have lower stomach or back pain. You may also have urgent or frequent urination.    Pyelonephritis: This is a kidney  infection. If not treated, it can be serious and damage your kidneys. In severe cases, you may be hospitalized. You may have a fever and lower back pain.  Medicines to treat a UTI  Most UTIs are treated with antibiotics. These kill the bacteria. The length of time you need to take them depends on the type of infection. It may be as short as 3 days. If you have repeated UTIs, a low-dose antibiotic may be needed for several months. Take antibiotics exactly as directed. Don t stop taking them until all of the medicine is gone. If you stop taking the antibiotic too soon, the infection may not go away, and you may develop a resistance to the antibiotic. This can make it much harder to treat.  Lifestyle changes to treat and prevent UTIs  The lifestyle changes below will help get rid of your UTI. They may also help prevent future UTIs.    Drink plenty of fluids. This includes water, juice, or other caffeine-free drinks. Fluids help flush bacteria out of your body.    Empty your bladder. Always empty your bladder when you feel the urge to urinate. And always urinate before going to sleep. Urine that stays in your bladder can lead to infection. Try to urinate before and after sex as well.    Practice good personal hygiene. Wipe yourself from front to back after using the toilet. This helps keep bacteria from getting into the urethra.    Use condoms during sex. These help prevent UTIs caused by sexually transmitted bacteria. Also, avoid using spermicides during sex. These can increase the risk of UTIs. Choose other forms of birth control instead. For women who tend to get UTIs after sex, a low-dose of a preventive antibiotic may be used. Be sure to discuss this option with your healthcare provider.    Follow up with your healthcare provider as directed. He or she may test to make sure the infection has cleared. If needed, more treatment may be started.  Date Last Reviewed: 1/1/2017 2000-2017 [employer] [address]  [place] [address]. All rights reserved. This information is not intended as a substitute for professional medical care. Always follow your healthcare professional's instructions.          24 Hour Appointment Hotline       To make an appointment at any [facility] clinic, call [phone] ([phone]). If you don't have a family doctor or clinic, we will help you find one. [facility] clinics are conveniently located to serve the needs of you and your family.             Review of your medicines      START taking        Dose / Directions Last dose taken    amoxicillin 500 MG capsule   Commonly known as:  AMOXIL   Dose:  500 mg   Indication:  Urinary Tract Infection   Quantity:  14 capsule        Take 1 capsule (500 mg) by mouth 2 times daily for 7 days   Refills:  0          Our records show that you are taking the medicines listed below. If these are incorrect, please call your family doctor or clinic.        Dose / Directions Last dose taken    breast pump Misc   Dose:  1 each   Quantity:  1 each        1 each daily   Refills:  3        calcium carbonate 500 MG chewable tablet   Commonly known as:  TUMS   Dose:  2 chew tab        Take 2 chew tab by mouth daily   Refills:  0        * docusate sodium 100 MG tablet   Commonly known as:  COLACE   Dose:  100 mg   Quantity:  60 tablet        Take 100 mg by mouth daily   Refills:  1        * docusate sodium 100 MG tablet   Commonly known as:  COLACE   Dose:  100 mg   Quantity:  60 tablet        Take 100 mg by mouth daily   Refills:  1        fluconazole 150 MG tablet   Commonly known as:  DIFLUCAN   Dose:  150 mg   Quantity:  4 tablet        Take 1 tablet (150 mg) by mouth every 3 days   Refills:  3        ibuprofen 800 MG tablet   Commonly known as:  ADVIL/MOTRIN   Dose:  800 mg   Quantity:  90 tablet        Take 1 tablet (800 mg) by mouth every 8 hours as needed for moderate pain   Refills:  1        Multi-vitamin Tabs tablet   Dose:  1 tablet         Take 1 tablet by mouth daily   Refills:  0        ondansetron 8 MG tablet   Commonly known as:  ZOFRAN   Dose:  8 mg   Quantity:  21 tablet        Take 1 tablet (8 mg) by mouth every 8 hours as needed for nausea   Refills:  3        prochlorperazine 10 MG tablet   Commonly known as:  COMPAZINE   Dose:  10 mg   Quantity:  40 tablet        Take 1 tablet (10 mg) by mouth every 6 hours as needed for nausea or vomiting   Refills:  1        ursodiol 300 MG capsule   Commonly known as:  ACTIGALL   Dose:  300 mg   Quantity:  60 capsule        Take 1 capsule (300 mg) by mouth 2 times daily   Refills:  0        * Notice:  This list has 2 medication(s) that are the same as other medications prescribed for you. Read the directions carefully, and ask your doctor or other care provider to review them with you.            Prescriptions were sent or printed at these locations (1 Prescription)                   Other Prescriptions                Printed at Department/Unit printer (1 of 1)         amoxicillin (AMOXIL) 500 MG capsule                Procedures and tests performed during your visit     CBC with platelets differential    Chest XR,  PA & LAT    Comprehensive metabolic panel    Lipase    UA reflex to Microscopic and Culture    Urine Culture Aerobic Bacterial      Orders Needing Specimen Collection     None      Pending Results     Date and Time Order Name Status Description    9/14/2017 1730 Urine Culture Aerobic Bacterial In process             Pending Culture Results     Date and Time Order Name Status Description    9/14/2017 1730 Urine Culture Aerobic Bacterial In process             Pending Results Instructions     If you had any lab results that were not finalized at the time of your Discharge, you can call the ED Lab Result RN at [phone]. You will be contacted by this team for any positive Lab results or changes in treatment. The nurses are available 7 days a week from 10A to 6:30P.  You can leave a message  24 hours per day and they will return your call.        Test Results From Your Hospital Stay        9/14/2017  6:00 PM      Component Results     Component Value Ref Range & Units Status    Color Urine Straw  Final    Appearance Urine Clear  Final    Glucose Urine Negative NEG^Negative mg/dL Final    Bilirubin Urine Negative NEG^Negative Final    Ketones Urine Negative NEG^Negative mg/dL Final    Specific Gravity Urine 1.004 1.003 - 1.035 Final    Blood Urine Large (A) NEG^Negative Final    pH Urine 7.0 5.0 - 7.0 pH Final    Protein Albumin Urine Negative NEG^Negative mg/dL Final    Urobilinogen mg/dL 0.0 0.0 - 2.0 mg/dL Final    Nitrite Urine Negative NEG^Negative Final    Leukocyte Esterase Urine Large (A) NEG^Negative Final    Source Midstream Urine  Final    RBC Urine 5 (H) 0 - 2 /HPF Final    WBC Urine 39 (H) 0 - 2 /HPF Final    Bacteria Urine Few (A) NEG^Negative /HPF Final    Squamous Epithelial /HPF Urine 1 0 - 1 /HPF Final    Mucous Urine Present (A) NEG^Negative /LPF Final         9/14/2017  7:35 PM      Narrative     CHEST TWO VIEWS  9/14/2017 7:27 PM     HISTORY: Postpartum fever.    COMPARISON: 10/15/2012.        Impression     IMPRESSION: Chest is negative and unchanged. Lungs clear.    ELDON CORDERO MD         9/14/2017  5:39 PM      Component Results     Component Value Ref Range & Units Status    WBC 19.6 (H) 4.0 - 11.0 10e9/L Final    RBC Count 3.84 3.8 - 5.2 10e12/L Final    Hemoglobin 12.1 11.7 - 15.7 g/dL Final    Hematocrit 36.0 35.0 - 47.0 % Final    MCV 94 78 - 100 fl Final    MCH 31.5 26.5 - 33.0 pg Final    MCHC 33.6 31.5 - 36.5 g/dL Final    RDW 14.1 10.0 - 15.0 % Final    Platelet Count 335 150 - 450 10e9/L Final    Diff Method Automated Method  Final    % Neutrophils 77.8 % Final    % Lymphocytes 12.1 % Final    % Monocytes 6.6 % Final    % Eosinophils 1.9 % Final    % Basophils 0.3 % Final    % Immature Granulocytes 1.3 % Final    Nucleated RBCs 0 0 /100 Final    Absolute Neutrophil  15.3 (H) 1.6 - 8.3 10e9/L Final    Absolute Lymphocytes 2.4 0.8 - 5.3 10e9/L Final    Absolute Monocytes 1.3 0.0 - 1.3 10e9/L Final    Absolute Eosinophils 0.4 0.0 - 0.7 10e9/L Final    Absolute Basophils 0.1 0.0 - 0.2 10e9/L Final    Abs Immature Granulocytes 0.3 0 - 0.4 10e9/L Final    Absolute Nucleated RBC 0.0  Final         9/14/2017  5:59 PM      Component Results     Component Value Ref Range & Units Status    Sodium 139 133 - 144 mmol/L Final    Potassium 3.8 3.4 - 5.3 mmol/L Final    Chloride 107 94 - 109 mmol/L Final    Carbon Dioxide 25 20 - 32 mmol/L Final    Anion Gap 7 3 - 14 mmol/L Final    Glucose 91 70 - 99 mg/dL Final    Urea Nitrogen 8 7 - 30 mg/dL Final    Creatinine 0.58 0.52 - 1.04 mg/dL Final    GFR Estimate >90 >60 mL/min/1.7m2 Final    Non  GFR Calc    GFR Estimate If Black >90 >60 mL/min/1.7m2 Final    African American GFR Calc    Calcium 8.7 8.5 - 10.1 mg/dL Final    Bilirubin Total 0.3 0.2 - 1.3 mg/dL Final    Albumin 2.0 (L) 3.4 - 5.0 g/dL Final    Protein Total 6.3 (L) 6.8 - 8.8 g/dL Final    Alkaline Phosphatase 168 (H) 40 - 150 U/L Final    ALT 92 (H) 0 - 50 U/L Final    AST 23 0 - 45 U/L Final         9/14/2017  5:59 PM      Component Results     Component Value Ref Range & Units Status    Lipase 101 73 - 393 U/L Final         9/14/2017  6:01 PM                Clinical Quality Measure: Blood Pressure Screening     Your blood pressure was checked while you were in the emergency department today. The last reading we obtained was  BP: 124/73 . Please read the guidelines below about what these numbers mean and what you should do about them.  If your systolic blood pressure (the top number) is less than 120 and your diastolic blood pressure (the bottom number) is less than 80, then your blood pressure is normal. There is nothing more that you need to do about it.  If your systolic blood pressure (the top number) is 120-139 or your diastolic blood pressure (the bottom  number) is 80-89, your blood pressure may be higher than it should be. You should have your blood pressure rechecked within a year by a primary care provider.  If your systolic blood pressure (the top number) is 140 or greater or your diastolic blood pressure (the bottom number) is 90 or greater, you may have high blood pressure. High blood pressure is treatable, but if left untreated over time it can put you at risk for heart attack, stroke, or kidney failure. You should have your blood pressure rechecked by a primary care provider within the next 4 weeks.  If your provider in the emergency department today gave you specific instructions to follow-up with your doctor or provider even sooner than that, you should follow that instruction and not wait for up to 4 weeks for your follow-up visit.        Thank you for choosing [facility]       Thank you for choosing [facility] for your care. Our goal is always to provide you with excellent care. Hearing back from our patients is one way we can continue to improve our services. Please take a few minutes to complete the written survey that you may receive in the mail after you visit with us. Thank you!        [employer] Information     [employer] gives you secure access to your electronic health record. If you see a primary care provider, you can also send messages to your care team and make appointments. If you have questions, please call your primary care clinic.  If you do not have a primary care provider, please call [phone] and they will assist you.        Care EveryWhere ID     This is your Care EveryWhere ID. This could be used by other organizations to access your [facility] medical records  [id number]        Equal Access to Services     CASSANDRA GILBERT : Carlos Hamilton, watravonda sae, qaybta kaalorly braxton. So [facility] [phone].    ATENCIÓN: Si habla español, tiene a lee disposición servicios gratuitos de  asistencia lingüística. Kellee al [phone].    We comply with applicable federal civil rights laws and [place] laws. We do not discriminate on the basis of race, color, national origin, age, disability sex, sexual orientation or gender identity.            After Visit Summary       This is your record. Keep this with you and show to your community pharmacist(s) and doctor(s) at your next visit.

## 2017-09-14 NOTE — PLAN OF CARE
Problem: Goal Outcome Summary  Goal: Goal Outcome Summary  Outcome: Adequate for Discharge Date Met:  09/14/17  Data: Vital signs within normal limits. Postpartum checks within normal limits - see flow record. Patient eating and drinking normally. Patient able to empty bladder independently and is up ambulating. No apparent signs of infection. perineum healing well. Patient performing self cares and is able to care for infant.  Action: Patient medicated during the shift for pain. See MAR. Patient reassessed within 1 hour after each medication and pain was improved - patient stated she was comfortable. Patient education done about Discharge instructions for self and baby. Reviewed follow up recommendations and medications for home use. Reviewed post partum depression scale and importance of use at home. Pt watched shaken baby syndrome and post partum depression video. Pt states understanding of all teaching and has no further questions.. See flow record.  Response: Positive attachment behaviors observed with infant. Support persons FOB present.   Plan: Anticipate discharge to home today.

## 2017-09-14 NOTE — ED NOTES
HPI: two days post partum. Onset of fever at 1345 today. Temp 101.8 PO. Pt took Ibuprofen and tylenol. After ibuprofen, temp 100.8 po.  Pt instructed to go to ED by PMD.

## 2017-09-14 NOTE — TELEPHONE ENCOUNTER
Hi, since she has 101.8 fever, I would like her to come back to the ER to be evaluated.     Dr. Vianca Almeida, DO    Obstetrics and Gynecology  [facility]

## 2017-09-14 NOTE — ED PROVIDER NOTES
History     Chief Complaint:  Post partum fever    HPI   Joann Patterson is a 32 year old female who presents with post partum fever.  The patient is post-partum day 2. The birth was a healthy vaginal birth that occurred less than 48 hours ago with two tears that were repaired in the hospital. Of note this was the patient's first child. She has had minimal abdominal pain and vaginal bleeding that is light in color and without any clots and has decreased in volume. Today, she arrived at home and took a nap and then noticed that she had some chills. She recorded a fever of 101.8 at 1345, took tylenol and ibuprofen, and then noted a fever of 100.8 at 1445. She called her OB who recommended that she present to the ED for evaluation. The patient denies having experienced any rash, chest pain, shortness of breath, cough, congestion, sore throat, dysuria, hematuria, or side or back pain. She denies N/V/D. The patient notes that she did have a miscarriage one year ago.    Allergies:  Seasonal allergies    Medications:    Colace  Ursodiol  Fluconazole  Compazine  Zofran    Past Medical History:    Anemia  Asthma  Chronic pain  Endometriosis  Other chronic pain  PONV    Past Surgical History:    Davinci pelvic procedure  Left nasal suction cauterization and blood vessel removal  GYN Surgery  [place] teeth extraction  Nexplanon    Family History:    Asthma  Allergies  Hypertension  Cardiovascualr    Social History:  The patient was accompanied to the ED by her .  Smoking Status: Former  Smokeless Tobacco: No  Alcohol Use: No   Marital Status:  Single [1]    Review of Systems   Constitutional: Positive for fever.   Respiratory: Negative for cough and shortness of breath.    Cardiovascular: Negative for chest pain.   Gastrointestinal: Positive for abdominal pain.   Genitourinary: Positive for vaginal bleeding. Negative for dysuria, frequency and hematuria.   Skin: Negative for rash.   Neurological: Negative for headaches.  "  All other systems reviewed and are negative.    Physical Exam   Vitals:  Patient Vitals for the past 24 hrs:   BP Temp Temp src Pulse Resp SpO2 Height   09/14/17 2004 - - - - 16 98 % -   09/14/17 1847 - 99.1  F (37.3  C) Oral - - - -   09/14/17 1700 124/73 - - - - 98 % -   09/14/17 1645 133/85 - - - - 96 % -   09/14/17 1630 126/81 - - - - 97 % -   09/14/17 1615 116/76 - - - - 92 % -   09/14/17 1600 119/83 - - - - 96 % -   09/14/17 1558 123/75 98.6  F (37  C) Oral 115 18 96 % 1.6 m (5' 3\")      Physical Exam  General: Nontoxic, well appearing, Resting comfortably  Head:  Scalp, face, and head appear normal  Eyes:  Pupils are equal, round, and reactive to light    No nystagmus    Conjunctivae non-injected and sclerae white  ENT:    The external nose is normal    Pinnae are normal    The oropharynx is normal, mucous membranes moist    Posterior pharynx clear without swelling, exudates or erythema    Uvula is in the midline  Neck:  Normal range of motion    There is no rigidity noted    Trachea is in the midline  CV:  Regular rate and rhythm     Normal S1/S2, no S3/S4    No murmur or rub  Resp:  Lungs are clear and equal bilaterally    There is no tachypnea    No increased work of breathing    No rales, wheezing, or rhonchi  GI:  Abdomen is soft, no rigidity or guarding    Uterus palpable, nontender. No abdominal tenderness.    No CVA tenderness    No distension, or mass    No rebound tenderness   :  Normal external female genitalia. Labia remain swollen with no overlying skin changes. Left labial laceration appears to be healing well without discharge, bleeding, focal swelling or erythema. Small perineal laceration also appears to be healing well without the above findings as well. Small amount of thin red-tinged lochia present. No luz vaginal bleeding.  MS:  Normal muscular tone    Symmetric motor strength    No lower extremity edema  Skin:  No rash or acute skin lesions noted  Neuro: Awake and alert    Speech " is normal and fluent    Moves all extremities spontaneously  Psych: Normal affect.  Appropriate interactions.    Emergency Department Course   Imaging:  Chest XR, PA & LAT:  IMPRESSION: Chest is negative and unchanged. Lungs clear.  Reading per radiology.     Laboratory:  Laboratory findings were communicated with the patient who voiced understanding of the findings.  UA: Blood: large, Leukocyte esterase: Large, RBC: 5(H), WBC: 39(H), Bacteria: few, Mucous: present  Urine culture: pending  CBC: WBC: 19.6(H), Neutrophil: 15.3(H)  o/w WNL. (HGB 12.1, )    CMP: Albumin: 2.0(L), Protein total: 6.3(L), Alkphos: 168(H), ALT: 92(H), o/w WNL (Creatinine 0.58)   Lipase: 101    Interventions:  2001 Amoxicillin 500 mg oral    Emergency Department Course:  Nursing notes and vitals reviewed.  I performed an exam of the patient as documented above.   IV was inserted and blood was drawn for laboratory testing, results above.   The patient provided a urine sample here in the emergency department. This was sent for laboratory testing, findings above.   The patient was sent for a XR while in the emergency department, results above.      1902 I spoke with Dr White, the patient's OB GYN    I discussed the treatment plan with the patient. They expressed understanding of this plan and consented to discharge. They will be discharged home with instructions for care and follow up. In addition, the patient will return to the emergency department if their symptoms persist, worsen, if new symptoms arise or if there is any concern.  All questions were answered.     I personally reviewed the laboratory results with the Patient and answered all related questions prior to discharge.    Impression & Plan      Medical Decision Making:  Joann Patterson is a 32 year old female post partum day 2 after uncomplicated vaginal delivery but does note that she had a perineal and left labial tear during delivery which were repaired who presents to  the emergency department today with post partum fever. She was asked to be evaluated in the ED by her OB GYN. Patient is well appearing now and has no appearance of sepsis. Abdominal exam is benign with no uterine tenderness. The vaginal tears appear to be healing well without any evidence of infection. There is no significant vaginal discharge or bleeding. No other focal findings on exam to suggest worsening infection. I will obtain a urinalysis, CBC, chemistry, and chest x ray to evaluate further possible sources of infection however patient remains very well appearing. Once the results of the workup are back I will discuss the case with the patient's OB GYN to determine further treatment plans.    I discussed the above findings with Dr. White and given that the patient appears to have a UTI and has no significant abdominal pain or vaginal bleeding/discharge no need for a pelvic ultrasound. Will treat with amoxicillin for UTI as it is safe for lactating patient. Pt was instructed to follow up with Dr. White early next week. Return precautions were discussed with patient. The patient's questions were answered and the patient was agreeable with discharge.      Diagnosis:    ICD-10-CM    1. Postpartum urinary tract infection O86.20         Disposition:   Discharged    Discharge Medications:  Discharge Medication List as of 9/14/2017  7:50 PM      START taking these medications    Details   amoxicillin (AMOXIL) 500 MG capsule Take 1 capsule (500 mg) by mouth 2 times daily for 7 days, Disp-14 capsule, R-0, Local Print             Scribe Disclosure:  Shaun CROCKER, am serving as a scribe at 4:40 PM on 9/14/2017 to document services personally performed by Daniel Cronin MD, based on my observations and the provider's statements to me.   [facility] EMERGENCY DEPARTMENT       Daniel Cronin MD  09/15/17 3120

## 2017-09-14 NOTE — PLAN OF CARE
Problem: Goal Outcome Summary  Goal: Goal Outcome Summary  Outcome: Therapy, progress toward functional goals as expected  Vss, fundal checks wnl, voiding w/o difficulty, taking ibu and tyl for pain, using tucks, ice and heat for jeremy and back pain, breastfeeding well with latch of 9 seen, bonding well with infant, continue to monitor.

## 2017-09-14 NOTE — LACTATION NOTE
Lactation visit. Mom reports baby is Nw, has lost very little weight by day 2 and she is ready for d/c. Encouraged mom to call us PRN.

## 2017-09-14 NOTE — TELEPHONE ENCOUNTER
Pt calls today after delivering 9/12/17 vaginally.  Pt reports 101.8 fever.  Pt just took ibuprofen and tylenol.      Pt has back pain and cramping since Monday night.  Some dull HA.  No frequent or urgent urination, but pt obviously has discomfort.  Asked about pain and other various sx, nothing out of the ordinary for just delivering.    Advised to wait it out to see if meds help.  Told her we would cb to see that mneds helped and go from there.  Do you want her to do any labs or anything or continue to monitor?    Rosana Tucker R.N.  [facility]

## 2017-09-15 NOTE — DISCHARGE INSTRUCTIONS
Urinary Tract Infections in Women    Urinary tract infections (UTIs) are most often caused by bacteria (germs). These bacteria enter the urinary tract. The bacteria may come from outside the body. Or they may travel from the skin outside the rectum or vagina into the urethra. Female anatomy makes it easy for bacteria from the bowel to enter a woman s urinary tract, which is the most common source of UTI. This means women develop UTIs more often than men. Pain in or around the urinary tract is a common UTI symptom. But the only way to know for sure if you have a UTI for the healthcare provider to test your urine. The two tests that may be done are the urinalysis and urine culture.  Types of UTIs    Cystitis: A bladder infection (cystitis) is the most common UTI in women. You may have urgent or frequent urination. You may also have pain, burning when you urinate, and bloody urine.    Urethritis: This is an inflamed urethra, which is the tube that carries urine from the bladder to outside the body. You may have lower stomach or back pain. You may also have urgent or frequent urination.    Pyelonephritis: This is a kidney infection. If not treated, it can be serious and damage your kidneys. In severe cases, you may be hospitalized. You may have a fever and lower back pain.  Medicines to treat a UTI  Most UTIs are treated with antibiotics. These kill the bacteria. The length of time you need to take them depends on the type of infection. It may be as short as 3 days. If you have repeated UTIs, a low-dose antibiotic may be needed for several months. Take antibiotics exactly as directed. Don t stop taking them until all of the medicine is gone. If you stop taking the antibiotic too soon, the infection may not go away, and you may develop a resistance to the antibiotic. This can make it much harder to treat.  Lifestyle changes to treat and prevent UTIs  The lifestyle changes below will help get rid of your UTI. They may  also help prevent future UTIs.    Drink plenty of fluids. This includes water, juice, or other caffeine-free drinks. Fluids help flush bacteria out of your body.    Empty your bladder. Always empty your bladder when you feel the urge to urinate. And always urinate before going to sleep. Urine that stays in your bladder can lead to infection. Try to urinate before and after sex as well.    Practice good personal hygiene. Wipe yourself from front to back after using the toilet. This helps keep bacteria from getting into the urethra.    Use condoms during sex. These help prevent UTIs caused by sexually transmitted bacteria. Also, avoid using spermicides during sex. These can increase the risk of UTIs. Choose other forms of birth control instead. For women who tend to get UTIs after sex, a low-dose of a preventive antibiotic may be used. Be sure to discuss this option with your healthcare provider.    Follow up with your healthcare provider as directed. He or she may test to make sure the infection has cleared. If needed, more treatment may be started.  Date Last Reviewed: 1/1/2017 2000-2017 [employer]. [address] [place] [address]. All rights reserved. This information is not intended as a substitute for professional medical care. Always follow your healthcare professional's instructions.

## 2017-09-17 ENCOUNTER — TELEPHONE (OUTPATIENT)
Dept: EMERGENCY MEDICINE | Facility: CLINIC | Age: 32
End: 2017-09-17

## 2017-09-17 LAB
BACTERIA SPEC CULT: ABNORMAL
Lab: ABNORMAL
SPECIMEN SOURCE: ABNORMAL

## 2017-09-17 NOTE — TELEPHONE ENCOUNTER
[facility] Emergency Department Lab result notification [Adult-Female]    [facility] ED lab result protocol used  Urine Culture Protcol    Reason for call  Notify of lab results, assess symptoms,  review ED providers recommendations/discharge instructions (if necessary) and advise per ED lab result f/u protocol    Lab Result (including Rx patient on, if applicable)  Final Urine Culture Report on 09/17/2017  [facility] ED discharge antibiotic: Amoxicillin 500 mg PO capsule, 1 capsule (500 mg) by mouth 2 times daily for 7 days  #1. Bacteria, 10,000 to 50,000 colonies/mL Staphylococcus saprophyticus,  is RESISTANT to ED discharge antibiotic.   Change in treatment as per [facility] ED Lab result protocol.  Information table from ED Provider visit on 09/17/2017  ED diagnosis Postpartum urinary tract infection   ED provider Daniel Cronin MD   Symptoms reported at ED visit (Chief complaint, HPI) a 32 year old female who presents with post partum fever.  The patient is post-partum day 2. The birth was a healthy vaginal birth that occurred less than 48 hours ago with two tears that were repaired in the hospital. Of note this was the patient's first child. She has had minimal abdominal pain and vaginal bleeding that is light in color and without any clots and has decreased in volume. Today, she arrived at home and took a nap and then noticed that she had some chills. She recorded a fever of 101.8 at 1345, took tylenol and ibuprofen, and then noted a fever of 100.8 at 1445. She called her OB who recommended that she present to the ED for evaluation. The patient denies having experienced any rash, chest pain, shortness of breath, cough, congestion, sore throat, dysuria, hematuria, or side or back pain. She denies N/V/D. The patient notes that she did have a miscarriage one year ago.   ED providers Impression and Plan (applicable information) a 32 year old female post partum day 2 after uncomplicated vaginal delivery but does  note that she had a perineal and left labial tear during delivery which were repaired who presents to the emergency department today with post partum fever. She was asked to be evaluated in the ED by her OB GYN. Patient is well appearing now and has no appearance of sepsis. Abdominal exam is benign with no uterine tenderness. The vaginal tears appear to be healing well without any evidence of infection. There is no significant vaginal discharge or bleeding. No other focal findings on exam to suggest worsening infection. I will obtain a urinalysis, CBC, chemistry, and chest x ray to evaluate further possible sources of infection however patient remains very well appearing. Once the results of the workup are back I will discuss the case with the patient's OB GYN to determine further treatment plans.     I discussed the above findings with Dr. White and given that the patient appears to have a UTI and has no significant abdominal pain or vaginal bleeding/discharge no need for a pelvic ultrasound. Will treat with amoxicillin for UTI as it is safe for lactating patient. Pt was instructed to follow up with Dr. White early next week. Return precautions were discussed with patient. The patient's questions were answered and the patient was agreeable with discharge.   Significant Medical hx, if applicable Reviewed   Coumadin/Warfarin [Yes /No] no   Creatinine Level (mg/dl) 0.58   Creatinine clearance (ml/min), if applicable 183   Pregnant (Yes/No/NA) No   Breastfeeding (Yes/No/NA) Yes   Allergies Seasonal Allergies   Weight, if applicable 83.6      RN Assessment (Patient s current Symptoms), include time called.  [Insert Left message here if message left]  At 1331 Left voicemail message requesting a call back to [phone] between 10 a.m. and 6:30 p.m., 7 days a week for patient's ED/UC lab results.  May leave a message 24/7, if no one available.       Thelma Alvarez, RN  [employer] RN  Lung Nodule and  ED Lab Result F/u RN  Epic pool (ED late result f/u RN): [id number]  FV INCIDENTAL RADIOLOGY F/U NURSES: P [id number]  # [phone]    Copy of Lab result   Exam Information   Exam Date Exam Time Accession # Results    9/14/17  5:30 PM [id number]    Component Results   Component Collected Lab   Specimen Description 09/14/2017  5:30    Midstream Urine   Special Requests 09/14/2017  5:30 PM 75   Specimen received in preservative   Culture Micro (Abnormal) 09/14/2017  5:30    10,000 to 50,000 colonies/mL   Staphylococcus saprophyticus      Culture & Susceptibility   STAPHYLOCOCCUS SAPROPHYTICUS   Antibiotic Interpretation Sensitivity Unit Method Status   CIPROFLOXACIN Sensitive <=0.5 ug/mL ROSEY Final   GENTAMICIN Sensitive <=0.5 ug/mL ROSEY Final   LEVOFLOXACIN Sensitive 0.5 ug/mL ROSEY Final   NITROFURANTOIN Sensitive <=16 ug/mL ROSEY Final   OXACILLIN Resistant >=4 ug/mL ROSEY Final   PENICILLIN Resistant >=0.5 ug/mL ROSEY Final   TETRACYCLINE Sensitive <=1 ug/mL ROSEY Final   VANCOMYCIN Sensitive 1 ug/mL ROSEY Final

## 2017-09-18 RX ORDER — NITROFURANTOIN 25; 75 MG/1; MG/1
100 CAPSULE ORAL 2 TIMES DAILY
Qty: 14 CAPSULE | Refills: 0 | Status: SHIPPED | OUTPATIENT
Start: 2017-09-18 | End: 2017-09-25

## 2017-09-18 NOTE — TELEPHONE ENCOUNTER
At 1017 pt states she has burning but in the area of a laceration so unsure if related to UTI or not, denies fever.  No new symtpoms    RN recommended stopping the amoxicillin, start Macrobid  Please Contact your PCP clinic or return to the Emergency department if your:    Symptoms return.    Symptoms worsen or other concerning symptom's.  Thelma Alvarez, RN  [facility] RN  Lung Nodule and ED Lab Result F/u RN  Epic pool (ED late result f/u RN): [id number]  FV INCIDENTAL RADIOLOGY F/U NURSES: P [id number]  Ph# [phone]

## 2017-09-20 ENCOUNTER — OFFICE VISIT (OUTPATIENT)
Dept: OBGYN | Facility: CLINIC | Age: 32
End: 2017-09-20
Payer: COMMERCIAL

## 2017-09-20 VITALS
DIASTOLIC BLOOD PRESSURE: 68 MMHG | HEIGHT: 63 IN | BODY MASS INDEX: 29.96 KG/M2 | WEIGHT: 169.1 LBS | SYSTOLIC BLOOD PRESSURE: 92 MMHG | HEART RATE: 98 BPM

## 2017-09-20 PROCEDURE — 99213 OFFICE O/P EST LOW 20 MIN: CPT | Mod: 24 | Performed by: OBSTETRICS & GYNECOLOGY

## 2017-09-20 NOTE — MR AVS SNAPSHOT
After Visit Summary   9/20/2017    Joann Patterson    MRN: [id number]           Patient Information     Date Of Birth          1985        Visit Information        Provider Department      9/20/2017 10:45 AM Rhys Hagan MD [facility]        Today's Diagnoses     Injury from birth trauma    -  1       Follow-ups after your visit        Your next 10 appointments already scheduled     Oct 25, 2017  1:00 PM CDT   Post Partum with Vianca Almeida,    [facility]    [address]  [facility] [id number] [phone]              Who to contact     If you have questions or need follow up information about today's clinic visit or your schedule please contact [facility] directly at [phone].  Normal or non-critical lab and imaging results will be communicated to you by MyChart, letter or phone within 4 business days after the clinic has received the results. If you do not hear from us within 7 days, please contact the clinic through MyChart or phone. If you have a critical or abnormal lab result, we will notify you by phone as soon as possible.  Submit refill requests through [employer] or call your pharmacy and they will forward the refill request to us. Please allow 3 business days for your refill to be completed.          Additional Information About Your Visit        MyChart Information     [employer] gives you secure access to your electronic health record. If you see a primary care provider, you can also send messages to your care team and make appointments. If you have questions, please call your primary care clinic.  If you do not have a primary care provider, please call [phone] and they will assist you.        Care EveryWhere ID     This is your Care EveryWhere ID. This could be used by other organizations to access your [facility] medical records  [id number]        Your Vitals Were      "Pulse Height Last Period BMI (Body Mass Index)          98 5' 3\" (1.6 m) 12/14/2016 (Exact Date) 29.95 kg/m2         Blood Pressure from Last 3 Encounters:   09/20/17 92/68   09/14/17 124/73   09/14/17 127/77    Weight from Last 3 Encounters:   09/20/17 169 lb 1.6 oz (76.7 kg)   09/11/17 184 lb (83.5 kg)   09/06/17 184 lb 6.4 oz (83.6 kg)              Today, you had the following     No orders found for display         Today's Medication Changes          These changes are accurate as of: 9/20/17 11:59 PM.  If you have any questions, ask your nurse or doctor.               These medicines have changed or have updated prescriptions.        Dose/Directions    docusate sodium 100 MG tablet   Commonly known as:  COLACE   This may have changed:  Another medication with the same name was removed. Continue taking this medication, and follow the directions you see here.   Used for:  S/P laparoscopy        Dose:  100 mg   Take 100 mg by mouth daily   Quantity:  60 tablet   Refills:  1         Stop taking these medicines if you haven't already. Please contact your care team if you have questions.     amoxicillin 500 MG capsule   Commonly known as:  AMOXIL   Stopped by:  Rhys Hagan MD           calcium carbonate 500 MG chewable tablet   Commonly known as:  TUMS   Stopped by:  Rhys Hagan MD           ondansetron 8 MG tablet   Commonly known as:  ZOFRAN   Stopped by:  Rhys Hagan MD           prochlorperazine 10 MG tablet   Commonly known as:  COMPAZINE   Stopped by:  Rhys Hagan MD           ursodiol 300 MG capsule   Commonly known as:  ACTIGALL   Stopped by:  Rhys Hagan MD                    Primary Care Provider Office Phone # Fax #    Vianca Odalis DO Juan Pablo [phone]       [address] [facility] [address]        Equal Access to Services     CASSANDRA GILBERT AH: Hadii jonathan rojas hadasho Soomaali, waaxda luqadaha, qaybta kaalmada adeegyada, orly pete " samira ennis ah. So [facility] [phone].    ATENCIÓN: Si kathyla arik, tiene a lee disposición servicios gratuitos de asistencia lingüística. Kellee stoddard [phone].    We comply with applicable federal civil rights laws and [place] laws. We do not discriminate on the basis of race, color, national origin, age, disability, sex, sexual orientation, or gender identity.            Thank you!     Thank you for choosing [facility]  for your care. Our goal is always to provide you with excellent care. Hearing back from our patients is one way we can continue to improve our services. Please take a few minutes to complete the written survey that you may receive in the mail after your visit with us. Thank you!             Your Updated Medication List - Protect others around you: Learn how to safely use, store and throw away your medicines at www.disposemymeds.org.          This list is accurate as of: 17 11:59 PM.  Always use your most recent med list.                   Brand Name Dispense Instructions for use Diagnosis    breast pump Misc     1 each    1 each daily    Prenatal care in third trimester, Postpartum care and examination of lactating mother       docusate sodium 100 MG tablet    COLACE    60 tablet    Take 100 mg by mouth daily    S/P laparoscopy       fluconazole 150 MG tablet    DIFLUCAN    4 tablet    Take 1 tablet (150 mg) by mouth every 3 days    Candidiasis of vulva and vagina       ibuprofen 800 MG tablet    ADVIL/MOTRIN    90 tablet    Take 1 tablet (800 mg) by mouth every 8 hours as needed for moderate pain     (spontaneous vaginal delivery)       Multi-vitamin Tabs tablet      Take 1 tablet by mouth daily        nitroFURantoin (macrocrystal-monohydrate) 100 MG capsule    MACROBID    14 capsule    Take 1 capsule (100 mg) by mouth 2 times daily for 7 days

## 2017-09-20 NOTE — PROGRESS NOTES
"Joann Patterson is a 32 year old female who comes in today with complaints of urinary problems.  The symptoms began 4 days ago.  Patient's history of UTI frequent  Additional risk factures include recent delivery  Patient's symptoms include dysuria and frequency  Treatments tried: recent abx at urgent care. Culture reviewed and negative    ROS:  C: NEGATIVE for fever, chills  E: NEGATIVE for vision changes   R: NEGATIVE for significant cough or SOB  CV: NEGATIVE for chest pain, palpitations   GI: NEGATIVE for nausea, abdominal pain, heartburn, or change in bowel habits  : NEGATIVE for frequency, dysuria, or hematuria  M: NEGATIVE for significant arthralgias or myalgia  N: NEGATIVE for weakness, dizziness or paresthesias or headache    OBJECTIVE:    EXAM:  BP 92/68  Pulse 98  Ht 5' 3\" (1.6 m)  Wt 169 lb 1.6 oz (76.7 kg)  LMP 12/14/2016 (Exact Date)  BMI 29.95 kg/m2   GENERAL APPEARANCE: healthy, alert and no distress  EYES: EOMI,  PERRL  HENT: ear canals and TM's normal and nose and mouth without ulcers or lesions  ABDOMEN:  soft, nontender, no HSM or masses and bowel sounds normal  GU_female: normal cervix, adnexae, and uterus without masses or discharge and small tears near introitus from birth trauma  MS: extremities normal- no gross deformities noted, no evidence of inflammation in joints, FROM in all extremities.      PLAN:  Recent birth trauma. Sitz baths      "

## 2017-09-20 NOTE — NURSING NOTE
"Chief Complaint   Patient presents with     Hospital F/U     UTI at hospital       Initial BP 92/68  Pulse 98  Ht 5' 3\" (1.6 m)  Wt 169 lb 1.6 oz (76.7 kg)  LMP 2016 (Exact Date)  BMI 29.95 kg/m2 Estimated body mass index is 29.95 kg/(m^2) as calculated from the following:    Height as of this encounter: 5' 3\" (1.6 m).    Weight as of this encounter: 169 lb 1.6 oz (76.7 kg).  BP completed using cuff size: regular        The following HM Due: NONE      The following patient reported/Care Every where data was sent to:  P ABSTRACT QUALITY INITIATIVES [03464]  NA     patient has appointment for today    Romina MACIEL               "

## 2017-10-23 ENCOUNTER — TELEPHONE (OUTPATIENT)
Dept: PEDIATRICS | Facility: CLINIC | Age: 32
End: 2017-10-23

## 2017-10-23 ENCOUNTER — OFFICE VISIT (OUTPATIENT)
Dept: URGENT CARE | Facility: URGENT CARE | Age: 32
End: 2017-10-23
Payer: COMMERCIAL

## 2017-10-23 VITALS
WEIGHT: 167.6 LBS | BODY MASS INDEX: 29.69 KG/M2 | HEART RATE: 101 BPM | TEMPERATURE: 98 F | DIASTOLIC BLOOD PRESSURE: 62 MMHG | SYSTOLIC BLOOD PRESSURE: 104 MMHG | OXYGEN SATURATION: 98 %

## 2017-10-23 DIAGNOSIS — N61.0 NIPPLE INFECTION: Primary | ICD-10-CM

## 2017-10-23 PROCEDURE — 99213 OFFICE O/P EST LOW 20 MIN: CPT | Performed by: FAMILY MEDICINE

## 2017-10-23 RX ORDER — NYSTATIN 100000/ML
500000 SUSPENSION, ORAL (FINAL DOSE FORM) ORAL 4 TIMES DAILY
Qty: 60 ML | Refills: 0 | Status: SHIPPED | OUTPATIENT
Start: 2017-10-23 | End: 2020-07-08

## 2017-10-23 NOTE — TELEPHONE ENCOUNTER
Patient is breast feeding 5 week old and believes they both have thrush. States child has white patches that will not wipe away in mouth and she has irritated nipples. Recommended urgent care appointment.   Celeste Amezquita RN

## 2017-10-23 NOTE — PROGRESS NOTES
SUBJECTIVE:  Joann Patterson, a 32 year old female scheduled an appointment to discuss the following issues:  Nipple infection; she is having complaints of a burning type of sensation around her nipples and breasts. She is concerned that she has a thrush type infection. Her infant indeed does have such and is being treated.    She has no fever no chills her breasts are without significant redness masses    Medical, social, surgical, and family histories reviewed.    ROS:  C: NEGATIVE for fever, chills  E: NEGATIVE for vision changes   R: NEGATIVE for significant cough or SOB  BREAST: As described above  CV: NEGATIVE for chest pain, palpitations   GI: NEGATIVE for nausea, abdominal pain, heartburn, or change in bowel habits  : NEGATIVE for frequency, dysuria, or hematuria  M: NEGATIVE for significant arthralgias or myalgia  N: NEGATIVE for weakness, dizziness or paresthesias or headache    OBJECTIVE:  /62 (BP Location: Right arm, Patient Position: Chair, Cuff Size: Adult Regular)  Pulse 101  Temp 98  F (36.7  C) (Tympanic)  Wt 167 lb 9.6 oz (76 kg)  LMP 12/14/2016 (Exact Date)  SpO2 98%  BMI 29.69 kg/m2  EXAM:  GENERAL APPEARANCE: healthy, alert and no distress  EYES: EOMI,  PERRL  HENT: ear canals and TM's normal and nose and mouth without ulcers or lesions  RESP: lungs clear to auscultation - no rales, rhonchi or wheezes  CV: regular rates and rhythm, normal S1 S2, no S3 or S4 and no murmur, click or rub -  ABDOMEN:  soft, nontender, no HSM or masses and bowel sounds normal  MS: extremities normal- no gross deformities noted, no evidence of inflammation in joints, FROM in all extremities.    ASSESSMENT/PLAN:  (N61.0) Nipple infection  (primary encounter diagnosis)  Comment: 32-year-old female breast-feeding having bilateral nipple burning and irritation. Of significance is her infant has thrush. I suspect that this is probably the same irritants that is giving her the current problem.    I would treat  her with the medication listed below. She should use this to the area 4 times a day. Decided not to use a nystatin cream to the area thinking that this might be a slightly toxic to the child. If not toxic certainly irritable.   Plan: nystatin (MYCOSTATIN) 233339 UNIT/ML suspension          She should return to her primary physician within the next 2 weeks for follow-up

## 2017-10-23 NOTE — NURSING NOTE
"No chief complaint on file.      Initial /62 (BP Location: Right arm, Patient Position: Chair, Cuff Size: Adult Regular)  Pulse 101  Temp 98  F (36.7  C) (Tympanic)  Wt 167 lb 9.6 oz (76 kg)  LMP 12/14/2016 (Exact Date)  SpO2 98%  BMI 29.69 kg/m2 Estimated body mass index is 29.69 kg/(m^2) as calculated from the following:    Height as of 9/20/17: 5' 3\" (1.6 m).    Weight as of this encounter: 167 lb 9.6 oz (76 kg).  Medication Reconciliation: unable or not appropriate to perform   Arturo Wagoner CMA ([facility]) 10/23/2017 4:07 PM    "

## 2017-10-25 ENCOUNTER — PRENATAL OFFICE VISIT (OUTPATIENT)
Dept: OBGYN | Facility: CLINIC | Age: 32
End: 2017-10-25
Payer: COMMERCIAL

## 2017-10-25 VITALS
DIASTOLIC BLOOD PRESSURE: 60 MMHG | BODY MASS INDEX: 29.73 KG/M2 | HEIGHT: 63 IN | WEIGHT: 167.8 LBS | SYSTOLIC BLOOD PRESSURE: 106 MMHG

## 2017-10-25 LAB — HGB BLD-MCNC: 13.8 G/DL (ref 11.7–15.7)

## 2017-10-25 PROCEDURE — 99207 ZZC POST PARTUM EXAM: CPT | Performed by: FAMILY MEDICINE

## 2017-10-25 PROCEDURE — 36415 COLL VENOUS BLD VENIPUNCTURE: CPT | Performed by: FAMILY MEDICINE

## 2017-10-25 PROCEDURE — 85018 HEMOGLOBIN: CPT | Performed by: FAMILY MEDICINE

## 2017-10-25 ASSESSMENT — PATIENT HEALTH QUESTIONNAIRE - PHQ9: SUM OF ALL RESPONSES TO PHQ QUESTIONS 1-9: 0

## 2017-10-25 NOTE — NURSING NOTE
"Chief Complaint   Patient presents with     Post Partum Exam     vaginal delivery 9/12/17--pap not due--does not desire contraception--having some vaginal discharge       Initial /60  Ht 5' 3\" (1.6 m)  Wt 167 lb 12.8 oz (76.1 kg)  LMP 12/14/2016 (Exact Date)  BMI 29.72 kg/m2 Estimated body mass index is 29.72 kg/(m^2) as calculated from the following:    Height as of this encounter: 5' 3\" (1.6 m).    Weight as of this encounter: 167 lb 12.8 oz (76.1 kg).  Medication Reconciliation: complete   Lulu Gonzalez CMA      "

## 2017-10-25 NOTE — PROGRESS NOTES
"SUBJECTIVE: Joann is here for a 6-week postpartum checkup.    Delivery date was 17. She had a  of a viable boy, weight 6 pounds 10 oz., with no complications.  Since delivery, she has been breast feeding.  She has no signs of infection, bleeding or other complications.  She is not pregnant.  We discussed contraceptions and she has chosen none.  She  has not had intercourse since delivery and complains of No discomfort. Patient screened for postpartum depression and complaints are NEGATIVE. Screening has also been completed for intimate partner violence.    Pt is doing really well, states she is \"happy tired\". She is  in the breast area, due to thrush, but has begun treatment. She is not currently planning for another pregnancy, but is not ruling it out for the future.     This document serves as a record of the services and decisions personally performed and made by Vianca Almeida DO. It was created on her behalf by Joanne Aldrich, a trained medical scribe. The creation of this document is based the provider's statements to the medical scribe.  Scribe Joanne Aldrich 1:05 PM, 2017    EXAM:  Today's Depression Rating was 0  GENERAL healthy, alert and no distress  EYES EOMI, intact visual fields, PERRL and funduscopic deferred  HENT: Normocephalic. TM's grossly normal, oropharynx without significant findings.  NECK: no adenopathy, no asymmetry, masses, or scars and thyroid normal to palpation  RESP: Clear to auscultation  BREASTS: NEGATIVE  CV: NEGATIVE  LYMPH: NEGATIVE  GI: aorta normal and bowel sounds normal  : no hernia detected  GYN PELVIC: NEGATIVE, normal external genitalia, normal groin lymphatics, normal urethral meatus, normal vaginal mucosa, normal cervix and normal adnexa, no masses or tenderness  MS: NEGATIVE, Extremities normal. No deformaties, edema or skin discoloration.  SKIN: no ulcers, lesions or rash  NEURO: alert/oriented to person, location and time, CN 2-12 intact, " brisk, symmetric reflexes at knees and biceps b/l, strength 5/5 throughout and symmetric, sensation to light touch grossly intact throughout  PSYCH: NEGATIVE    ASSESSMENT:   Normal postpartum exam after .    PLAN:  1) Pt desires no contraception, will maintain use of condom with .  2) Hemoglobin labs pending  3) Return as needed or at time of next expected pap, pelvic, or breast exam.      The information in this document, created by the medical scribe for me, accurately reflects the services I personally performed and the decisions made by me. I have reviewed and approved this document for accuracy prior to leaving the patient care area.  1:05 PM, 10/25/17    Juan Pablo

## 2017-10-25 NOTE — PATIENT INSTRUCTIONS
Return yearly       Dr. Vianca Almeida, DO    Obstetrics and Gynecology  [facility]

## 2017-10-25 NOTE — MR AVS SNAPSHOT
"              After Visit Summary   10/25/2017    Joann Patterson    MRN: [id number]           Patient Information     Date Of Birth          1985        Visit Information        Provider Department      10/25/2017 1:00 PM Vianca Almeida, DO [facility]        Today's Diagnoses     Routine postpartum follow-up    -  1      Care Instructions    Return yearly       Dr. Vianca Almeida, DO    Obstetrics and Gynecology  [facility]                 Follow-ups after your visit        Who to contact     If you have questions or need follow up information about today's clinic visit or your schedule please contact [facility] directly at [phone].  Normal or non-critical lab and imaging results will be communicated to you by MyChart, letter or phone within 4 business days after the clinic has received the results. If you do not hear from us within 7 days, please contact the clinic through Bromiumhart or phone. If you have a critical or abnormal lab result, we will notify you by phone as soon as possible.  Submit refill requests through Engiver or call your pharmacy and they will forward the refill request to us. Please allow 3 business days for your refill to be completed.          Additional Information About Your Visit        MyChart Information     Engiver gives you secure access to your electronic health record. If you see a primary care provider, you can also send messages to your care team and make appointments. If you have questions, please call your primary care clinic.  If you do not have a primary care provider, please call [phone] and they will assist you.        Care EveryWhere ID     This is your Care EveryWhere ID. This could be used by other organizations to access your [place] medical records  [id number]        Your Vitals Were     Height Last Period BMI (Body Mass Index)             5' 3\" (1.6 m) 12/14/2016 (Exact Date) 29.72 " kg/m2          Blood Pressure from Last 3 Encounters:   10/25/17 106/60   10/23/17 104/62   09/20/17 92/68    Weight from Last 3 Encounters:   10/25/17 167 lb 12.8 oz (76.1 kg)   10/23/17 167 lb 9.6 oz (76 kg)   09/20/17 169 lb 1.6 oz (76.7 kg)              We Performed the Following     Hemoglobin        Primary Care Provider Office Phone # Fax #    Vianca Almeida,  [phone]       [address] [facility] [address]        Equal Access to Services     [facility] : Hadii aad ku hadasho Soomaali, waaxda luqadaha, qaybta kaalmada ademadeleineyada, orly ennis . So [facility] [phone].    ATENCIÓN: Si habla español, tiene a lee disposición servicios gratuitos de asistencia lingüística. Llame al [phone].    We comply with applicable federal civil rights laws and [place] laws. We do not discriminate on the basis of race, color, national origin, age, disability, sex, sexual orientation, or gender identity.            Thank you!     Thank you for choosing [facility]  for your care. Our goal is always to provide you with excellent care. Hearing back from our patients is one way we can continue to improve our services. Please take a few minutes to complete the written survey that you may receive in the mail after your visit with us. Thank you!             Your Updated Medication List - Protect others around you: Learn how to safely use, store and throw away your medicines at www.disposemymeds.org.          This list is accurate as of: 10/25/17  1:15 PM.  Always use your most recent med list.                   Brand Name Dispense Instructions for use Diagnosis    breast pump Misc     1 each    1 each daily    Prenatal care in third trimester, Postpartum care and examination of lactating mother       docusate sodium 100 MG tablet    COLACE    60 tablet    Take 100 mg by mouth daily    S/P laparoscopy       * fluconazole 150 MG tablet    DIFLUCAN    4  tablet    Take 1 tablet (150 mg) by mouth every 3 days    Candidiasis of vulva and vagina       * fluconazole 200 MG tablet    DIFLUCAN    15 tablet    Take 1 tablet (200 mg) by mouth See Admin Instructions for 15 days    Yeast infection of nipple, postpartum       ibuprofen 800 MG tablet    ADVIL/MOTRIN    90 tablet    Take 1 tablet (800 mg) by mouth every 8 hours as needed for moderate pain     (spontaneous vaginal delivery)       Multi-vitamin Tabs tablet      Take 1 tablet by mouth daily        nystatin 616727 UNIT/ML suspension    MYCOSTATIN    60 mL    Take 5 mLs (500,000 Units) by mouth 4 times daily    Nipple infection       * Notice:  This list has 2 medication(s) that are the same as other medications prescribed for you. Read the directions carefully, and ask your doctor or other care provider to review them with you.

## 2017-11-01 ENCOUNTER — TELEPHONE (OUTPATIENT)
Dept: OBGYN | Facility: CLINIC | Age: 32
End: 2017-11-01

## 2017-11-01 DIAGNOSIS — O92.29 POSTPARTUM NIPPLE PAIN: Primary | ICD-10-CM

## 2017-11-01 NOTE — TELEPHONE ENCOUNTER
Pt is breast feeding and per recommendations from a lactation consultant, pt should get an rx for apno cream.     Pt is still using the diflucan, but the symptoms have not improved.     RADHA Castellon RN

## 2017-11-01 NOTE — TELEPHONE ENCOUNTER
Mupirocin 2% ointment 15 gm   Betamethasone 0.1% 15 gm   Add miconazole powder to a concentration of 2%      Apply sparingly to nipple after feeding     Above rx called in   Dr. Vianca Almeida,     Obstetrics and Gynecology  [facility] [place]

## 2017-11-28 LAB
BILE AC SERPL-SCNC: 1.5 UMOL/L
CDCAE SERPL-SCNC: 4.9 UMOL/L (ref 0–3.4)
CHOLATE SERPL-SCNC: 24.2 UMOL/L
DO-CHOLATE SERPL-SCNC: 17.5 UMOL/L
URSODEOXYCHOLATE SERPL-SCNC: 0.3 UMOL/L (ref 0–1)

## 2018-01-02 ENCOUNTER — NURSE TRIAGE (OUTPATIENT)
Dept: NURSING | Facility: CLINIC | Age: 33
End: 2018-01-02

## 2018-01-02 ENCOUNTER — TELEPHONE (OUTPATIENT)
Dept: OBGYN | Facility: CLINIC | Age: 33
End: 2018-01-02

## 2018-01-02 NOTE — TELEPHONE ENCOUNTER
Patient calling stating she has a plugged milk duct that has been bothering her since last night. She has tried pumping, warm compresses, hand expression, warm shower, and epsom salts. She is taking ibuprofen. She does not have a fever, but is worried about getting an infection if the duct does not clear. Put her on Dr. Miller's schedule today.      Toña Perez RN

## 2018-01-02 NOTE — TELEPHONE ENCOUNTER
Reason for Disposition    [1] SEVERE pain AND [2] not improved after 2 hours of pain medicine and Care Advice    Additional Information    Negative: Sounds like a life-threatening emergency to the triager    Negative: Patient plans to continue breastfeeding    Negative: [1] SEVERE breast pain AND [2] fever > 103 F (39.4 C)    Negative: Patient sounds very sick or weak to the triager    Negative: [1] Breast looks infected (spreading redness, feels hot to touch) AND [2] large red area (> 2 in. or 5 cm)    Negative: Fever > 100.4 F (38.0 C)    Protocols used: POSTPARTUM - BREAST PAIN AND ENGORGEMENT-ADULT-AH  Mother is breastfeeding and states she has a clogged breast. Patient denies any redness or fever, only pain and she can feel the blockage. Triager advised mother to call back when the clinic opens up to speak with provider regarding this issue.

## 2018-08-03 ENCOUNTER — OFFICE VISIT (OUTPATIENT)
Dept: URGENT CARE | Facility: URGENT CARE | Age: 33
End: 2018-08-03
Payer: COMMERCIAL

## 2018-08-03 VITALS
DIASTOLIC BLOOD PRESSURE: 60 MMHG | HEART RATE: 91 BPM | TEMPERATURE: 98.4 F | OXYGEN SATURATION: 96 % | SYSTOLIC BLOOD PRESSURE: 104 MMHG

## 2018-08-03 DIAGNOSIS — B02.9 HERPES ZOSTER WITHOUT COMPLICATION: Primary | ICD-10-CM

## 2018-08-03 PROCEDURE — 99213 OFFICE O/P EST LOW 20 MIN: CPT | Performed by: FAMILY MEDICINE

## 2018-08-03 RX ORDER — VALACYCLOVIR HYDROCHLORIDE 1 G/1
1000 TABLET, FILM COATED ORAL 3 TIMES DAILY
Qty: 21 TABLET | Refills: 0 | Status: SHIPPED | OUTPATIENT
Start: 2018-08-03 | End: 2020-07-08

## 2018-08-03 NOTE — PROGRESS NOTES
SUBJECTIVE:  Joann Patterson is a 33 year old female who presents to the clinic today for a worsening painful, blistering rash on the right buttock.  Onset of rash was one week ago.  She has been experiencing severe job-related stress recently.   Rash is worsening..  Location of the rash: right buttock.     Symptoms are still present and rash seems to be worsening..  Previous history of a similar rash? no  Recent exposure history: none. .    Past Medical History:   Diagnosis Date     Anemia     2016     Asthma     No longer uses inhaler     Chronic pain     Abdominal pain with mensturation.     Endometriosis 2008     Other chronic pain     endometriosis     PONV (postoperative nausea and vomiting)      Current Outpatient Prescriptions   Medication Sig Dispense Refill     Fexofenadine HCl (ALLEGRA PO)        ibuprofen (ADVIL/MOTRIN) 800 MG tablet Take 1 tablet (800 mg) by mouth every 8 hours as needed for moderate pain 90 tablet 1     multivitamin, therapeutic with minerals (MULTI-VITAMIN) TABS tablet Take 1 tablet by mouth daily       APNO ointment CREA cream Apply 2 g topically every hour as needed for skin care (Patient not taking: Reported on 8/3/2018) 70 g 1     docusate sodium (COLACE) 100 MG tablet Take 100 mg by mouth daily (Patient not taking: Reported on 10/25/2017) 60 tablet 1     fluconazole (DIFLUCAN) 150 MG tablet Take 1 tablet (150 mg) by mouth every 3 days (Patient not taking: Reported on 6/14/2017) 4 tablet 3     Misc. Devices (BREAST PUMP) MISC 1 each daily (Patient not taking: Reported on 8/3/2018) 1 each 3     nystatin (MYCOSTATIN) 035625 UNIT/ML suspension Take 5 mLs (500,000 Units) by mouth 4 times daily (Patient not taking: Reported on 8/3/2018) 60 mL 0     Social History   Substance Use Topics     Smoking status: Former Smoker     Packs/day: 0.25     Years: 10.00     Types: Cigarettes     Quit date: 9/20/2012     Smokeless tobacco: Never Used     Alcohol use No       ROS:  Review of systems  negative except as stated above.    EXAM:   /60 (BP Location: Right arm, Patient Position: Chair, Cuff Size: Adult Large)  Pulse 91  Temp 98.4  F (36.9  C) (Tympanic)  SpO2 96%  GENERAL: alert, no acute distress.  SKIN: Rash description:    Distribution: Dermatomal distribution on the lateral aspect of the right buttock  Location: right buttock    Color: red,  Lesion type: vesicular with some crusting, blotchy with no other findings    ASSESSMENT:  Shingles on the Right Buttock    PLAN:  1) Rx:  Valacyclovir  2) Follow-up with primary clinic if not improving in 7 days  3) Tylenol, Ibuprofen    Dean Harrison MD

## 2018-08-03 NOTE — MR AVS SNAPSHOT
After Visit Summary   8/3/2018    Joann Patterson    MRN: [id number]           Patient Information     Date Of Birth          1985        Visit Information        Provider Department      8/3/2018 2:10 PM Dean Harrison MD [facility]        Today's Diagnoses     Herpes zoster without complication    -  1      Care Instructions    Tylenol, Ibuprofen for the pain    follow up with your primary care provider if not better in 7 days.        Shingles  Shingles is a viral infection caused by the same virus as chicken pox. Anyone who has had chicken pox may get shingles later in life. The virus stays in the body, but remains dormant (asleep). Shingles often occurs in older persons or persons with lowered immunity. But it can affect anyone at any age.  Shingles starts as a tingling patch of skin on one side of the body. Small, painful blisters may then appear. The rash does not spread to the rest of the body.  Exposure to shingles cannot cause shingles. However, it can cause chicken pox in anyone who has not had chicken pox or has not been vaccinated. The contagious period ends when all blisters have crusted over (generally about 2 weeks after the illness begins).  After the blisters heal, the affected skin may be sensitive or painful for months (neuralgia). This often gradually goes away.  A shingles vaccine is available. This can help prevent shingles or make it less painful. It is generally recommended for adults over the age of 60 who have had chicken pox in the past, but who have never had shingles. Adults over 60 who have had neither chicken pox nor shingles can prevent both diseases with the chicken pox vaccine. Ask your healthcare provider about these vaccines.  Home care    Medicines may be prescribed to help relieve pain. Take these medicines as directed. Ask your healthcare provider or pharmacist before using over-the-counter medicines for helping treat pain and itching.    In  certain cases, antiviral medicines may be prescribed to reduce pain, shorten the illness, and prevent neuralgia. Take these medicines as directed.    Compresses made from a solution of cool water mixed with cornstarch or baking soda may help relieve pain and itching.     Gently wash skin daily with soap and water to help prevent infection.  Be certain to rinse off all of the soap, which can be irritating.    Trim fingernails and try not to scratch. Scratching the sores may leave scars.    Stay home from work or school until all blisters have formed a crust and you are no longer contagious.  Follow-up care  Follow up with your healthcare provider or as directed by our staff.  When to seek medical advice    Fever of 100.4 F (38 C) or higher, or as directed by your healthcare provider    Affected skin is on the face or neck and any of the following occur:  ? Headache  ? Eye pain  ? Changes in vision  ? Sores near the eye  ? Weakness of facial muscles    Pain, redness, or swelling of a joint    Signs of skin infection: colored drainage from the sores, warmth, increasing redness, or increasing pain  Date Last Reviewed: 9/25/2015 2000-2017 The [employer]. [address] [place] [address]. All rights reserved. This information is not intended as a substitute for professional medical care. Always follow your healthcare professional's instructions.        Shingles (Herpes Zoster)     Talk to your healthcare provider about the shingles vaccine.     Shingles is also called herpes zoster. It is a painful skin rash caused by the herpes zoster virus. This is the same virus that causes chickenpox. After a person has chickenpox, the virus remains inactive in the nerve cells. Years later, the virus can become active again and travel to the skin. Most people have shingles only once, but it is possible to have it more than once.  What are the risk factors for shingles?  Anyone who has ever had chickenpox can  develop shingles. But your risk is greater if you:    Are 50 years of age or older    Have an illness that weakens your immune system, such as HIV/AIDS    Have cancer, especially Hodgkin disease or lymphoma    Take medicines that weaken your immune system  What are the symptoms of shingles?    The first sign of shingles is usually pain, burning, tingling, or itching on one part of your face or body. You may also feel as if you have the flu, with fever and chills.    A red rash with small blisters appears within a few days. The rash may appear as follows:   ? The blisters can occur anywhere, but they re most common on the back, chest, or abdomen.  ? They usually appear on only one side of the body, spreading along the nerve pathway where the virus was inactive.   ? The rash can also form around an eye, along one side of the face or neck, or in the mouth.  ? In a few people, usually those with weakened immune systems, shingles appear on more than one part of the body at once.    After a few days, the blisters become dry and form a crust. The crust falls off in days to weeks. The blisters generally do not leave scars.  How is shingles treated?  For most people, shingles heals on its own in a few weeks. But treatment is recommended to help relieve pain, speed healing, and reduce the risk of complications. Antiviral medicines are prescribed within the first 72 hours of the appearance of the rash. To lessen symptoms:    Apply ice packs (wrapped in a thin towel) or cool compresses, or soak in a cool bath.    Use calamine lotion to calm itchy skin.    Ask your healthcare provider about over-the-counter pain relievers. If your pain is severe, your healthcare provider may prescribe stronger pain medicines.  What are the complications of shingles?  Shingles often goes away with no lasting effects. But some people have serious problems long after the blisters have healed:    Postherpetic neuralgia. This is the most common  complication. It is severe nerve pain at the place where the rash used to be. It can last for months, or even years after you have had shingles. Medicines can be prescribed to help relieve the pain and improve quality of life.    Bacterial infection. Shingles blisters may become infected with bacteria. Antibiotic medicine is used to treat the infection.    Eye problems. A person with shingles on the face should see his or her healthcare provider right away. Shingles can cause serious problems with vision, and even blindness.  Very rarely shingles can also lead to pneumonia, hearing problems, brain inflammation, or even death.   When to seek medical care  Contact your healthcare provider if you experience any of the following:    Symptoms that don t go away with treatment    A rash or blisters near your eye    Increased drainage, fever, or rash after treatment, or severe pain that doesn t go away   How can shingles be prevented?  You can only get shingles if you have had chickenpox in the past. Those who have never had chickenpox can get the virus from you. Although instead of developing shingles, the person may get chickenpox. Until your blisters form scabs, avoid contact with others, especially the following:    Pregnant women who have never had chickenpox or the vaccine    Infants who were born early (prematurely) or who had low weight at birth    People with weak immune system (for example, people receiving chemotherapy for cancer, people who have had organ transplants, or people with HIV infections)     The shingles vaccine  Shingles vaccines are available to help prevent shingles or make it less painful. Vaccination is recommended for adults 50 and older, even if you've had shingles in the past. Talk with your healthcare provider about the most appropriate time for you to get vaccinated, and which vaccine is best for you.   Date Last Reviewed: 10/1/2016    4845-8070 [employer]. [address], [place] [address]. All rights reserved. This information is not intended as a substitute for professional medical care. Always follow your healthcare professional's instructions.                Follow-ups after your visit        Who to contact     If you have questions or need follow up information about today's clinic visit or your schedule please contact [facility] directly at [phone].  Normal or non-critical lab and imaging results will be communicated to you by MyChart, letter or phone within 4 business days after the clinic has received the results. If you do not hear from us within 7 days, please contact the clinic through SENSIMEDhart or phone. If you have a critical or abnormal lab result, we will notify you by phone as soon as possible.  Submit refill requests through Adea or call your pharmacy and they will forward the refill request to us. Please allow 3 business days for your refill to be completed.          Additional Information About Your Visit        MyChart Information     Adea gives you secure access to your electronic health record. If you see a primary care provider, you can also send messages to your care team and make appointments. If you have questions, please call your primary care clinic.  If you do not have a primary care provider, please call [phone] and they will assist you.        Care EveryWhere ID     This is your Care EveryWhere ID. This could be used by other organizations to access your [place] medical records  [id number]        Your Vitals Were     Pulse Temperature Pulse Oximetry             91 98.4  F (36.9  C) (Tympanic) 96%          Blood Pressure from Last 3 Encounters:   08/03/18 104/60   10/25/17 106/60   10/23/17 104/62    Weight from Last 3 Encounters:   10/25/17 167 lb 12.8 oz (76.1 kg)   10/23/17 167 lb 9.6 oz (76 kg)   09/20/17 169 lb 1.6 oz (76.7 kg)              Today, you had the following     No orders found for display          Today's Medication Changes          These changes are accurate as of 8/3/18  2:40 PM.  If you have any questions, ask your nurse or doctor.               Start taking these medicines.        Dose/Directions    valACYclovir 1000 mg tablet   Commonly known as:  VALTREX   Used for:  Herpes zoster without complication        Dose:  1000 mg   Take 1 tablet (1,000 mg) by mouth 3 times daily for 7 days   Quantity:  21 tablet   Refills:  0            Where to get your medicines      These medications were sent to [facility] - [place], [place] - [address] [place] [facility] [address], [facility] [address]    Hours:  called pharm.  they do accept faxes Phone:  [phone]     valACYclovir 1000 mg tablet                Primary Care Provider Office Phone # Fax #    Vianca Jacobo Juan Pablo,  [phone]       [address] [facility] [address]        Equal Access to Services     CASSANDRA GILBERT AH: Hadii aad ku hadasho Soomaali, waaxda luqadaha, qaybta kaalmada adeegyada, waxay anain hayhaylien yanci ennis . So [facility] [phone].    ATENCIÓN: Si habla español, tiene a lee disposición servicios gratuitos de asistencia lingüística. [facility] [phone].    We comply with applicable federal civil rights laws and [place] laws. We do not discriminate on the basis of race, color, national origin, age, disability, sex, sexual orientation, or gender identity.            Thank you!     Thank you for choosing [facility]  for your care. Our goal is always to provide you with excellent care. Hearing back from our patients is one way we can continue to improve our services. Please take a few minutes to complete the written survey that you may receive in the mail after your visit with us. Thank you!             Your Updated Medication List - Protect others around you: Learn how to safely use, store and throw away your medicines at  www.disposemymeds.org.          This list is accurate as of 8/3/18  2:40 PM.  Always use your most recent med list.                   Brand Name Dispense Instructions for use Diagnosis    ALLEGRA PO           APNO Crea ointment     70 g    Apply 2 g topically every hour as needed for skin care    Postpartum nipple pain       breast pump Misc     1 each    1 each daily    Prenatal care in third trimester, Postpartum care and examination of lactating mother       docusate sodium 100 MG tablet    COLACE    60 tablet    Take 100 mg by mouth daily    S/P laparoscopy       fluconazole 150 MG tablet    DIFLUCAN    4 tablet    Take 1 tablet (150 mg) by mouth every 3 days    Candidiasis of vulva and vagina       ibuprofen 800 MG tablet    ADVIL/MOTRIN    90 tablet    Take 1 tablet (800 mg) by mouth every 8 hours as needed for moderate pain     (spontaneous vaginal delivery)       Multi-vitamin Tabs tablet      Take 1 tablet by mouth daily        nystatin [id number] UNIT/ML suspension    MYCOSTATIN    60 mL    Take 5 mLs (500,000 Units) by mouth 4 times daily    Nipple infection       valACYclovir 1000 mg tablet    VALTREX    21 tablet    Take 1 tablet (1,000 mg) by mouth 3 times daily for 7 days    Herpes zoster without complication

## 2018-08-03 NOTE — PATIENT INSTRUCTIONS
Tylenol, Ibuprofen for the pain    follow up with your primary care provider if not better in 7 days.        Shingles  Shingles is a viral infection caused by the same virus as chicken pox. Anyone who has had chicken pox may get shingles later in life. The virus stays in the body, but remains dormant (asleep). Shingles often occurs in older persons or persons with lowered immunity. But it can affect anyone at any age.  Shingles starts as a tingling patch of skin on one side of the body. Small, painful blisters may then appear. The rash does not spread to the rest of the body.  Exposure to shingles cannot cause shingles. However, it can cause chicken pox in anyone who has not had chicken pox or has not been vaccinated. The contagious period ends when all blisters have crusted over (generally about 2 weeks after the illness begins).  After the blisters heal, the affected skin may be sensitive or painful for months (neuralgia). This often gradually goes away.  A shingles vaccine is available. This can help prevent shingles or make it less painful. It is generally recommended for adults over the age of 60 who have had chicken pox in the past, but who have never had shingles. Adults over 60 who have had neither chicken pox nor shingles can prevent both diseases with the chicken pox vaccine. Ask your healthcare provider about these vaccines.  Home care    Medicines may be prescribed to help relieve pain. Take these medicines as directed. Ask your healthcare provider or pharmacist before using over-the-counter medicines for helping treat pain and itching.    In certain cases, antiviral medicines may be prescribed to reduce pain, shorten the illness, and prevent neuralgia. Take these medicines as directed.    Compresses made from a solution of cool water mixed with cornstarch or baking soda may help relieve pain and itching.     Gently wash skin daily with soap and water to help prevent infection.  Be certain to rinse off  all of the soap, which can be irritating.    Trim fingernails and try not to scratch. Scratching the sores may leave scars.    Stay home from work or school until all blisters have formed a crust and you are no longer contagious.  Follow-up care  Follow up with your healthcare provider or as directed by our staff.  When to seek medical advice    Fever of 100.4 F (38 C) or higher, or as directed by your healthcare provider    Affected skin is on the face or neck and any of the following occur:  ? Headache  ? Eye pain  ? Changes in vision  ? Sores near the eye  ? Weakness of facial muscles    Pain, redness, or swelling of a joint    Signs of skin infection: colored drainage from the sores, warmth, increasing redness, or increasing pain  Date Last Reviewed: 9/25/2015 2000-2017 [employer]. [address] [place] [address]. All rights reserved. This information is not intended as a substitute for professional medical care. Always follow your healthcare professional's instructions.        Shingles (Herpes Zoster)     Talk to your healthcare provider about the shingles vaccine.     Shingles is also called herpes zoster. It is a painful skin rash caused by the herpes zoster virus. This is the same virus that causes chickenpox. After a person has chickenpox, the virus remains inactive in the nerve cells. Years later, the virus can become active again and travel to the skin. Most people have shingles only once, but it is possible to have it more than once.  What are the risk factors for shingles?  Anyone who has ever had chickenpox can develop shingles. But your risk is greater if you:    Are 50 years of age or older    Have an illness that weakens your immune system, such as HIV/AIDS    Have cancer, especially Hodgkin disease or lymphoma    Take medicines that weaken your immune system  What are the symptoms of shingles?    The first sign of shingles is usually pain, burning, tingling, or itching  on one part of your face or body. You may also feel as if you have the flu, with fever and chills.    A red rash with small blisters appears within a few days. The rash may appear as follows:   ? The blisters can occur anywhere, but they re most common on the back, chest, or abdomen.  ? They usually appear on only one side of the body, spreading along the nerve pathway where the virus was inactive.   ? The rash can also form around an eye, along one side of the face or neck, or in the mouth.  ? In a few people, usually those with weakened immune systems, shingles appear on more than one part of the body at once.    After a few days, the blisters become dry and form a crust. The crust falls off in days to weeks. The blisters generally do not leave scars.  How is shingles treated?  For most people, shingles heals on its own in a few weeks. But treatment is recommended to help relieve pain, speed healing, and reduce the risk of complications. Antiviral medicines are prescribed within the first 72 hours of the appearance of the rash. To lessen symptoms:    Apply ice packs (wrapped in a thin towel) or cool compresses, or soak in a cool bath.    Use calamine lotion to calm itchy skin.    Ask your healthcare provider about over-the-counter pain relievers. If your pain is severe, your healthcare provider may prescribe stronger pain medicines.  What are the complications of shingles?  Shingles often goes away with no lasting effects. But some people have serious problems long after the blisters have healed:    Postherpetic neuralgia. This is the most common complication. It is severe nerve pain at the place where the rash used to be. It can last for months, or even years after you have had shingles. Medicines can be prescribed to help relieve the pain and improve quality of life.    Bacterial infection. Shingles blisters may become infected with bacteria. Antibiotic medicine is used to treat the infection.    Eye problems. A  person with shingles on the face should see his or her healthcare provider right away. Shingles can cause serious problems with vision, and even blindness.  Very rarely shingles can also lead to pneumonia, hearing problems, brain inflammation, or even death.   When to seek medical care  Contact your healthcare provider if you experience any of the following:    Symptoms that don t go away with treatment    A rash or blisters near your eye    Increased drainage, fever, or rash after treatment, or severe pain that doesn t go away   How can shingles be prevented?  You can only get shingles if you have had chickenpox in the past. Those who have never had chickenpox can get the virus from you. Although instead of developing shingles, the person may get chickenpox. Until your blisters form scabs, avoid contact with others, especially the following:    Pregnant women who have never had chickenpox or the vaccine    Infants who were born early (prematurely) or who had low weight at birth    People with weak immune system (for example, people receiving chemotherapy for cancer, people who have had organ transplants, or people with HIV infections)     The shingles vaccine  Shingles vaccines are available to help prevent shingles or make it less painful. Vaccination is recommended for adults 50 and older, even if you've had shingles in the past. Talk with your healthcare provider about the most appropriate time for you to get vaccinated, and which vaccine is best for you.   Date Last Reviewed: 10/1/2016    5433-4480 [employer]. [address] [place] [address], [place] [address]. All rights reserved. This information is not intended as a substitute for professional medical care. Always follow your healthcare professional's instructions.

## 2018-08-03 NOTE — LETTER
[facility]  [address]  [address]  [facility] [id number] [phone]      August 3, 2018    RE:  Joann Patterson                                                                                                                                                       [address] [place] [address]            To whom it may concern:    Joann Patterson is under my professional care at the [facility] on August 3, 2018.  Because of her current medical problem, please excuse her absence from work on August 3, 2018.    She  may return to work once she starts to feel better.         Sincerely,        Dean Harrison MD    [facility]

## 2018-09-06 ENCOUNTER — RECORDS - HEALTHEAST (OUTPATIENT)
Dept: LAB | Facility: HOSPITAL | Age: 33
End: 2018-09-06

## 2018-09-07 LAB — VZV IGG SER QL IA: POSITIVE

## 2018-09-10 LAB
GAMMA INTERFERON BACKGROUND BLD IA-ACNC: 0.12 IU/ML
M TB IFN-G BLD-IMP: NEGATIVE
MITOGEN IGNF BCKGRD COR BLD-ACNC: 0.03 IU/ML
MITOGEN IGNF BCKGRD COR BLD-ACNC: 0.05 IU/ML
QTF INTERPRETATION: NORMAL
QTF MITOGEN - NIL: >10 IU/ML

## 2019-02-04 ENCOUNTER — OFFICE VISIT (OUTPATIENT)
Dept: OBGYN | Facility: CLINIC | Age: 34
End: 2019-02-04
Payer: COMMERCIAL

## 2019-02-04 VITALS
WEIGHT: 179 LBS | BODY MASS INDEX: 31.71 KG/M2 | HEIGHT: 63 IN | SYSTOLIC BLOOD PRESSURE: 114 MMHG | DIASTOLIC BLOOD PRESSURE: 78 MMHG

## 2019-02-04 DIAGNOSIS — F32.0 CURRENT MILD EPISODE OF MAJOR DEPRESSIVE DISORDER WITHOUT PRIOR EPISODE (H): Primary | ICD-10-CM

## 2019-02-04 PROCEDURE — 99214 OFFICE O/P EST MOD 30 MIN: CPT | Performed by: FAMILY MEDICINE

## 2019-02-04 ASSESSMENT — MIFFLIN-ST. JEOR: SCORE: 1486.07

## 2019-02-04 NOTE — PROGRESS NOTES
SUBJECTIVE:  Joann Patterson is an 33 year old  woman who presents for   gynecology consult for depression.  No LMP recorded. Periods are regular q 28-30 days, lasting   4 days. Dysmenorrhea:none. Cyclic symptoms include none. No intermenstrual bleeding,   spotting, or discharge.    Current contraception: periodic abstinence  History of abnormal Pap smear: No  Family history of uterine or ovarian cancer: No  History of abnormal mammogram: No  Family history of breast cancer: Yes - Maternal Aunt      Concerns today:     - Pt would like to try more natural supplements/vitamins to manage her depression, as she has a PMHx of suicidal ideation from many years ago & does not want this to occur as a side effect of an antidepressant -- declines any recent suicidal ideation. She reports her current symptoms are excessive crying, difficulty controlling her emotions -- specifically worsened when she is away from her son while he is at  or sleeping. What started her depression was when her son's father left them in 2018 & now wants nothing to do with them. She noticed an issue at work, but believes she brought it upon herself because she isolated herself at the beginning of her depression because she didn't want to be emotional in front of her coworkers.    > Pt plans to resume therapy sessions tomorrow         Past Medical History:   Diagnosis Date     Anemia          Asthma     No longer uses inhaler     Chronic pain     Abdominal pain with mensturation.     Endometriosis 2008     Other chronic pain     endometriosis     PONV (postoperative nausea and vomiting)           Family History   Problem Relation Age of Onset     Asthma Mother      Allergies Mother      Hypertension Father      Cardiovascular Father         high cholesterol     Neurologic Disorder Maternal Grandmother         dementia     Breast Cancer Maternal Aunt 52        Stage 3--bilateral masectomy     Cancer Maternal Uncle 51         throat cancer       Past Surgical History:   Procedure Laterality Date     DAVINCI PELVIC PROCEDURE  2012    Procedure: DAVINCI PELVIC PROCEDURE;  Davinci Assisted Laparoscopic Endometriosis Ressection, chromotubation;  Surgeon: Vianca Almeida DO;  Location: [facility] OR     DAVINCI PELVIC PROCEDURE Right 3/15/2016    Procedure: DAVINCI PELVIC PROCEDURE;  Surgeon: Vianca Almeida DO;  Location: [facility] OR     ENT SURGERY  2010    left nasal suction catarization & blood vessel removal     GYN SURGERY  2009    laparoscopic with CO2 laser     HEAD & NECK SURGERY  2005    wisdom teeth extraction     nexplanon  8/20/15       Current Outpatient Medications   Medication     multivitamin, therapeutic with minerals (MULTI-VITAMIN) TABS tablet     APNO ointment CREA cream     docusate sodium (COLACE) 100 MG tablet     Fexofenadine HCl (ALLEGRA PO)     fluconazole (DIFLUCAN) 150 MG tablet     ibuprofen (ADVIL/MOTRIN) 800 MG tablet     Misc. Devices (BREAST PUMP) MISC     nystatin (MYCOSTATIN) 357605 UNIT/ML suspension     valACYclovir (VALTREX) 1000 mg tablet     No current facility-administered medications for this visit.      Allergies   Allergen Reactions     Seasonal Allergies        Social History     Tobacco Use     Smoking status: Former Smoker     Packs/day: 0.25     Years: 10.00     Pack years: 2.50     Types: Cigarettes     Last attempt to quit: 2012     Years since quittin.3     Smokeless tobacco: Never Used   Substance Use Topics     Alcohol use: No     Alcohol/week: 0.0 oz       Review Of Systems  Ears/Nose/Throat: negative  Respiratory: No shortness of breath, dyspnea on exertion, cough, or hemoptysis  Cardiovascular: negative  Gastrointestinal: negative  Genitourinary: negative  Constitutional, HEENT, cardiovascular, pulmonary, GI, , musculoskeletal, neuro, skin, endocrine and psych systems are negative, except as otherwise noted.        This document serves as a record of the services and  "decisions personally performed and made by Vianca Almeida DO. It was created on her behalf by Joanne Aldrich, a trained medical scribe. The creation of this document is based the provider's statements to the medical scribe.  Kenneth Aldrich 3:41 PM, 2019    OBJECTIVE:  /78 (BP Location: Right arm, Patient Position: Sitting, Cuff Size: Adult Regular)   Ht 1.6 m (5' 3\")   Wt 81.2 kg (179 lb)   BMI 31.71 kg/m    General appearance: healthy, alert and no distress  Skin: Skin color, texture, turgor normal. No rashes or lesions.  Lungs: negative, Percussion normal. Good diaphragmatic excursion. Lungs clear  Heart: negative, PMI normal. No lifts, heaves, or thrills. RRR. No murmurs, clicks gallops or rub          LABS:  None          ASSESSMENT:  Joann Patterson is an 33 year old  woman who presents for   gynecology consult for depression.     PLAN:  Dx: Depression  1)  Discussed treatment options   - Natural: Vitamin D [5000 units daily], Magnesium glycinate [300 mg daily] &  B-complex [1 tab po daily]   - Medical: Begin an antidepressant [such as Effexor]  2)  Pt would like to proceed with the natural route, will begin taking supplements & continue with therapy plan to help manage mood   - Strongly encouraged to call with any suicidal ideation or other severe mood changes  3)  Return to clinic in 2 weeks for follow-up; otherwise as needed     25 minutes spent with the patient with greater than 50% of the time spent in counseling the patient.     Rx:  None      The information in this document, created by the medical scribe for me, accurately reflects the services I personally performed and the decisions made by me. I have reviewed and approved this document for accuracy prior to leaving the patient care area.  3:41 PM, 19    Dr. Vianca Almeida DO    Obstetrics and Gynecology  [facility]       "

## 2019-02-04 NOTE — NURSING NOTE
"Chief Complaint   Patient presents with     Depression     discuss natural remedies for depression       Initial /78 (BP Location: Right arm, Patient Position: Sitting, Cuff Size: Adult Regular)   Ht 1.6 m (5' 3\")   Wt 81.2 kg (179 lb)   BMI 31.71 kg/m   Estimated body mass index is 31.71 kg/m  as calculated from the following:    Height as of this encounter: 1.6 m (5' 3\").    Weight as of this encounter: 81.2 kg (179 lb).  BP completed using cuff size: regular    Questioned patient about current smoking habits.  Pt. quit smoking some time ago.          The following HM Due: NONE    "

## 2019-02-04 NOTE — PATIENT INSTRUCTIONS
Vitamin d 5000 units daily     Magnesium glyconate 300 mg daily     B-complex one by mouth daily       [facility]       Return in 2 weeks for recheck

## 2019-02-15 ENCOUNTER — HEALTH MAINTENANCE LETTER (OUTPATIENT)
Age: 34
End: 2019-02-15

## 2019-08-02 ENCOUNTER — E-VISIT (OUTPATIENT)
Dept: OBGYN | Facility: CLINIC | Age: 34
End: 2019-08-02
Payer: COMMERCIAL

## 2019-08-02 DIAGNOSIS — R42 DIZZINESS: Primary | ICD-10-CM

## 2019-08-02 PROCEDURE — 99207 ZZC NO BILLABLE SERVICE THIS VISIT: CPT | Performed by: FAMILY MEDICINE

## 2019-08-04 ENCOUNTER — OFFICE VISIT - HEALTHEAST (OUTPATIENT)
Dept: FAMILY MEDICINE | Facility: CLINIC | Age: 34
End: 2019-08-04

## 2019-08-04 DIAGNOSIS — R09.81 SINUS CONGESTION: ICD-10-CM

## 2019-08-04 DIAGNOSIS — R42 DIZZINESS: ICD-10-CM

## 2019-08-14 ENCOUNTER — OFFICE VISIT - HEALTHEAST (OUTPATIENT)
Dept: FAMILY MEDICINE | Facility: CLINIC | Age: 34
End: 2019-08-14

## 2019-08-14 DIAGNOSIS — H66.93 ACUTE BILATERAL OTITIS MEDIA: ICD-10-CM

## 2019-08-24 ENCOUNTER — OFFICE VISIT - HEALTHEAST (OUTPATIENT)
Dept: FAMILY MEDICINE | Facility: CLINIC | Age: 34
End: 2019-08-24

## 2019-08-24 DIAGNOSIS — H81.13 BENIGN PAROXYSMAL POSITIONAL VERTIGO DUE TO BILATERAL VESTIBULAR DISORDER: ICD-10-CM

## 2020-03-01 ENCOUNTER — HEALTH MAINTENANCE LETTER (OUTPATIENT)
Age: 35
End: 2020-03-01

## 2020-07-08 ENCOUNTER — PRENATAL OFFICE VISIT (OUTPATIENT)
Dept: NURSING | Facility: CLINIC | Age: 35
End: 2020-07-08

## 2020-07-08 ENCOUNTER — TELEPHONE (OUTPATIENT)
Dept: OBGYN | Facility: CLINIC | Age: 35
End: 2020-07-08

## 2020-07-08 DIAGNOSIS — Z34.80 PRENATAL CARE, SUBSEQUENT PREGNANCY, UNSPECIFIED TRIMESTER: Primary | ICD-10-CM

## 2020-07-08 PROCEDURE — 99207 ZZC NO CHARGE NURSE ONLY: CPT

## 2020-07-08 NOTE — PROGRESS NOTES
Patient attended a telephone visit. Prenatal book and folder (containing standard educational hand-outs and brochures) given to patient at first visit. Information in folder reviewed. Questions answered. Brochure given on optional screening available to assess chromosomal anomalies. Pt advised to call the clinic if she has any questions or concerns related to her pregnancy. Prenatal labs obtained. New prenatal visit scheduled on 7/31/20 with Dr Feliz.    8w1d    Lab Results   Component Value Date    PAP NIL 11/25/2015           Patient supplied answers from flow sheet for:  Prenatal OB Questionnaire.  Past Medical History  Diabetes?: No  Hypertension : No  Heart disease, mitral valve prolapse or rheumatic fever?: No  An autoimmune disease such as lupus or rheumatoid arthritis?: No  Kidney disease or urinary tract infection?: No  Epilepsy, seizures or spells?: No  Migraine headaches?: No  A stroke or loss of function or sensation?: No  Any other neurological problems?: No  Have you ever been treated for depression?: (!) Yes(therapy and supplements)  Are you having problems with crying spells or loss of self-esteem?: No  Have you ever required psychiatric care?: No  Have you ever had hepatitis, liver disease or jaundice?: No  Have you been treated for blood clots in your veins, deep vein thromosis, inflammation in the veins, thrombosis, phlebitis, pulmonary embolism or varicosities?: No  Have you had excessive bleeding after surgery or dental work?: No  Do you bleed more than other women after a cut or scratch?: No  Do you have a history of anemia?: No  Have you ever had thyroid problems or taken thyroid medication?: No   Do you have any endocrine problems?: No  Have you ever been in a major accident or suffered serious trauma?: No  Within the last year, has anyone hit, slapped, kicked or otherwise hurt you?: No  In the last year, has anyone forced you to have sex when you didn't want to?: No    Past Medical History 2    Have you ever received a blood transfusion?: No  Would you refuse a blood transfusion if a doctor judged it to be medically necessary?: No   If you answered Yes, would you rather die than receive a blood transfusion?: No  If you answered Yes, is this for Alevism reasons?: No  Does anyone in your home smoke?: No  Do you use tobacco products?: No  Do you drink beer, wine or hard liquor?: No  Do you use any of the following: marijuana, speed, cocaine, heroin, hallucinogens or other drugs?: No   Is your blood type Rh negative?: No  Have you ever had abnormal antibodies in your blood?: No  Have you ever had asthma?: (!) Yes(has inhaler, rarely needs it)  Have you ever had tuberculosis?: No  Do you have any allergies to drugs or over-the-counter medications?: No  Allergies: Dust Mites, Aspartame, Ethanol, Venlafaxine, Hydrochloride, Sertraline: No  Have you had any breast problems?: No  Have you ever ?: (!) Yes  Have you had any gynecological surgical procedures such as cervical conization, a LEEP procedure, laser treatment, cryosurgery of the cervix or a dilation and curettage, etc?: No  Have you ever had any other surgical procedures?: (!) Yes  Have you been hospitalized for a nonsurgical reason excluding normal delivery?: No  Have you ever had any anesthetic complications?: No  Have you ever had an abnormal pap smear?: No    Past Medical History (Continued)  Do you have a history of abnormalities of the uterus?: No  Did your mother take JAREN or any other hormones when she was pregnant with you?: No  Did it take you more than a year to become pregnant?: No  Have you ever been evaluated or treated for infertility?: No  Is there a history of medical problems in your family, which you feel may be important to this pregnancy?: No  Do you have any other problems we have not asked about which you feel may be important to this pregnancy?: No    Symptoms since last menstrual period  Do you have any of the following  symptoms: abdominal pain, blood in stools or urine, chest pain, shortness of breath, coughing or vomiting up blood, your heart racing or skipping beats, nausea and vomiting, pain on urination or vaginal discharge or bleed: No  Will the patient be 35 years old or older at the time of delivery?: (!) Yes    Has the patient, baby's father or anyone in either family had:  Thalassemia (Italian, Greek, Mediterranean or  background only) and an MCV result less than 80?: No  Neural tube defect such as meningomyelocele, spina bifida or anencephaly?: No  Congenital heart defect?: No  Down's Syndrome?: No  Emiliano-Sachs disease (Spiritism, Cajun, Turkish-Mc[employer]ie)?: No  Sickle cell disease or trait ()?: No  Hemophilia or other inherited problems of blood?: No  Muscular dystrophy?: No  Cystic fibrosis?: No  George's chorea?: No  Mental retardation/autism?: (!) Yes(FOB has a son that is autistic and a nephew that is as well)  If yes, was the person tested for fragile X?: No  Any other inherited genetic or chromosomal disorder?: No  Maternal metabolic disorder (e.g Insulin-dependent diabetes, PKU)?: No  A child with birth defects not listed above?: No  Recurrent pregnancy loss or stillbirth?: No   Has the patient had any medications/street drugs/alcohol since her last menstrual period?: No  Does the patient or baby's father have any other genetic risks?: No    Infection History   Do you object to being tested for Hepatitis B?: No  Do you object to being tested for HIV?: No   Do you feel that you are at high risk for coming in contact with the AIDS virus?: No  Have you ever been treated for tuberculosis?: No  Have you ever had a positive skin test for tuberculosis?: No  Do you live with someone who has tuberculosis?: No  Have you ever been exposed to tuberculosis?: No  Do you have genital herpes?: No  Does your partner have genital herpes?: No  Have you had a viral illness since your last period?: No  Have you ever had  gonorrhea, chlamydia, syphilis, venereal warts, trichomoniasis, pelvic inflammatory disease or any other sexually transmitted disease?: (!) Yes(chlamydia as a teen)  Do you know if you are a genital group B streptococcus carrier?: No  Have you had chicken pox/varicella?: (!) Yes   Have you been vaccinated against chicken Pox?: No  Have you had any other infectious diseases?: No

## 2020-07-31 ENCOUNTER — PRENATAL OFFICE VISIT (OUTPATIENT)
Dept: OBGYN | Facility: CLINIC | Age: 35
End: 2020-07-31
Payer: COMMERCIAL

## 2020-07-31 VITALS — WEIGHT: 175 LBS | SYSTOLIC BLOOD PRESSURE: 118 MMHG | BODY MASS INDEX: 31 KG/M2 | DIASTOLIC BLOOD PRESSURE: 68 MMHG

## 2020-07-31 DIAGNOSIS — Z11.3 SCREEN FOR STD (SEXUALLY TRANSMITTED DISEASE): ICD-10-CM

## 2020-07-31 DIAGNOSIS — Z12.4 SCREENING FOR MALIGNANT NEOPLASM OF CERVIX: Primary | ICD-10-CM

## 2020-07-31 DIAGNOSIS — Z34.80 PRENATAL CARE, SUBSEQUENT PREGNANCY, UNSPECIFIED TRIMESTER: ICD-10-CM

## 2020-07-31 DIAGNOSIS — O09.521 HIGH RISK PREGNANCY, MULTIGRAVIDA OF ADVANCED MATERNAL AGE IN FIRST TRIMESTER: ICD-10-CM

## 2020-07-31 PROBLEM — O26.899 PREGNANCY WITH LOW BACK PAIN, ANTEPARTUM: Status: RESOLVED | Noted: 2017-06-27 | Resolved: 2020-07-31

## 2020-07-31 PROBLEM — M54.50 PREGNANCY WITH LOW BACK PAIN, ANTEPARTUM: Status: RESOLVED | Noted: 2017-06-27 | Resolved: 2020-07-31

## 2020-07-31 PROBLEM — O09.529 HIGH RISK PREGNANCY, MULTIGRAVIDA OF ADVANCED MATERNAL AGE: Status: ACTIVE | Noted: 2020-07-31

## 2020-07-31 PROBLEM — Z34.00 SUPERVISION OF NORMAL FIRST PREGNANCY: Status: RESOLVED | Noted: 2017-03-08 | Resolved: 2020-07-31

## 2020-07-31 LAB
ABO + RH BLD: NORMAL
ABO + RH BLD: NORMAL
BLD GP AB SCN SERPL QL: NORMAL
BLOOD BANK CMNT PATIENT-IMP: NORMAL
ERYTHROCYTE [DISTWIDTH] IN BLOOD BY AUTOMATED COUNT: 12.9 % (ref 10–15)
HCT VFR BLD AUTO: 39.4 % (ref 35–47)
HGB BLD-MCNC: 13.1 G/DL (ref 11.7–15.7)
MCH RBC QN AUTO: 31.1 PG (ref 26.5–33)
MCHC RBC AUTO-ENTMCNC: 33.2 G/DL (ref 31.5–36.5)
MCV RBC AUTO: 94 FL (ref 78–100)
PLATELET # BLD AUTO: 317 10E9/L (ref 150–450)
RBC # BLD AUTO: 4.21 10E12/L (ref 3.8–5.2)
SPECIMEN EXP DATE BLD: NORMAL
WBC # BLD AUTO: 9.7 10E9/L (ref 4–11)

## 2020-07-31 PROCEDURE — 87340 HEPATITIS B SURFACE AG IA: CPT | Performed by: OBSTETRICS & GYNECOLOGY

## 2020-07-31 PROCEDURE — 87389 HIV-1 AG W/HIV-1&-2 AB AG IA: CPT | Performed by: OBSTETRICS & GYNECOLOGY

## 2020-07-31 PROCEDURE — G0145 SCR C/V CYTO,THINLAYER,RESCR: HCPCS | Performed by: OBSTETRICS & GYNECOLOGY

## 2020-07-31 PROCEDURE — 86900 BLOOD TYPING SEROLOGIC ABO: CPT | Performed by: OBSTETRICS & GYNECOLOGY

## 2020-07-31 PROCEDURE — 87491 CHLMYD TRACH DNA AMP PROBE: CPT | Performed by: OBSTETRICS & GYNECOLOGY

## 2020-07-31 PROCEDURE — 87591 N.GONORRHOEAE DNA AMP PROB: CPT | Performed by: OBSTETRICS & GYNECOLOGY

## 2020-07-31 PROCEDURE — 87624 HPV HI-RISK TYP POOLED RSLT: CPT | Performed by: OBSTETRICS & GYNECOLOGY

## 2020-07-31 PROCEDURE — 36415 COLL VENOUS BLD VENIPUNCTURE: CPT | Performed by: OBSTETRICS & GYNECOLOGY

## 2020-07-31 PROCEDURE — 99000 SPECIMEN HANDLING OFFICE-LAB: CPT | Performed by: OBSTETRICS & GYNECOLOGY

## 2020-07-31 PROCEDURE — 86762 RUBELLA ANTIBODY: CPT | Performed by: OBSTETRICS & GYNECOLOGY

## 2020-07-31 PROCEDURE — 99207 ZZC FIRST OB VISIT: CPT | Performed by: OBSTETRICS & GYNECOLOGY

## 2020-07-31 PROCEDURE — 85027 COMPLETE CBC AUTOMATED: CPT | Performed by: OBSTETRICS & GYNECOLOGY

## 2020-07-31 PROCEDURE — 86850 RBC ANTIBODY SCREEN: CPT | Performed by: OBSTETRICS & GYNECOLOGY

## 2020-07-31 PROCEDURE — 86901 BLOOD TYPING SEROLOGIC RH(D): CPT | Performed by: OBSTETRICS & GYNECOLOGY

## 2020-07-31 PROCEDURE — 40000791 ZZHCL STATISTIC VERIFI PRENATAL TRISOMY 21,18,13: Mod: 90 | Performed by: OBSTETRICS & GYNECOLOGY

## 2020-07-31 PROCEDURE — 87086 URINE CULTURE/COLONY COUNT: CPT | Performed by: OBSTETRICS & GYNECOLOGY

## 2020-07-31 PROCEDURE — 86780 TREPONEMA PALLIDUM: CPT | Performed by: OBSTETRICS & GYNECOLOGY

## 2020-07-31 NOTE — PROGRESS NOTES
This is a 35 year old female patient,   who presents for her first obstetrical visit. This pregnancy is Planned, Desired.    EDC 2021 by LMP which makes her 11w3d  today.  Her cycles are regular.  Her last menstrual period was normal. Since her LMP, she has experienced  nausea, emesis and headache.  She denies vaginal discharge, pelvic pain and vaginal bleeding. Ultrasound in the 1st trimester showed EDC consistent with dates by LMP.     OB History    Para Term  AB Living   3 1 1 0 1 1   SAB TAB Ectopic Multiple Live Births   1 0 0 0 1      # Outcome Date GA Lbr Patel/2nd Weight Sex Delivery Anes PTL Lv   3 Current            2 Term 17 37w1d 01: 03:50 3 kg (6 lb 9.8 oz) M Vag-Spont EPI N AWILDA      Name: IDANIA GILLIS      Apgar1: 8  Apgar5: 9   1 SAB 2016     SAB   FD       History of GDM: No,  PTL : No,  History of HTN in pregnancy: No,  Thrombocytopenia: No,  Shoulder dystocia: No,  Vacuum Extraction: No  PPH: No   3rd of 4th degree laceration: No.   Other complications: Yes - Cholestasis 1st preg req induction at 37+ weeks.      Since her last LMP she denies use of alcohol, tobacco and street drugs.    HISTORY:  Past Medical History:   Diagnosis Date     Anemia          Asthma     No longer uses inhaler, no Hosp     Chronic pain     Abdominal pain with mensturation.     Endometriosis      Other chronic pain     endometriosis     PONV (postoperative nausea and vomiting)      Past Surgical History:   Procedure Laterality Date     DAVINCI PELVIC PROCEDURE  2012    Procedure: DAVINCI PELVIC PROCEDURE;  Davinci Assisted Laparoscopic Endometriosis Ressection, chromotubation;  Surgeon: Vianca Almeida DO;  Location: [facility] OR     DAVINCI PELVIC PROCEDURE Right 3/15/2016    Procedure: DAVINCI PELVIC PROCEDURE;  Surgeon: Vianca Almeida DO;  Location: [facility] OR     ENT SURGERY      left nasal suction catarization & blood vessel removal     GYN SURGERY   2009    laparoscopic with CO2 laser     HEAD & NECK SURGERY      wisdom teeth extraction     nexplanon  8/20/15     Family History   Problem Relation Age of Onset     Asthma Mother      Allergies Mother      Hypertension Father      Cardiovascular Father         high cholesterol     Neurologic Disorder Maternal Grandmother         dementia     Breast Cancer Maternal Aunt 52        Stage 3--bilateral masectomy     Cancer Maternal Uncle 51        throat cancer     Social History     Socioeconomic History     Marital status: Single     Spouse name: None     Number of children: 0     Years of education: None     Highest education level: None   Occupational History     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   Social Needs     Financial resource strain: None     Food insecurity     Worry: None     Inability: None     Transportation needs     Medical: None     Non-medical: None   Tobacco Use     Smoking status: Former Smoker     Packs/day: 0.25     Years: 10.00     Pack years: 2.50     Types: Cigarettes     Last attempt to quit: 2012     Years since quittin.8     Smokeless tobacco: Never Used   Substance and Sexual Activity     Alcohol use: No     Alcohol/week: 0.0 standard drinks     Drug use: No     Sexual activity: Not Currently     Partners: Male     Birth control/protection: None     Comment: Nexplanon 8/20/15   Lifestyle     Physical activity     Days per week: None     Minutes per session: None     Stress: None   Relationships     Social connections     Talks on phone: None     Gets together: None     Attends Spiritism service: None     Active member of club or organization: None     Attends meetings of clubs or organizations: None     Relationship status: None     Intimate partner violence     Fear of current or ex partner: None     Emotionally abused: None     Physically abused: None     Forced sexual activity: None   Other Topics Concern     Parent/sibling w/ CABG, MI or  angioplasty before 65F 55M? Not Asked      Service Not Asked     Blood Transfusions No     Caffeine Concern Not Asked     Occupational Exposure Not Asked     Hobby Hazards Not Asked     Sleep Concern Not Asked     Stress Concern Not Asked     Weight Concern Not Asked     Special Diet Not Asked     Back Care Not Asked     Exercise Not Asked     Bike Helmet Not Asked     Seat Belt Not Asked     Self-Exams Not Asked   Social History Narrative    Joann lives alone      Current Outpatient Medications   Medication Sig     doxylamine (UNISOM) 25 MG TABS tablet Take 25 mg by mouth At Bedtime     magnesium oxide 200 MG TABS Take 1 tablet by mouth daily     ondansetron (ZOFRAN-ODT) 4 MG ODT tab Take 1 tablet (4 mg) by mouth every 6 hours as needed for nausea     Prenatal MV-Min-FA-Omega-3 (PRENATAL GUMMIES/DHA & FA PO)      vitamin B-Complex Take 1 tablet by mouth daily     vitamin D3 (CHOLECALCIFEROL) 25 mcg (06433 units) capsule Take 1 capsule by mouth daily     No current facility-administered medications for this visit.      Allergies   Allergen Reactions     Seasonal Allergies        Past medical, surgical, social and family history were reviewed and updated in EPIC.    ROS:   12 point review of systems negative other than symptoms noted below.    EXAM:  /68 (BP Location: Right arm, Patient Position: Sitting, Cuff Size: Adult Regular)   Wt 79.4 kg (175 lb)   LMP 05/12/2020 (Exact Date)   BMI 31.00 kg/m     BMI: Body mass index is 31 kg/m .    EXAM:  Constitutional: Appearance: Well nourished, well developed alert, in no acute distress  Chest:  Respiratory Effort:  Breathing unlabored  Cardiovascular:Heart    Auscultation:  Regular rate, normal rhythm, no murmurs present  Gastrointestinal:  Abdominal Examination:  Abdomen nontender to palpation, tone normal without     rigidity or guarding, no masses present, umbilicus without lesions    Liver and speen:  No hepatomegaly present, liver nontender to  palpation    Hernias:  No hernias present    FHTs measured at 176 on U/S today.  Skin:  General Inspection:  No rashes present, no lesions present, no areas of  discoloration.  Neurologic/Psychiatric:    Mental Status:  Oriented X3       Pelvis: External genitalia, Bartholin, urethral, and [place] glands within normal limits. Urethra is without lesion and nontender to palpation. Bladder is nontender. On speculum exam, cervix is without lesion and vagina is normal without lesion or discharge. Pap smear/HPV obtained without incident. GC/Chlam done.    ASSESSMENT:      ICD-10-CM    1. High risk pregnancy, multigravida of advanced maternal age in first trimester  O09.521    2. Prenatal care, subsequent pregnancy, unspecified trimester  Z34.80 ABO/Rh type and screen     Hepatitis B surface antigen     CBC with platelets     HIV Antigen Antibody Combo     Rubella Antibody IgG Quantitative     Treponema Abs w Reflex to RPR and Titer     Urine Culture Aerobic Bacterial       PLAN:    Prenatal labs to be drawn today. She has no questions.    Discussed options for screening for and diagnosis of chromosomal anomalies, including first trimester screen, noninvasive prenatal testing/cell-free fetal DNA testing, CVS/amniocentesis, quad screen, and ultrasound or comprehensive Level II US at 18-20 weeks. She is electing noninvasive prenatal testing today.    MsAFP at next visit.    Level 2 U/S at 20 weeks.    Reviewed early pregnancy education, diet, exercise, prenatal vitamins, intercourse. Reviewed the call schedule, labor and delivery, and the schedule of prenatal visits.    RTC 5 weeks. She is encouraged to call sooner with questions or concerns.      Steven Feliz MD  [facility]

## 2020-07-31 NOTE — NURSING NOTE
"Chief Complaint   Patient presents with     Prenatal Care     11 weeks 3 days- concerns of nausea and vomiting        Initial /68 (BP Location: Right arm, Patient Position: Sitting, Cuff Size: Adult Regular)   Wt 79.4 kg (175 lb)   LMP 2020 (Exact Date)   BMI 31.00 kg/m   Estimated body mass index is 31 kg/m  as calculated from the following:    Height as of 19: 1.6 m (5' 3\").    Weight as of this encounter: 79.4 kg (175 lb).  BP completed using cuff size: large    Questioned patient about current smoking habits.  Pt. has never smoked.          The following HM Due: NONE    11w3d  Charles Toro CMA                "

## 2020-08-01 LAB
BACTERIA SPEC CULT: NORMAL
BACTERIA SPEC CULT: NORMAL
SPECIMEN SOURCE: NORMAL

## 2020-08-02 LAB
C TRACH DNA SPEC QL NAA+PROBE: NEGATIVE
N GONORRHOEA DNA SPEC QL NAA+PROBE: NEGATIVE
SPECIMEN SOURCE: NORMAL
SPECIMEN SOURCE: NORMAL

## 2020-08-03 LAB — RUBV IGG SERPL IA-ACNC: 10 IU/ML

## 2020-08-04 LAB
HBV SURFACE AG SERPL QL IA: NONREACTIVE
HIV 1+2 AB+HIV1 P24 AG SERPL QL IA: NONREACTIVE
T PALLIDUM AB SER QL: NONREACTIVE

## 2020-08-05 LAB
COPATH REPORT: NORMAL
PAP: NORMAL

## 2020-08-07 LAB
FINAL DIAGNOSIS: NORMAL
HPV HR 12 DNA CVX QL NAA+PROBE: NEGATIVE
HPV16 DNA SPEC QL NAA+PROBE: NEGATIVE
HPV18 DNA SPEC QL NAA+PROBE: NEGATIVE
SPECIMEN DESCRIPTION: NORMAL
SPECIMEN SOURCE CVX/VAG CYTO: NORMAL

## 2020-08-10 ENCOUNTER — TELEPHONE (OUTPATIENT)
Dept: OBGYN | Facility: CLINIC | Age: 35
End: 2020-08-10

## 2020-08-10 LAB — LAB SCANNED RESULT: NORMAL

## 2020-08-10 NOTE — TELEPHONE ENCOUNTER
Results received from [facility] testing in [facility]..  Testing done:  Innatal Prenatal Screen    Action:  Spoke to pt and gave NORMAL results.    Gender:**GIRL**  Gender revealed to pt on the phone.    Routed to provider as SANDI ZAMORA RN

## 2020-08-24 ENCOUNTER — TELEPHONE (OUTPATIENT)
Dept: OBGYN | Facility: CLINIC | Age: 35
End: 2020-08-24

## 2020-08-24 DIAGNOSIS — O21.9 NAUSEA AND VOMITING DURING PREGNANCY: Primary | ICD-10-CM

## 2020-08-24 RX ORDER — PROMETHAZINE HYDROCHLORIDE 25 MG/1
25 SUPPOSITORY RECTAL EVERY 6 HOURS PRN
Qty: 20 SUPPOSITORY | Refills: 1 | Status: ON HOLD | OUTPATIENT
Start: 2020-08-24 | End: 2021-02-12

## 2020-08-24 RX ORDER — METOCLOPRAMIDE 10 MG/1
10 TABLET ORAL
Qty: 120 TABLET | Refills: 1 | Status: ON HOLD | OUTPATIENT
Start: 2020-08-24 | End: 2021-02-12

## 2020-08-24 NOTE — TELEPHONE ENCOUNTER
Pt calling. She is having issues with nausea and vomiting. States that the zofran that she has, doesn't help.     She is hoping to get a different rx for N&V.    Also c/o HA. She is having trouble keeping tylenol down.     She is trying to make sure that she is sipping water.     Next pn appt is 8/31/20.     Pt is hoping to avoid needing IV hydration.       Camila ZAMORA RN

## 2020-08-24 NOTE — TELEPHONE ENCOUNTER
Advise Pt I sent Rx Phenergan supp and Reglan po to her pharm.  She should ideally use the Phenergan supp rectally 1 hour before each meal and before bedtime, followed by Reglan 30 min after the Phenergan supps.  She can use her Zofran for breakthrough N/V after that.  The Phenergan can make her drowsy.

## 2020-08-24 NOTE — TELEPHONE ENCOUNTER
Patient informed of message below.  Voiced understanding of instructions.  To call back or send message if continues to have problems with N&V.  Linda Byrnse RN

## 2020-08-31 ENCOUNTER — PRENATAL OFFICE VISIT (OUTPATIENT)
Dept: OBGYN | Facility: CLINIC | Age: 35
End: 2020-08-31
Payer: COMMERCIAL

## 2020-08-31 VITALS — SYSTOLIC BLOOD PRESSURE: 116 MMHG | DIASTOLIC BLOOD PRESSURE: 72 MMHG | WEIGHT: 173 LBS | BODY MASS INDEX: 30.65 KG/M2

## 2020-08-31 DIAGNOSIS — O21.9 NAUSEA AND VOMITING DURING PREGNANCY: ICD-10-CM

## 2020-08-31 DIAGNOSIS — O09.522 HIGH RISK PREGNANCY, MULTIGRAVIDA OF ADVANCED MATERNAL AGE IN SECOND TRIMESTER: Primary | ICD-10-CM

## 2020-08-31 PROCEDURE — 99207 ZZC COMPLICATED OB VISIT: CPT | Performed by: OBSTETRICS & GYNECOLOGY

## 2020-08-31 PROCEDURE — 82105 ALPHA-FETOPROTEIN SERUM: CPT | Mod: 90 | Performed by: OBSTETRICS & GYNECOLOGY

## 2020-08-31 PROCEDURE — 36415 COLL VENOUS BLD VENIPUNCTURE: CPT | Performed by: OBSTETRICS & GYNECOLOGY

## 2020-08-31 PROCEDURE — 99000 SPECIMEN HANDLING OFFICE-LAB: CPT | Performed by: OBSTETRICS & GYNECOLOGY

## 2020-08-31 NOTE — PROGRESS NOTES
Pt had N/V issues but is getting better.  Has required less Rx than before. MsAFP today.  Level 2 U/S in 4 weeks.  RTC 4 weeks.    Encounter Diagnoses   Name Primary?     Nausea and vomiting during pregnancy      High risk pregnancy, multigravida of advanced maternal age in second trimester Yes       Risk factors listed above are stable and being addressed as noted.    Steven Feliz MD  [facility]

## 2020-08-31 NOTE — NURSING NOTE
"Chief Complaint   Patient presents with     Prenatal Care       Initial /72   Wt 78.5 kg (173 lb)   LMP 2020 (Exact Date)   Breastfeeding No   BMI 30.65 kg/m   Estimated body mass index is 30.65 kg/m  as calculated from the following:    Height as of 19: 1.6 m (5' 3\").    Weight as of this encounter: 78.5 kg (173 lb).  BP completed using cuff size: regular    Questioned patient about current smoking habits.  Pt. has never smoked.          15w6d  + FM noted  + mild cramping - ligament pain  + nausea and vomiting  + daily headache  - bleeding  Mariya Huitron LPN                 "

## 2020-09-01 ENCOUNTER — TRANSCRIBE ORDERS (OUTPATIENT)
Dept: MATERNAL FETAL MEDICINE | Facility: CLINIC | Age: 35
End: 2020-09-01

## 2020-09-01 DIAGNOSIS — O26.90 PREGNANCY RELATED CONDITION: Primary | ICD-10-CM

## 2020-09-03 LAB
# FETUSES US: NORMAL
# FETUSES: NORMAL
AFP ADJ MOM AMN: 1.1
AFP SERPL-MCNC: 32 NG/ML
AGE - REPORTED: 35.6 YR
CURRENT SMOKER: NO
CURRENT SMOKER: NO
DIABETES STATUS PATIENT: NO
FAMILY MEMBER DISEASES HX: NO
FAMILY MEMBER DISEASES HX: NO
GA METHOD: NORMAL
GA METHOD: NORMAL
GA: NORMAL WK
IDDM PATIENT QL: NO
INTEGRATED SCN PATIENT-IMP: NORMAL
LMP START DATE: NORMAL
MONOCHORIONIC TWINS: NO
SERVICE CMNT-IMP: NO
SPECIMEN DRAWN SERPL: NORMAL
VALPROIC/CARBAMAZEPINE STATUS: NO
WEIGHT UNITS: NORMAL

## 2020-09-09 ENCOUNTER — PRE VISIT (OUTPATIENT)
Dept: MATERNAL FETAL MEDICINE | Facility: CLINIC | Age: 35
End: 2020-09-09

## 2020-09-16 ENCOUNTER — HOSPITAL ENCOUNTER (OUTPATIENT)
Dept: ULTRASOUND IMAGING | Facility: CLINIC | Age: 35
End: 2020-09-16
Attending: OBSTETRICS & GYNECOLOGY
Payer: COMMERCIAL

## 2020-09-16 ENCOUNTER — OFFICE VISIT (OUTPATIENT)
Dept: MATERNAL FETAL MEDICINE | Facility: CLINIC | Age: 35
End: 2020-09-16
Attending: OBSTETRICS & GYNECOLOGY
Payer: COMMERCIAL

## 2020-09-16 DIAGNOSIS — O26.90 PREGNANCY RELATED CONDITION: ICD-10-CM

## 2020-09-16 DIAGNOSIS — O09.522 MULTIGRAVIDA OF ADVANCED MATERNAL AGE IN SECOND TRIMESTER: ICD-10-CM

## 2020-09-16 PROCEDURE — 76811 OB US DETAILED SNGL FETUS: CPT

## 2020-09-16 NOTE — PROGRESS NOTES
"Please see \"Imaging\" tab under Chart Review for full details.    Dalia Loza MD  Maternal Fetal Medicine    "

## 2020-09-30 ENCOUNTER — PRENATAL OFFICE VISIT (OUTPATIENT)
Dept: OBGYN | Facility: CLINIC | Age: 35
End: 2020-09-30
Payer: COMMERCIAL

## 2020-09-30 VITALS — SYSTOLIC BLOOD PRESSURE: 116 MMHG | DIASTOLIC BLOOD PRESSURE: 70 MMHG | BODY MASS INDEX: 30.47 KG/M2 | WEIGHT: 172 LBS

## 2020-09-30 DIAGNOSIS — Z87.19 HISTORY OF CHOLESTASIS DURING PREGNANCY: ICD-10-CM

## 2020-09-30 DIAGNOSIS — O09.522 HIGH RISK PREGNANCY, MULTIGRAVIDA OF ADVANCED MATERNAL AGE IN SECOND TRIMESTER: Primary | ICD-10-CM

## 2020-09-30 DIAGNOSIS — Z87.59 HISTORY OF CHOLESTASIS DURING PREGNANCY: ICD-10-CM

## 2020-09-30 PROCEDURE — 99207 ZZC PRENATAL VISIT: CPT | Performed by: OBSTETRICS & GYNECOLOGY

## 2020-09-30 NOTE — PROGRESS NOTES
36 yo  at 20w1d with a Hx of ICP.  Had L2 U/S with limited anatomy survey on .  Has follow up scheduled 10/16.    Nausea persists but no emesis.  Baby active.  Works at [employer] but no direct patient contact.    RTC 4 weeks

## 2020-09-30 NOTE — NURSING NOTE
"Chief Complaint   Patient presents with     Prenatal Care     20 1/7 weeks       Initial /70   Wt 78 kg (172 lb)   LMP 2020 (Exact Date)   BMI 30.47 kg/m   Estimated body mass index is 30.47 kg/m  as calculated from the following:    Height as of 19: 1.6 m (5' 3\").    Weight as of this encounter: 78 kg (172 lb).  BP completed using cuff size: regular    Questioned patient about current smoking habits.  Pt. has never smoked.          The following HM Due: NONE    +movements  +nausea but getting much better  -swelling    Elke De La Garza, CMA    "

## 2020-10-16 ENCOUNTER — OFFICE VISIT (OUTPATIENT)
Dept: MATERNAL FETAL MEDICINE | Facility: CLINIC | Age: 35
End: 2020-10-16
Attending: OBSTETRICS & GYNECOLOGY
Payer: COMMERCIAL

## 2020-10-16 ENCOUNTER — HOSPITAL ENCOUNTER (OUTPATIENT)
Dept: ULTRASOUND IMAGING | Facility: CLINIC | Age: 35
End: 2020-10-16
Attending: OBSTETRICS & GYNECOLOGY
Payer: COMMERCIAL

## 2020-10-16 DIAGNOSIS — O09.522 MULTIGRAVIDA OF ADVANCED MATERNAL AGE IN SECOND TRIMESTER: Primary | ICD-10-CM

## 2020-10-16 PROCEDURE — 99207 PR NO CHARGE LOS: CPT | Performed by: OBSTETRICS & GYNECOLOGY

## 2020-10-16 PROCEDURE — 76816 OB US FOLLOW-UP PER FETUS: CPT | Mod: 26 | Performed by: OBSTETRICS & GYNECOLOGY

## 2020-10-16 PROCEDURE — 76816 OB US FOLLOW-UP PER FETUS: CPT

## 2020-10-16 NOTE — PROGRESS NOTES
Please see ultrasound report under Imaging tab for details of today's ultrasound.    Criselda Langley MD  Maternal-Fetal Medicine

## 2020-10-28 ENCOUNTER — PRENATAL OFFICE VISIT (OUTPATIENT)
Dept: OBGYN | Facility: CLINIC | Age: 35
End: 2020-10-28
Payer: COMMERCIAL

## 2020-10-28 VITALS
BODY MASS INDEX: 30.53 KG/M2 | HEIGHT: 63 IN | DIASTOLIC BLOOD PRESSURE: 64 MMHG | SYSTOLIC BLOOD PRESSURE: 110 MMHG | WEIGHT: 172.3 LBS

## 2020-10-28 DIAGNOSIS — O09.522 HIGH RISK PREGNANCY, MULTIGRAVIDA OF ADVANCED MATERNAL AGE IN SECOND TRIMESTER: Primary | ICD-10-CM

## 2020-10-28 DIAGNOSIS — Z87.59 HISTORY OF CHOLESTASIS DURING PREGNANCY: ICD-10-CM

## 2020-10-28 DIAGNOSIS — Z87.19 HISTORY OF CHOLESTASIS DURING PREGNANCY: ICD-10-CM

## 2020-10-28 PROCEDURE — 99207 PR PRENATAL VISIT: CPT | Performed by: FAMILY MEDICINE

## 2020-10-28 ASSESSMENT — MIFFLIN-ST. JEOR: SCORE: 1445.68

## 2020-10-28 NOTE — NURSING NOTE
"24w1d    Chief Complaint   Patient presents with     Prenatal Care     having round ligament pain--started wearing belly band       Initial /64   Ht 1.6 m (5' 3\")   Wt 78.2 kg (172 lb 4.8 oz)   LMP 2020 (Exact Date)   BMI 30.52 kg/m   Estimated body mass index is 30.52 kg/m  as calculated from the following:    Height as of this encounter: 1.6 m (5' 3\").    Weight as of this encounter: 78.2 kg (172 lb 4.8 oz).  BP completed using cuff size: regular    Questioned patient about current smoking habits.  Pt. quit smoking some time ago.          The following HM Due: NONE      "

## 2020-10-28 NOTE — PROGRESS NOTES
"CC: Here for routine prenatal visit   35 year old y/o  @ 24w1d with Estimated Date of Delivery: 2021     /64   Ht 1.6 m (5' 3\")   Wt 78.2 kg (172 lb 4.8 oz)   LMP 2020 (Exact Date)   BMI 30.52 kg/m       See OB flowsheet  + fetal movement, no contractions, no bleeding, no loss of fluid   Discussed monitoring fetal movement     1) concerns: fatigue, having pelvic pain using pelvic band   Covid-19 concerns: none   2) Routine: XX, flu at work,   3) Risk factors:   A: AMA: nl NIPT and AFT, MFM us nl  B: Hx of cholestasis last pgy: test next time, then weekly after that     Patient Active Problem List   Diagnosis     CARDIOVASCULAR SCREENING; LDL GOAL LESS THAN 160     Intermittent asthma     Endometriosis     Vitamin D deficiency     Vitamin B12 deficiency without anemia     Acute low back pain with right-sided sciatica     High risk pregnancy, multigravida of advanced maternal age     Nausea and vomiting during pregnancy     4) Return: 4 weeks for ob visit     Juan Pablo    "

## 2020-10-28 NOTE — PATIENT INSTRUCTIONS
"Return 4 weeks   Return to clinic:  every 4 weeks till 28 weeks, then every 2 weeks till 36 weeks, then weekly till delivery      Phone numbers [place]:  Day/ night [phone] ask for ob triage  Emergency:  Call labor and delivery:  [phone]    What should I call about??    Contraction every 5 minutes for 1 hour 1 minute long (511), bleeding, loss of fluid, headache that doesn't resolve with tylenol, and decreased fetal movement     Start kick counts @ 26-28 weeks   There is an simon for this!  It is called \"count the kicks\"  Keep track of movement and discover your normal baby movement pattern   guideline is listed below  Please call if you do not feel the baby move!  We will have you come in for fetal heart rate monitoring:   Perception of at least 10 FMs during 12 hours of normal maternal activity   Perception of least 10 FMs over two hours when the mother is at rest and focused on [facility] Address   [address], [place], [place] [address] [phone]    Dr. Vianca Almeida, DO    OB/GYN   [facility]                                                      "

## 2020-11-27 ENCOUNTER — PRENATAL OFFICE VISIT (OUTPATIENT)
Dept: OBGYN | Facility: CLINIC | Age: 35
End: 2020-11-27
Payer: COMMERCIAL

## 2020-11-27 VITALS — DIASTOLIC BLOOD PRESSURE: 68 MMHG | BODY MASS INDEX: 31.18 KG/M2 | WEIGHT: 176 LBS | SYSTOLIC BLOOD PRESSURE: 100 MMHG

## 2020-11-27 DIAGNOSIS — Z3A.28 28 WEEKS GESTATION OF PREGNANCY: Primary | ICD-10-CM

## 2020-11-27 DIAGNOSIS — Z87.19 HISTORY OF CHOLESTASIS DURING PREGNANCY: ICD-10-CM

## 2020-11-27 DIAGNOSIS — R73.09 ELEVATED GLUCOSE TOLERANCE TEST: ICD-10-CM

## 2020-11-27 DIAGNOSIS — Z87.59 HISTORY OF CHOLESTASIS DURING PREGNANCY: ICD-10-CM

## 2020-11-27 LAB
ALBUMIN SERPL-MCNC: 2.7 G/DL (ref 3.4–5)
ALP SERPL-CCNC: 86 U/L (ref 40–150)
ALT SERPL W P-5'-P-CCNC: 23 U/L (ref 0–50)
ANION GAP SERPL CALCULATED.3IONS-SCNC: 5 MMOL/L (ref 3–14)
AST SERPL W P-5'-P-CCNC: 16 U/L (ref 0–45)
BILIRUB SERPL-MCNC: 0.2 MG/DL (ref 0.2–1.3)
BUN SERPL-MCNC: 6 MG/DL (ref 7–30)
CALCIUM SERPL-MCNC: 8.6 MG/DL (ref 8.5–10.1)
CHLORIDE SERPL-SCNC: 108 MMOL/L (ref 94–109)
CO2 SERPL-SCNC: 23 MMOL/L (ref 20–32)
CREAT SERPL-MCNC: 0.48 MG/DL (ref 0.52–1.04)
ERYTHROCYTE [DISTWIDTH] IN BLOOD BY AUTOMATED COUNT: 14.1 % (ref 10–15)
GFR SERPL CREATININE-BSD FRML MDRD: >90 ML/MIN/{1.73_M2}
GLUCOSE 1H P 50 G GLC PO SERPL-MCNC: 141 MG/DL (ref 60–129)
GLUCOSE SERPL-MCNC: 143 MG/DL (ref 70–99)
HCT VFR BLD AUTO: 37.3 % (ref 35–47)
HGB BLD-MCNC: 12.2 G/DL (ref 11.7–15.7)
MCH RBC QN AUTO: 31.7 PG (ref 26.5–33)
MCHC RBC AUTO-ENTMCNC: 32.7 G/DL (ref 31.5–36.5)
MCV RBC AUTO: 97 FL (ref 78–100)
PLATELET # BLD AUTO: 326 10E9/L (ref 150–450)
POTASSIUM SERPL-SCNC: 3.6 MMOL/L (ref 3.4–5.3)
PROT SERPL-MCNC: 6.5 G/DL (ref 6.8–8.8)
RBC # BLD AUTO: 3.85 10E12/L (ref 3.8–5.2)
SODIUM SERPL-SCNC: 136 MMOL/L (ref 133–144)
WBC # BLD AUTO: 11.4 10E9/L (ref 4–11)

## 2020-11-27 PROCEDURE — 83789 MASS SPECTROMETRY QUAL/QUAN: CPT | Mod: 90 | Performed by: FAMILY MEDICINE

## 2020-11-27 PROCEDURE — 99000 SPECIMEN HANDLING OFFICE-LAB: CPT | Performed by: FAMILY MEDICINE

## 2020-11-27 PROCEDURE — 90715 TDAP VACCINE 7 YRS/> IM: CPT | Performed by: FAMILY MEDICINE

## 2020-11-27 PROCEDURE — 86780 TREPONEMA PALLIDUM: CPT | Mod: 90 | Performed by: FAMILY MEDICINE

## 2020-11-27 PROCEDURE — 85027 COMPLETE CBC AUTOMATED: CPT | Performed by: FAMILY MEDICINE

## 2020-11-27 PROCEDURE — 80053 COMPREHEN METABOLIC PANEL: CPT | Performed by: FAMILY MEDICINE

## 2020-11-27 PROCEDURE — 99207 PR PRENATAL VISIT: CPT | Performed by: FAMILY MEDICINE

## 2020-11-27 PROCEDURE — 82950 GLUCOSE TEST: CPT | Performed by: FAMILY MEDICINE

## 2020-11-27 PROCEDURE — 90471 IMMUNIZATION ADMIN: CPT | Performed by: FAMILY MEDICINE

## 2020-11-27 NOTE — PROGRESS NOTES
CC: Here for routine prenatal visit   35 year old y/o  @ 28w3d with Estimated Date of Delivery: 2021     /68   Wt 79.8 kg (176 lb)   LMP 2020 (Exact Date)   BMI 31.18 kg/m    See OB flowsheet  + fetal movement, no contractions, no bleeding, no loss of fluid   Discussed monitoring fetal movement     1) concerns:   Covid-19 concerns: none   2) Routine: XX, flu at work,   3) Risk factors:   A: AMA: nl NIPT and AFT, MFM us nl  B: Hx of cholestasis last pgy: test next time, then weekly after that   4) Return: 2 [facility]     [facility]

## 2020-11-27 NOTE — NURSING NOTE
"Chief Complaint   Patient presents with     Prenatal Care     28 3/7 weeks       Initial /68   Wt 79.8 kg (176 lb)   LMP 2020 (Exact Date)   BMI 31.18 kg/m   Estimated body mass index is 31.18 kg/m  as calculated from the following:    Height as of 10/28/20: 1.6 m (5' 3\").    Weight as of this encounter: 79.8 kg (176 lb).  BP completed using cuff size: regular    Questioned patient about current smoking habits.  Pt. has never smoked.          The following HM Due: NONE    +fetal movement  -swelling      Elke De La Garza, CMA    "

## 2020-11-27 NOTE — PATIENT INSTRUCTIONS
"Return 2 weeks   Return to clinic:  every 4 weeks till 28 weeks, then every 2 weeks till 36 weeks, then weekly till delivery      Phone numbers [place]:  Day/ night [phone] ask for ob triage  Emergency:  Call labor and delivery:  [phone]    What should I call about??    Contraction every 5 minutes for 1 hour 1 minute long (511), bleeding, loss of fluid, headache that doesn't resolve with tylenol, and decreased fetal movement     Start kick counts @ 26-28 weeks   There is an simon for this!  It is called \"count the kicks\"  Keep track of movement and discover your normal baby movement pattern   guideline is listed below  Please call if you do not feel the baby move!  We will have you come in for fetal heart rate monitoring:   Perception of at least 10 FMs during 12 hours of normal maternal activity   Perception of least 10 FMs over two hours when the mother is at rest and focused on [facility] Address   [address], [place], [place] [address] [phone]    Dr. Vianca Almeida, DO    OB/GYN   [facility]                                                      "

## 2020-11-30 LAB — T PALLIDUM AB SER QL: NONREACTIVE

## 2020-12-02 LAB
BILE AC SERPL-SCNC: 1.2 UMOL/L (ref 0–2.5)
CDCAE SERPL-SCNC: 0.7 UMOL/L (ref 0–3.4)
CHOLATE SERPL-SCNC: 2.6 UMOL/L (ref 0–7)
DO-CHOLATE SERPL-SCNC: 0.4 UMOL/L (ref 0–1.9)
URSODEOXYCHOLATE SERPL-SCNC: 0.3 UMOL/L (ref 0–1)

## 2020-12-03 DIAGNOSIS — Z87.59 HISTORY OF CHOLESTASIS DURING PREGNANCY: ICD-10-CM

## 2020-12-03 DIAGNOSIS — Z87.19 HISTORY OF CHOLESTASIS DURING PREGNANCY: ICD-10-CM

## 2020-12-03 DIAGNOSIS — Z3A.28 28 WEEKS GESTATION OF PREGNANCY: ICD-10-CM

## 2020-12-03 DIAGNOSIS — R73.09 ELEVATED GLUCOSE TOLERANCE TEST: ICD-10-CM

## 2020-12-03 LAB
ALBUMIN SERPL-MCNC: 2.6 G/DL (ref 3.4–5)
ALBUMIN SERPL-MCNC: 2.7 G/DL (ref 3.4–5)
ALP SERPL-CCNC: 94 U/L (ref 40–150)
ALP SERPL-CCNC: 95 U/L (ref 40–150)
ALT SERPL W P-5'-P-CCNC: 22 U/L (ref 0–50)
ALT SERPL W P-5'-P-CCNC: 22 U/L (ref 0–50)
ANION GAP SERPL CALCULATED.3IONS-SCNC: 6 MMOL/L (ref 3–14)
ANION GAP SERPL CALCULATED.3IONS-SCNC: 9 MMOL/L (ref 3–14)
AST SERPL W P-5'-P-CCNC: 16 U/L (ref 0–45)
AST SERPL W P-5'-P-CCNC: 18 U/L (ref 0–45)
BILIRUB SERPL-MCNC: 0.3 MG/DL (ref 0.2–1.3)
BILIRUB SERPL-MCNC: 0.3 MG/DL (ref 0.2–1.3)
BUN SERPL-MCNC: 5 MG/DL (ref 7–30)
BUN SERPL-MCNC: 6 MG/DL (ref 7–30)
CALCIUM SERPL-MCNC: 8.5 MG/DL (ref 8.5–10.1)
CALCIUM SERPL-MCNC: 8.5 MG/DL (ref 8.5–10.1)
CHLORIDE SERPL-SCNC: 109 MMOL/L (ref 94–109)
CHLORIDE SERPL-SCNC: 109 MMOL/L (ref 94–109)
CO2 SERPL-SCNC: 21 MMOL/L (ref 20–32)
CO2 SERPL-SCNC: 23 MMOL/L (ref 20–32)
CREAT SERPL-MCNC: 0.52 MG/DL (ref 0.52–1.04)
CREAT SERPL-MCNC: 0.52 MG/DL (ref 0.52–1.04)
GFR SERPL CREATININE-BSD FRML MDRD: >90 ML/MIN/{1.73_M2}
GFR SERPL CREATININE-BSD FRML MDRD: >90 ML/MIN/{1.73_M2}
GLUCOSE 1H P 100 G GLC PO SERPL-MCNC: 147 MG/DL (ref 60–179)
GLUCOSE 2H P 100 G GLC PO SERPL-MCNC: 131 MG/DL (ref 60–154)
GLUCOSE 3H P 100 G GLC PO SERPL-MCNC: 114 MG/DL (ref 60–139)
GLUCOSE P FAST SERPL-MCNC: 75 MG/DL (ref 60–94)
GLUCOSE SERPL-MCNC: 74 MG/DL (ref 70–99)
GLUCOSE SERPL-MCNC: 75 MG/DL (ref 70–99)
POTASSIUM SERPL-SCNC: 3.6 MMOL/L (ref 3.4–5.3)
POTASSIUM SERPL-SCNC: 3.6 MMOL/L (ref 3.4–5.3)
PROT SERPL-MCNC: 6.4 G/DL (ref 6.8–8.8)
PROT SERPL-MCNC: 6.7 G/DL (ref 6.8–8.8)
SODIUM SERPL-SCNC: 138 MMOL/L (ref 133–144)
SODIUM SERPL-SCNC: 139 MMOL/L (ref 133–144)

## 2020-12-03 PROCEDURE — 82951 GLUCOSE TOLERANCE TEST (GTT): CPT | Performed by: FAMILY MEDICINE

## 2020-12-03 PROCEDURE — 83789 MASS SPECTROMETRY QUAL/QUAN: CPT | Mod: 90 | Performed by: FAMILY MEDICINE

## 2020-12-03 PROCEDURE — 80053 COMPREHEN METABOLIC PANEL: CPT | Performed by: FAMILY MEDICINE

## 2020-12-03 PROCEDURE — 82952 GTT-ADDED SAMPLES: CPT | Performed by: FAMILY MEDICINE

## 2020-12-06 LAB
BILE AC SERPL-SCNC: 0.3 UMOL/L (ref 0–2.5)
CDCAE SERPL-SCNC: 0.3 UMOL/L (ref 0–3.4)
CHOLATE SERPL-SCNC: 1.2 UMOL/L (ref 0–7)
DO-CHOLATE SERPL-SCNC: 0.3 UMOL/L (ref 0–1.9)
URSODEOXYCHOLATE SERPL-SCNC: 0.3 UMOL/L (ref 0–1)

## 2020-12-10 ENCOUNTER — PRENATAL OFFICE VISIT (OUTPATIENT)
Dept: OBGYN | Facility: CLINIC | Age: 35
End: 2020-12-10
Payer: COMMERCIAL

## 2020-12-10 VITALS — BODY MASS INDEX: 32.06 KG/M2 | SYSTOLIC BLOOD PRESSURE: 108 MMHG | WEIGHT: 181 LBS | DIASTOLIC BLOOD PRESSURE: 68 MMHG

## 2020-12-10 DIAGNOSIS — Z87.19 HISTORY OF CHOLESTASIS DURING PREGNANCY: ICD-10-CM

## 2020-12-10 DIAGNOSIS — Z87.59 HISTORY OF CHOLESTASIS DURING PREGNANCY: ICD-10-CM

## 2020-12-10 DIAGNOSIS — O09.523 HIGH RISK PREGNANCY, MULTIGRAVIDA OF ADVANCED MATERNAL AGE IN THIRD TRIMESTER: Primary | ICD-10-CM

## 2020-12-10 PROCEDURE — 99207 PR PRENATAL VISIT: CPT | Performed by: OBSTETRICS & GYNECOLOGY

## 2020-12-10 PROCEDURE — 83789 MASS SPECTROMETRY QUAL/QUAN: CPT | Mod: 90 | Performed by: FAMILY MEDICINE

## 2020-12-10 PROCEDURE — 99000 SPECIMEN HANDLING OFFICE-LAB: CPT | Performed by: FAMILY MEDICINE

## 2020-12-10 PROCEDURE — 36415 COLL VENOUS BLD VENIPUNCTURE: CPT | Performed by: FAMILY MEDICINE

## 2020-12-10 PROCEDURE — 80053 COMPREHEN METABOLIC PANEL: CPT | Performed by: FAMILY MEDICINE

## 2020-12-10 RX ORDER — BREAST PUMP
1 EACH MISCELLANEOUS PRN
Qty: 1 EACH | Refills: 0 | Status: SHIPPED | OUTPATIENT
Start: 2020-12-10 | End: 2021-08-26

## 2020-12-10 NOTE — NURSING NOTE
"Chief Complaint   Patient presents with     Prenatal Care     30 2/7 weeks       Initial /68   Wt 82.1 kg (181 lb)   LMP 2020 (Exact Date)   BMI 32.06 kg/m   Estimated body mass index is 32.06 kg/m  as calculated from the following:    Height as of 10/28/20: 1.6 m (5' 3\").    Weight as of this encounter: 82.1 kg (181 lb).  BP completed using cuff size: regular    Questioned patient about current smoking habits.  Pt. has never smoked.          The following HM Due: NONE    +fetal movement  -swelling    Elke De La Garza, CMA    "

## 2020-12-10 NOTE — PROGRESS NOTES
36yo  at 30w2d with a Hx of ICP here for routine visit.  Has ICP labs planned for today and weekly until term.  Presently with ?symptoms - has itchy feet one day following a shower.    Baby very active.  RTC 2 weeks

## 2020-12-11 LAB
ALBUMIN SERPL-MCNC: 2.9 G/DL (ref 3.4–5)
ALP SERPL-CCNC: 101 U/L (ref 40–150)
ALT SERPL W P-5'-P-CCNC: 24 U/L (ref 0–50)
ANION GAP SERPL CALCULATED.3IONS-SCNC: 7 MMOL/L (ref 3–14)
AST SERPL W P-5'-P-CCNC: 16 U/L (ref 0–45)
BILIRUB SERPL-MCNC: 0.2 MG/DL (ref 0.2–1.3)
BUN SERPL-MCNC: 6 MG/DL (ref 7–30)
CALCIUM SERPL-MCNC: 8.7 MG/DL (ref 8.5–10.1)
CHLORIDE SERPL-SCNC: 106 MMOL/L (ref 94–109)
CO2 SERPL-SCNC: 24 MMOL/L (ref 20–32)
CREAT SERPL-MCNC: 0.54 MG/DL (ref 0.52–1.04)
GFR SERPL CREATININE-BSD FRML MDRD: >90 ML/MIN/{1.73_M2}
GLUCOSE SERPL-MCNC: 98 MG/DL (ref 70–99)
POTASSIUM SERPL-SCNC: 3.4 MMOL/L (ref 3.4–5.3)
PROT SERPL-MCNC: 6.4 G/DL (ref 6.8–8.8)
SODIUM SERPL-SCNC: 137 MMOL/L (ref 133–144)

## 2020-12-13 ENCOUNTER — HEALTH MAINTENANCE LETTER (OUTPATIENT)
Age: 35
End: 2020-12-13

## 2020-12-16 LAB
BILE AC SERPL-SCNC: 0.9 UMOL/L (ref 0–2.5)
CDCAE SERPL-SCNC: 0.9 UMOL/L (ref 0–3.4)
CHOLATE SERPL-SCNC: 2.4 UMOL/L (ref 0–7)
DO-CHOLATE SERPL-SCNC: 0.3 UMOL/L (ref 0–1.9)
URSODEOXYCHOLATE SERPL-SCNC: 0.3 UMOL/L (ref 0–1)

## 2020-12-17 ENCOUNTER — AMBULATORY - HEALTHEAST (OUTPATIENT)
Dept: LAB | Facility: CLINIC | Age: 35
End: 2020-12-17

## 2020-12-17 ENCOUNTER — TRANSFERRED RECORDS (OUTPATIENT)
Dept: HEALTH INFORMATION MANAGEMENT | Facility: CLINIC | Age: 35
End: 2020-12-17

## 2020-12-17 DIAGNOSIS — Z87.19 HISTORY OF CHOLESTASIS DURING PREGNANCY: ICD-10-CM

## 2020-12-17 DIAGNOSIS — Z87.59 HISTORY OF CHOLESTASIS DURING PREGNANCY: ICD-10-CM

## 2020-12-17 LAB
ALBUMIN SERPL-MCNC: 2.8 G/DL (ref 3.5–5)
ALP SERPL-CCNC: 101 U/L (ref 45–120)
ALT SERPL W P-5'-P-CCNC: 21 U/L (ref 0–45)
ALT SERPL-CCNC: 21 U/L (ref 0–45)
ANION GAP SERPL CALCULATED.3IONS-SCNC: 9 MMOL/L (ref 5–18)
AST SERPL W P-5'-P-CCNC: 17 U/L (ref 0–40)
AST SERPL-CCNC: 17 U/L (ref 0–40)
BILIRUB SERPL-MCNC: 0.3 MG/DL (ref 0–1)
BUN SERPL-MCNC: 5 MG/DL (ref 8–22)
CALCIUM SERPL-MCNC: 8.8 MG/DL (ref 8.5–10.5)
CHLORIDE BLD-SCNC: 107 MMOL/L (ref 98–107)
CO2 SERPL-SCNC: 22 MMOL/L (ref 22–31)
CREAT SERPL-MCNC: 0.59 MG/DL (ref 0.6–1.1)
CREAT SERPL-MCNC: 0.59 MG/DL (ref 0.6–1.1)
GFR SERPL CREATININE-BSD FRML MDRD: >60 ML/MIN/1.73M2
GFR SERPL CREATININE-BSD FRML MDRD: >60 ML/MIN/1.73M2
GLUCOSE BLD-MCNC: 80 MG/DL (ref 70–125)
GLUCOSE SERPL-MCNC: 80 MG/DL (ref 70–125)
POTASSIUM BLD-SCNC: 3.9 MMOL/L (ref 3.5–5)
POTASSIUM SERPL-SCNC: 3.9 MMOL/L (ref 3.5–5)
PROT SERPL-MCNC: 6.6 G/DL (ref 6–8)
SODIUM SERPL-SCNC: 138 MMOL/L (ref 136–145)

## 2020-12-20 LAB — ARUP MISCELLANEOUS TEST: NORMAL

## 2020-12-21 LAB
MISCELLANEOUS TEST DEPT. - HE HISTORICAL: NORMAL
PERFORMING LAB: NORMAL
SPECIMEN STATUS: NORMAL
TEST NAME: NORMAL

## 2020-12-27 ENCOUNTER — NURSE TRIAGE (OUTPATIENT)
Dept: NURSING | Facility: CLINIC | Age: 35
End: 2020-12-27

## 2020-12-27 ENCOUNTER — HOSPITAL ENCOUNTER (OUTPATIENT)
Dept: OBGYN | Facility: CLINIC | Age: 35
Discharge: HOME | End: 2020-12-27
Attending: OBSTETRICS & GYNECOLOGY | Admitting: OBSTETRICS & GYNECOLOGY

## 2020-12-27 LAB
ANION GAP SERPL CALCULATED.3IONS-SCNC: 8 MMOL/L (ref 5–18)
BUN SERPL-MCNC: 5 MG/DL (ref 8–22)
CALCIUM SERPL-MCNC: 8.3 MG/DL (ref 8.5–10.5)
CHLORIDE BLD-SCNC: 110 MMOL/L (ref 98–107)
CO2 SERPL-SCNC: 21 MMOL/L (ref 22–31)
CREAT SERPL-MCNC: 0.55 MG/DL (ref 0.6–1.1)
FETAL RBC % LFV: 0 %
GFR SERPL CREATININE-BSD FRML MDRD: >60 ML/MIN/1.73M2
GLUCOSE BLD-MCNC: 100 MG/DL (ref 70–125)
POTASSIUM BLD-SCNC: 3.3 MMOL/L (ref 3.5–5)
SODIUM SERPL-SCNC: 139 MMOL/L (ref 136–145)

## 2020-12-27 ASSESSMENT — MIFFLIN-ST. JEOR: SCORE: 1462.46

## 2020-12-27 NOTE — TELEPHONE ENCOUNTER
"\"I am 32 weeks pregnant and I just fell down my stairs. I missed the last 2 steps. I am pretty sure I broke ,my right ankle. I can't even walk. I am also having lower right side abdominal pain where my leg and my belly meet. No bleeding, baby is moving. I sort if hit the wall on that side as I fell. \"  Denies other sx   Caller states someone is on their way to get her to bring to ER  Triaged and advised ED  Genny Klein RN [facility]    COVID 19 Nurse Triage Plan/Patient Instructions    Please be aware that novel coronavirus (COVID-19) may be circulating in the community. If you develop symptoms such as fever, cough, or SOB or if you have concerns about the presence of another infection including coronavirus (COVID-19), please contact your health care provider or visit [email or web address].     Disposition/Instructions    ED Visit recommended. Follow protocol based instructions.     Bring Your Own Device:  Please also bring your smart device(s) (smart phones, tablets, laptops) and their charging cables for your personal use and to communicate with your care team during your visit.    Thank you for taking steps to prevent the spread of this virus.  o Limit your contact with others.  o Wear a simple mask to cover your cough.  o Wash your hands well and often.    Resources    [facility]: About COVID-19: [email or web address]/    CDC: What to Do If You're Sick: www.cdc.gov/coronavirus/2019-ncov/about/steps-when-sick.html    CDC: Ending Home Isolation: www.cdc.gov/coronavirus/2019-ncov/hcp/disposition-in-home-patients.html     CDC: Caring for Someone: www.cdc.gov/coronavirus/2019-ncov/if-you-are-sick/care-for-someone.html     [facility]: Interim Guidance for Hospital Discharge to Home: www.health.[facility].[place]/diseases/coronavirus/hcp/hospdischarge.pdf    [facility] clinical trials (COVID-19 research studies): clinicalaffairs.[facility]/n-clinical-trials     Below are the COVID-19 hotlines " at the [place] Department [facility] ([facility]). Interpreters are available.   o For health questions: Call 966-125-5791 or 1-885.350.4698 (7 a.m. to 7 p.m.)  o For questions about schools and childcare: Call 799-879-1129 or 1-872.382.1894 (7 a.m. to 7 p.m.)                       Additional Information    Can't stand (bear weight) or walk (e.g., 4 steps)    Protocols used: ANKLE AND FOOT INJURY-A-[place]

## 2020-12-29 ENCOUNTER — PRENATAL OFFICE VISIT (OUTPATIENT)
Dept: OBGYN | Facility: CLINIC | Age: 35
End: 2020-12-29
Payer: COMMERCIAL

## 2020-12-29 VITALS
SYSTOLIC BLOOD PRESSURE: 94 MMHG | HEIGHT: 63 IN | BODY MASS INDEX: 32.23 KG/M2 | WEIGHT: 181.9 LBS | DIASTOLIC BLOOD PRESSURE: 58 MMHG

## 2020-12-29 DIAGNOSIS — O09.293 HISTORY OF MACROSOMIA IN INFANT IN PRIOR PREGNANCY, CURRENTLY PREGNANT IN THIRD TRIMESTER: Primary | ICD-10-CM

## 2020-12-29 LAB — BILE AC SERPL-SCNC: 2 UMOL/L (ref 0–10)

## 2020-12-29 PROCEDURE — 99207 PR PRENATAL VISIT: CPT | Performed by: FAMILY MEDICINE

## 2020-12-29 RX ORDER — ACETAMINOPHEN 325 MG/1
325-650 TABLET ORAL EVERY 6 HOURS PRN
COMMUNITY

## 2020-12-29 ASSESSMENT — MIFFLIN-ST. JEOR: SCORE: 1489.22

## 2020-12-29 NOTE — NURSING NOTE
"33w0d    Chief Complaint   Patient presents with     Prenatal Care     fell on  and hurt ankle--was in L/D for 5 hours at [facility]       Initial BP 94/58   Ht 1.6 m (5' 3\")   Wt 82.5 kg (181 lb 14.4 oz)   LMP 2020 (Exact Date)   BMI 32.22 kg/m   Estimated body mass index is 32.22 kg/m  as calculated from the following:    Height as of this encounter: 1.6 m (5' 3\").    Weight as of this encounter: 82.5 kg (181 lb 14.4 oz).  BP completed using cuff size: regular    Questioned patient about current smoking habits.  Pt. quit smoking some time ago.          The following HM Due: NONE             "

## 2020-12-29 NOTE — PATIENT INSTRUCTIONS
"Return 2 weeks   Return to clinic:  every 4 weeks till 28 weeks, then every 2 weeks till 36 weeks, then weekly till delivery      Phone numbers [place]:  Day/ night [phone] ask for ob triage  Emergency:  Call labor and delivery:  [phone]    What should I call about??    Contraction every 5 minutes for 1 hour 1 minute long (511), bleeding, loss of fluid, headache that doesn't resolve with tylenol, and decreased fetal movement     Start kick counts @ 26-28 weeks   There is an simon for this!  It is called \"count the kicks\"  Keep track of movement and discover your normal baby movement pattern   guideline is listed below  Please call if you do not feel the baby move!  We will have you come in for fetal heart rate monitoring:   Perception of at least 10 FMs during 12 hours of normal maternal activity   Perception of least 10 FMs over two hours when the mother is at rest and focused on [facility] Address   [address], [place], [place] [address] [phone]    Dr. Vianca Almeida, DO    OB/GYN   [facility]                                                      "

## 2020-12-29 NOTE — PROGRESS NOTES
"CC: Here for routine prenatal visit   35 year old y/o  @ 33w0d with Estimated Date of Delivery: 2021     BP 94/58   Ht 1.6 m (5' 3\")   Wt 82.5 kg (181 lb 14.4 oz)   LMP 2020 (Exact Date)   BMI 32.22 kg/m    See OB flowsheet  + fetal movement, no contractions, no bleeding, no loss of fluid   Discussed monitoring fetal movement     1) concerns: none   Covid-19 concerns: none  2) Routine: XX, flu at work, tdap done, neg nipt, neg afp  3) Risk factors:   A: AMA: nl NIPT and AFP, MFM us nl  B: Hx of cholestasis last pgy: test next time, then weekly after that    If occurs again, plan to delivery @ 37 weeks   C: hx of macrosomia last pregnancy, 90th%:  Growth us serial  4) Return: 2 weeks    [place]    "

## 2020-12-30 ENCOUNTER — ANCILLARY PROCEDURE (OUTPATIENT)
Dept: ULTRASOUND IMAGING | Facility: CLINIC | Age: 35
End: 2020-12-30
Attending: FAMILY MEDICINE
Payer: COMMERCIAL

## 2020-12-30 DIAGNOSIS — O09.293 HISTORY OF MACROSOMIA IN INFANT IN PRIOR PREGNANCY, CURRENTLY PREGNANT IN THIRD TRIMESTER: ICD-10-CM

## 2020-12-30 PROCEDURE — 76816 OB US FOLLOW-UP PER FETUS: CPT | Performed by: OBSTETRICS & GYNECOLOGY

## 2021-01-07 ENCOUNTER — PRENATAL OFFICE VISIT (OUTPATIENT)
Dept: OBGYN | Facility: CLINIC | Age: 36
End: 2021-01-07
Payer: COMMERCIAL

## 2021-01-07 VITALS — DIASTOLIC BLOOD PRESSURE: 68 MMHG | SYSTOLIC BLOOD PRESSURE: 110 MMHG | BODY MASS INDEX: 32.06 KG/M2 | WEIGHT: 181 LBS

## 2021-01-07 DIAGNOSIS — K21.00 GASTROESOPHAGEAL REFLUX DISEASE WITH ESOPHAGITIS WITHOUT HEMORRHAGE: ICD-10-CM

## 2021-01-07 DIAGNOSIS — O09.523 HIGH RISK PREGNANCY, MULTIGRAVIDA OF ADVANCED MATERNAL AGE IN THIRD TRIMESTER: Primary | ICD-10-CM

## 2021-01-07 DIAGNOSIS — Z87.19 HISTORY OF CHOLESTASIS DURING PREGNANCY: ICD-10-CM

## 2021-01-07 DIAGNOSIS — Z87.59 HISTORY OF CHOLESTASIS DURING PREGNANCY: ICD-10-CM

## 2021-01-07 PROCEDURE — 80053 COMPREHEN METABOLIC PANEL: CPT | Performed by: FAMILY MEDICINE

## 2021-01-07 PROCEDURE — 99000 SPECIMEN HANDLING OFFICE-LAB: CPT | Performed by: FAMILY MEDICINE

## 2021-01-07 PROCEDURE — 99207 PR PRENATAL VISIT: CPT | Performed by: FAMILY MEDICINE

## 2021-01-07 PROCEDURE — 36415 COLL VENOUS BLD VENIPUNCTURE: CPT | Performed by: FAMILY MEDICINE

## 2021-01-07 PROCEDURE — 83789 MASS SPECTROMETRY QUAL/QUAN: CPT | Mod: 90 | Performed by: FAMILY MEDICINE

## 2021-01-07 RX ORDER — OMEPRAZOLE 40 MG/1
40 CAPSULE, DELAYED RELEASE ORAL DAILY
Qty: 90 CAPSULE | Refills: 3 | Status: SHIPPED | OUTPATIENT
Start: 2021-01-07 | End: 2021-03-24

## 2021-01-07 NOTE — PROGRESS NOTES
CC: Here for routine prenatal visit   35 year old y/o  @ 34w2d with Estimated Date of Delivery: 2021     /68   Wt 82.1 kg (181 lb)   LMP 2020 (Exact Date)   BMI 32.06 kg/m     See OB flowsheet  + fetal movement, no contractions, no bleeding, no loss of fluid   Discussed monitoring fetal movement     1) concerns: nausea and heartburn  Covid-19 concerns:   2) Routine: XX, flu at work, tdap done, neg nipt, neg afp  3) Risk factors:   A: AMA: nl NIPT and AFP, MFM us nl  B: Hx of cholestasis last pgy: test next time, then weekly after that               If occurs again, plan to delivery @ 37 weeks   C: hx of macrosomia last pregnancy, 90th%:  Growth us serial  4) Return: 2 weeks     [facility]    
Detail Level: Simple

## 2021-01-07 NOTE — PATIENT INSTRUCTIONS
"Return 2 weeks   Return to clinic:  every 4 weeks till 28 weeks, then every 2 weeks till 36 weeks, then weekly till delivery      Phone numbers [place]:  Day/ night [phone] ask for ob triage  Emergency:  Call labor and delivery:  [phone]    What should I call about??    Contraction every 5 minutes for 1 hour 1 minute long (511), bleeding, loss of fluid, headache that doesn't resolve with tylenol, and decreased fetal movement     Start kick counts @ 26-28 weeks   There is an simon for this!  It is called \"count the kicks\"  Keep track of movement and discover your normal baby movement pattern   guideline is listed below  Please call if you do not feel the baby move!  We will have you come in for fetal heart rate monitoring:   Perception of at least 10 FMs during 12 hours of normal maternal activity   Perception of least 10 FMs over two hours when the mother is at rest and focused on [facility] Address   [address], [place], [place] [address] [phone]    Dr. Vianca Almeida, DO    OB/GYN   [facility]                                                      "

## 2021-01-07 NOTE — NURSING NOTE
"Chief Complaint   Patient presents with     Prenatal Care     34 2/7 weeks       Initial /68   Wt 82.1 kg (181 lb)   LMP 2020 (Exact Date)   BMI 32.06 kg/m   Estimated body mass index is 32.06 kg/m  as calculated from the following:    Height as of 20: 1.6 m (5' 3\").    Weight as of this encounter: 82.1 kg (181 lb).  BP completed using cuff size: regular    Questioned patient about current smoking habits.  Pt. has never smoked.          The following HM Due: NONE    +fetal movement  -swelling    Elke De La Garza, CMA    "

## 2021-01-08 LAB
ALBUMIN SERPL-MCNC: 2.7 G/DL (ref 3.4–5)
ALP SERPL-CCNC: 125 U/L (ref 40–150)
ALT SERPL W P-5'-P-CCNC: 24 U/L (ref 0–50)
ANION GAP SERPL CALCULATED.3IONS-SCNC: 7 MMOL/L (ref 3–14)
AST SERPL W P-5'-P-CCNC: 13 U/L (ref 0–45)
BILIRUB SERPL-MCNC: 0.2 MG/DL (ref 0.2–1.3)
BUN SERPL-MCNC: 8 MG/DL (ref 7–30)
CALCIUM SERPL-MCNC: 9.3 MG/DL (ref 8.5–10.1)
CHLORIDE SERPL-SCNC: 107 MMOL/L (ref 94–109)
CO2 SERPL-SCNC: 23 MMOL/L (ref 20–32)
CREAT SERPL-MCNC: 0.57 MG/DL (ref 0.52–1.04)
GFR SERPL CREATININE-BSD FRML MDRD: >90 ML/MIN/{1.73_M2}
GLUCOSE SERPL-MCNC: 79 MG/DL (ref 70–99)
POTASSIUM SERPL-SCNC: 3.7 MMOL/L (ref 3.4–5.3)
PROT SERPL-MCNC: 6.6 G/DL (ref 6.8–8.8)
SODIUM SERPL-SCNC: 137 MMOL/L (ref 133–144)

## 2021-01-12 LAB
BILE AC SERPL-SCNC: 0.8 UMOL/L (ref 0–2.5)
CDCAE SERPL-SCNC: 0.6 UMOL/L (ref 0–3.4)
CHOLATE SERPL-SCNC: 2 UMOL/L (ref 0–7)
DO-CHOLATE SERPL-SCNC: 0.3 UMOL/L (ref 0–1.9)
URSODEOXYCHOLATE SERPL-SCNC: 0.3 UMOL/L (ref 0–1)

## 2021-01-14 ENCOUNTER — AMBULATORY - HEALTHEAST (OUTPATIENT)
Dept: LAB | Facility: CLINIC | Age: 36
End: 2021-01-14

## 2021-01-14 DIAGNOSIS — Z87.19 HISTORY OF CHOLESTASIS DURING PREGNANCY: ICD-10-CM

## 2021-01-14 DIAGNOSIS — Z87.59 HISTORY OF CHOLESTASIS DURING PREGNANCY: ICD-10-CM

## 2021-01-14 LAB
ALBUMIN SERPL-MCNC: 3 G/DL (ref 3.5–5)
ALP SERPL-CCNC: 133 U/L (ref 45–120)
ALT SERPL W P-5'-P-CCNC: 15 U/L (ref 0–45)
ANION GAP SERPL CALCULATED.3IONS-SCNC: 9 MMOL/L (ref 5–18)
AST SERPL W P-5'-P-CCNC: 13 U/L (ref 0–40)
BILIRUB SERPL-MCNC: 0.3 MG/DL (ref 0–1)
BUN SERPL-MCNC: 6 MG/DL (ref 8–22)
CALCIUM SERPL-MCNC: 9.3 MG/DL (ref 8.5–10.5)
CHLORIDE BLD-SCNC: 106 MMOL/L (ref 98–107)
CO2 SERPL-SCNC: 23 MMOL/L (ref 22–31)
CREAT SERPL-MCNC: 0.64 MG/DL (ref 0.6–1.1)
GFR SERPL CREATININE-BSD FRML MDRD: >60 ML/MIN/1.73M2
GLUCOSE BLD-MCNC: 137 MG/DL (ref 70–125)
POTASSIUM BLD-SCNC: 3.7 MMOL/L (ref 3.5–5)
PROT SERPL-MCNC: 6.6 G/DL (ref 6–8)
SODIUM SERPL-SCNC: 138 MMOL/L (ref 136–145)

## 2021-01-18 LAB
ARUP MISCELLANEOUS TEST: NORMAL
MISCELLANEOUS TEST DEPT. - HE HISTORICAL: NORMAL
PERFORMING LAB: NORMAL
SPECIMEN STATUS: NORMAL
TEST NAME: NORMAL

## 2021-01-21 ENCOUNTER — PRENATAL OFFICE VISIT (OUTPATIENT)
Dept: OBGYN | Facility: CLINIC | Age: 36
End: 2021-01-21
Payer: COMMERCIAL

## 2021-01-21 VITALS
WEIGHT: 183.2 LBS | BODY MASS INDEX: 32.46 KG/M2 | HEIGHT: 63 IN | SYSTOLIC BLOOD PRESSURE: 114 MMHG | DIASTOLIC BLOOD PRESSURE: 60 MMHG

## 2021-01-21 DIAGNOSIS — Z87.59 HISTORY OF CHOLESTASIS DURING PREGNANCY: ICD-10-CM

## 2021-01-21 DIAGNOSIS — Z87.19 HISTORY OF CHOLESTASIS DURING PREGNANCY: ICD-10-CM

## 2021-01-21 DIAGNOSIS — O09.293 HISTORY OF MACROSOMIA IN INFANT IN PRIOR PREGNANCY, CURRENTLY PREGNANT IN THIRD TRIMESTER: Primary | ICD-10-CM

## 2021-01-21 DIAGNOSIS — O09.523 HIGH RISK PREGNANCY, MULTIGRAVIDA OF ADVANCED MATERNAL AGE IN THIRD TRIMESTER: ICD-10-CM

## 2021-01-21 PROCEDURE — 99000 SPECIMEN HANDLING OFFICE-LAB: CPT | Performed by: FAMILY MEDICINE

## 2021-01-21 PROCEDURE — 83789 MASS SPECTROMETRY QUAL/QUAN: CPT | Mod: 90 | Performed by: FAMILY MEDICINE

## 2021-01-21 PROCEDURE — 99207 PR PRENATAL VISIT: CPT | Performed by: FAMILY MEDICINE

## 2021-01-21 PROCEDURE — 87653 STREP B DNA AMP PROBE: CPT | Performed by: FAMILY MEDICINE

## 2021-01-21 PROCEDURE — 36415 COLL VENOUS BLD VENIPUNCTURE: CPT | Performed by: FAMILY MEDICINE

## 2021-01-21 PROCEDURE — 80053 COMPREHEN METABOLIC PANEL: CPT | Performed by: FAMILY MEDICINE

## 2021-01-21 ASSESSMENT — MIFFLIN-ST. JEOR: SCORE: 1495.12

## 2021-01-21 NOTE — NURSING NOTE
"36w2d    Chief Complaint   Patient presents with     Prenatal Care     GBS due--pelvic pain all the time--pain goes into vagina       Initial /60   Ht 1.6 m (5' 3\")   Wt 83.1 kg (183 lb 3.2 oz)   LMP 2020 (Exact Date)   BMI 32.45 kg/m   Estimated body mass index is 32.45 kg/m  as calculated from the following:    Height as of this encounter: 1.6 m (5' 3\").    Weight as of this encounter: 83.1 kg (183 lb 3.2 oz).  BP completed using cuff size: regular    Questioned patient about current smoking habits.  Pt. quit smoking some time ago.          The following HM Due: NONE           "

## 2021-01-21 NOTE — PROGRESS NOTES
"CC: Here for routine prenatal visit   35 year old y/o  @ 36w2d with Estimated Date of Delivery: 2021     /60   Ht 1.6 m (5' 3\")   Wt 83.1 kg (183 lb 3.2 oz)   LMP 2020 (Exact Date)   BMI 32.45 kg/m    See OB flowsheet  + fetal movement, no contractions, no bleeding, no loss of fluid   Discussed monitoring fetal movement     1) concerns:   Covid-19 concerns: none  2) Routine: XX, flu at work, tdap done, neg nipt, neg afp  3) Risk factors:   A: AMA: nl NIPT and AFP, MFM us nl  B: Hx of cholestasis last pgy: test next time, then weekly after that               If occurs again, plan to delivery @ 37 weeks   C: Hx of macrosomia last pregnancy, 90th%:  Growth us ordered. Last us 2020, 26%  4) Return: weekly    [place]    " no

## 2021-01-21 NOTE — PATIENT INSTRUCTIONS
"Return weekly   Labs today  Return to clinic:  every 4 weeks till 28 weeks, then every 2 weeks till 36 weeks, then weekly till delivery      Phone numbers [place]:  Day/ night [phone] ask for ob triage  Emergency:  Call labor and delivery:  [phone]    What should I call about??    Contraction every 5 minutes for 1 hour 1 minute long (511), bleeding, loss of fluid, headache that doesn't resolve with tylenol, and decreased fetal movement     Start kick counts @ 26-28 weeks   There is an simon for this!  It is called \"count the kicks\"  Keep track of movement and discover your normal baby movement pattern   guideline is listed below  Please call if you do not feel the baby move!  We will have you come in for fetal heart rate monitoring:   Perception of at least 10 FMs during 12 hours of normal maternal activity   Perception of least 10 FMs over two hours when the mother is at rest and focused on [facility] Address   [address], [place] [address]  [phone]    Dr. Vianca Almeida, DO    OB/GYN   [facility]                                                      "

## 2021-01-22 LAB
ALBUMIN SERPL-MCNC: 2.7 G/DL (ref 3.4–5)
ALP SERPL-CCNC: 142 U/L (ref 40–150)
ALT SERPL W P-5'-P-CCNC: 25 U/L (ref 0–50)
ANION GAP SERPL CALCULATED.3IONS-SCNC: 10 MMOL/L (ref 3–14)
AST SERPL W P-5'-P-CCNC: 19 U/L (ref 0–45)
BILIRUB SERPL-MCNC: 0.3 MG/DL (ref 0.2–1.3)
BUN SERPL-MCNC: 8 MG/DL (ref 7–30)
CALCIUM SERPL-MCNC: 9.5 MG/DL (ref 8.5–10.1)
CHLORIDE SERPL-SCNC: 106 MMOL/L (ref 94–109)
CO2 SERPL-SCNC: 20 MMOL/L (ref 20–32)
CREAT SERPL-MCNC: 0.51 MG/DL (ref 0.52–1.04)
GFR SERPL CREATININE-BSD FRML MDRD: >90 ML/MIN/{1.73_M2}
GLUCOSE SERPL-MCNC: 70 MG/DL (ref 70–99)
GP B STREP DNA SPEC QL NAA+PROBE: POSITIVE
POTASSIUM SERPL-SCNC: 3.8 MMOL/L (ref 3.4–5.3)
PROT SERPL-MCNC: 6.6 G/DL (ref 6.8–8.8)
SODIUM SERPL-SCNC: 136 MMOL/L (ref 133–144)
SPECIMEN SOURCE: ABNORMAL

## 2021-01-25 ENCOUNTER — ANCILLARY PROCEDURE (OUTPATIENT)
Dept: ULTRASOUND IMAGING | Facility: CLINIC | Age: 36
End: 2021-01-25
Attending: FAMILY MEDICINE
Payer: COMMERCIAL

## 2021-01-25 DIAGNOSIS — O09.293 HISTORY OF MACROSOMIA IN INFANT IN PRIOR PREGNANCY, CURRENTLY PREGNANT IN THIRD TRIMESTER: ICD-10-CM

## 2021-01-25 LAB
BILE AC SERPL-SCNC: 0.6 UMOL/L (ref 0–2.5)
CDCAE SERPL-SCNC: 0.6 UMOL/L (ref 0–3.4)
CHOLATE SERPL-SCNC: 1.8 UMOL/L (ref 0–7)
DO-CHOLATE SERPL-SCNC: 0.3 UMOL/L (ref 0–1.9)
URSODEOXYCHOLATE SERPL-SCNC: 0.3 UMOL/L (ref 0–1)

## 2021-01-25 PROCEDURE — 76816 OB US FOLLOW-UP PER FETUS: CPT | Performed by: OBSTETRICS & GYNECOLOGY

## 2021-01-28 ENCOUNTER — PRENATAL OFFICE VISIT (OUTPATIENT)
Dept: OBGYN | Facility: CLINIC | Age: 36
End: 2021-01-28
Payer: COMMERCIAL

## 2021-01-28 VITALS
WEIGHT: 181.5 LBS | DIASTOLIC BLOOD PRESSURE: 80 MMHG | BODY MASS INDEX: 32.16 KG/M2 | HEIGHT: 63 IN | SYSTOLIC BLOOD PRESSURE: 130 MMHG

## 2021-01-28 DIAGNOSIS — Z87.59 HISTORY OF CHOLESTASIS DURING PREGNANCY: ICD-10-CM

## 2021-01-28 DIAGNOSIS — O09.523 HIGH RISK PREGNANCY, MULTIGRAVIDA OF ADVANCED MATERNAL AGE IN THIRD TRIMESTER: Primary | ICD-10-CM

## 2021-01-28 DIAGNOSIS — Z87.19 HISTORY OF CHOLESTASIS DURING PREGNANCY: ICD-10-CM

## 2021-01-28 PROCEDURE — 80053 COMPREHEN METABOLIC PANEL: CPT | Performed by: FAMILY MEDICINE

## 2021-01-28 PROCEDURE — 36415 COLL VENOUS BLD VENIPUNCTURE: CPT | Performed by: FAMILY MEDICINE

## 2021-01-28 PROCEDURE — 99207 PR PRENATAL VISIT: CPT | Performed by: FAMILY MEDICINE

## 2021-01-28 PROCEDURE — 99000 SPECIMEN HANDLING OFFICE-LAB: CPT | Performed by: FAMILY MEDICINE

## 2021-01-28 PROCEDURE — 83789 MASS SPECTROMETRY QUAL/QUAN: CPT | Mod: 90 | Performed by: FAMILY MEDICINE

## 2021-01-28 ASSESSMENT — MIFFLIN-ST. JEOR: SCORE: 1487.41

## 2021-01-28 NOTE — NURSING NOTE
"37w2d    Chief Complaint   Patient presents with     Prenatal Care     GBS +       Initial /80   Ht 1.6 m (5' 3\")   Wt 82.3 kg (181 lb 8 oz)   LMP 2020 (Exact Date)   BMI 32.15 kg/m   Estimated body mass index is 32.15 kg/m  as calculated from the following:    Height as of this encounter: 1.6 m (5' 3\").    Weight as of this encounter: 82.3 kg (181 lb 8 oz).  BP completed using cuff size: regular    Questioned patient about current smoking habits.  Pt. quit smoking some time ago.          The following HM Due: NONE             "

## 2021-01-28 NOTE — PATIENT INSTRUCTIONS
"Return weekly   Return to clinic:  every 4 weeks till 28 weeks, then every 2 weeks till 36 weeks, then weekly till delivery      Phone numbers [place]:  Day/ night [phone] ask for ob triage  Emergency:  Call labor and delivery:  [phone]    What should I call about??    Contraction every 5 minutes for 1 hour 1 minute long (511), bleeding, loss of fluid, headache that doesn't resolve with tylenol, and decreased fetal movement     Start kick counts @ 26-28 weeks   There is an simon for this!  It is called \"count the kicks\"  Keep track of movement and discover your normal baby movement pattern   guideline is listed below  Please call if you do not feel the baby move!  We will have you come in for fetal heart rate monitoring:   Perception of at least 10 FMs during 12 hours of normal maternal activity   Perception of least 10 FMs over two hours when the mother is at rest and focused on [facility] Address   [address], [place], [place] [address] [phone]    Dr. Vianca Almeida, DO    OB/GYN   [facility]                                                      "

## 2021-01-28 NOTE — PROGRESS NOTES
"CC: Here for routine prenatal visit   35 year old y/o  @ 37w2d with Estimated Date of Delivery: 2021     /80   Ht 1.6 m (5' 3\")   Wt 82.3 kg (181 lb 8 oz)   LMP 2020 (Exact Date)   BMI 32.15 kg/m    See OB flowsheet  + fetal movement, no contractions, no bleeding, no loss of fluid   Discussed monitoring fetal movement     1) concerns:   Covid-19 concerns:   2) Routine: Blood type A+ RI tdap [x] flu [x] GS [x] NIPT [x] AFP [x] XX  3) Risk factors:   A: AMA: nl NIPT and AFP, MFM us nl  B: Hx of cholestasis last pgy: test next time, then weekly after that               If occurs again, plan to delivery @ 37 weeks   C: Hx of macrosomia last pregnancy, 90th%:  Growth us ordered. Last us 2020, 26%  4) Return: weekly     [place]    "

## 2021-01-29 LAB
ALBUMIN SERPL-MCNC: 2.6 G/DL (ref 3.4–5)
ALP SERPL-CCNC: 156 U/L (ref 40–150)
ALT SERPL W P-5'-P-CCNC: 29 U/L (ref 0–50)
ANION GAP SERPL CALCULATED.3IONS-SCNC: 6 MMOL/L (ref 3–14)
AST SERPL W P-5'-P-CCNC: 19 U/L (ref 0–45)
BILIRUB SERPL-MCNC: 0.2 MG/DL (ref 0.2–1.3)
BUN SERPL-MCNC: 6 MG/DL (ref 7–30)
CALCIUM SERPL-MCNC: 9.4 MG/DL (ref 8.5–10.1)
CHLORIDE SERPL-SCNC: 108 MMOL/L (ref 94–109)
CO2 SERPL-SCNC: 24 MMOL/L (ref 20–32)
CREAT SERPL-MCNC: 0.59 MG/DL (ref 0.52–1.04)
GFR SERPL CREATININE-BSD FRML MDRD: >90 ML/MIN/{1.73_M2}
GLUCOSE SERPL-MCNC: 91 MG/DL (ref 70–99)
POTASSIUM SERPL-SCNC: 3.7 MMOL/L (ref 3.4–5.3)
PROT SERPL-MCNC: 6.6 G/DL (ref 6.8–8.8)
SODIUM SERPL-SCNC: 138 MMOL/L (ref 133–144)

## 2021-01-31 LAB
BILE AC SERPL-SCNC: 1.4 UMOL/L (ref 0–2.5)
CDCAE SERPL-SCNC: 1.5 UMOL/L (ref 0–3.4)
CHOLATE SERPL-SCNC: 4.2 UMOL/L (ref 0–7)
DO-CHOLATE SERPL-SCNC: 1 UMOL/L (ref 0–1.9)
URSODEOXYCHOLATE SERPL-SCNC: 0.3 UMOL/L (ref 0–1)

## 2021-02-04 ENCOUNTER — PRENATAL OFFICE VISIT (OUTPATIENT)
Dept: OBGYN | Facility: CLINIC | Age: 36
End: 2021-02-04
Payer: COMMERCIAL

## 2021-02-04 VITALS
DIASTOLIC BLOOD PRESSURE: 60 MMHG | HEIGHT: 63 IN | WEIGHT: 181.5 LBS | SYSTOLIC BLOOD PRESSURE: 110 MMHG | BODY MASS INDEX: 32.16 KG/M2

## 2021-02-04 DIAGNOSIS — O09.523 MULTIGRAVIDA OF ADVANCED MATERNAL AGE IN THIRD TRIMESTER: Primary | ICD-10-CM

## 2021-02-04 DIAGNOSIS — Z87.19 HISTORY OF CHOLESTASIS DURING PREGNANCY: ICD-10-CM

## 2021-02-04 DIAGNOSIS — Z87.59 HISTORY OF CHOLESTASIS DURING PREGNANCY: ICD-10-CM

## 2021-02-04 PROCEDURE — 99207 PR PRENATAL VISIT: CPT | Performed by: FAMILY MEDICINE

## 2021-02-04 PROCEDURE — 83789 MASS SPECTROMETRY QUAL/QUAN: CPT | Mod: 90 | Performed by: FAMILY MEDICINE

## 2021-02-04 PROCEDURE — 80053 COMPREHEN METABOLIC PANEL: CPT | Performed by: FAMILY MEDICINE

## 2021-02-04 PROCEDURE — 36415 COLL VENOUS BLD VENIPUNCTURE: CPT | Performed by: FAMILY MEDICINE

## 2021-02-04 PROCEDURE — 99000 SPECIMEN HANDLING OFFICE-LAB: CPT | Performed by: FAMILY MEDICINE

## 2021-02-04 ASSESSMENT — MIFFLIN-ST. JEOR: SCORE: 1487.41

## 2021-02-04 NOTE — PATIENT INSTRUCTIONS
"Return weekly   Return to clinic:  every 4 weeks till 28 weeks, then every 2 weeks till 36 weeks, then weekly till delivery      Phone numbers [place]:  Day/ night [phone] ask for ob triage  Emergency:  Call labor and delivery:  [phone]    What should I call about??    Contraction every 5 minutes for 1 hour 1 minute long (511), bleeding, loss of fluid, headache that doesn't resolve with tylenol, and decreased fetal movement     Start kick counts @ 26-28 weeks   There is an simon for this!  It is called \"count the kicks\"  Keep track of movement and discover your normal baby movement pattern   guideline is listed below  Please call if you do not feel the baby move!  We will have you come in for fetal heart rate monitoring:   Perception of at least 10 FMs during 12 hours of normal maternal activity   Perception of least 10 FMs over two hours when the mother is at rest and focused on [facility] Address   [address], [place], [place] [address] [phone]    Dr. Vianca Almeida, DO    OB/GYN   [facility]                                                      "

## 2021-02-04 NOTE — NURSING NOTE
"38w2d    Chief Complaint   Patient presents with     Prenatal Care       Initial /60   Ht 1.6 m (5' 3\")   Wt 82.3 kg (181 lb 8 oz)   LMP 2020 (Exact Date)   BMI 32.15 kg/m   Estimated body mass index is 32.15 kg/m  as calculated from the following:    Height as of this encounter: 1.6 m (5' 3\").    Weight as of this encounter: 82.3 kg (181 lb 8 oz).  BP completed using cuff size: regular    Questioned patient about current smoking habits.  Pt. quit smoking some time ago.          The following HM Due: NONE      "

## 2021-02-04 NOTE — PROGRESS NOTES
"CC: Here for routine prenatal visit   35 year old y/o  @ 38w2d with Estimated Date of Delivery: 2021     /60   Ht 1.6 m (5' 3\")   Wt 82.3 kg (181 lb 8 oz)   LMP 2020 (Exact Date)   BMI 32.15 kg/m    See OB flowsheet  + fetal movement, no contractions, no bleeding, no loss of fluid   Discussed monitoring fetal movement     1) concerns:   Covid-19 concerns: none  2) Routine: Blood type A+ RI tdap [x] flu [x] GS [x] NIPT [x] AFP [x] XX  3) Risk factors:   A: AMA: nl NIPT and AFP, MFM us nl, weekly bpp until 41 weeks, then 2 x week from 41-42 if not induced  B: Hx of cholestasis last pgy: test next time, then weekly after that               If occurs again, plan to delivery @ 37 weeks   C: Hx of macrosomia last pregnancy, 90th%:  Growth us ordered. Last us 2020, 26%    4) Return: weekly     [id number]    "

## 2021-02-05 ENCOUNTER — TELEPHONE (OUTPATIENT)
Dept: OBGYN | Facility: CLINIC | Age: 36
End: 2021-02-05

## 2021-02-05 LAB
ALBUMIN SERPL-MCNC: 2.5 G/DL (ref 3.4–5)
ALP SERPL-CCNC: 172 U/L (ref 40–150)
ALT SERPL W P-5'-P-CCNC: 28 U/L (ref 0–50)
ANION GAP SERPL CALCULATED.3IONS-SCNC: 4 MMOL/L (ref 3–14)
AST SERPL W P-5'-P-CCNC: 20 U/L (ref 0–45)
BILIRUB SERPL-MCNC: 0.3 MG/DL (ref 0.2–1.3)
BUN SERPL-MCNC: 8 MG/DL (ref 7–30)
CALCIUM SERPL-MCNC: 9.2 MG/DL (ref 8.5–10.1)
CHLORIDE SERPL-SCNC: 108 MMOL/L (ref 94–109)
CO2 SERPL-SCNC: 24 MMOL/L (ref 20–32)
CREAT SERPL-MCNC: 0.56 MG/DL (ref 0.52–1.04)
GFR SERPL CREATININE-BSD FRML MDRD: >90 ML/MIN/{1.73_M2}
GLUCOSE SERPL-MCNC: 70 MG/DL (ref 70–99)
POTASSIUM SERPL-SCNC: 3.6 MMOL/L (ref 3.4–5.3)
PROT SERPL-MCNC: 6.6 G/DL (ref 6.8–8.8)
SODIUM SERPL-SCNC: 136 MMOL/L (ref 133–144)

## 2021-02-05 NOTE — TELEPHONE ENCOUNTER
Chart reviewed in Dr. Moreon's absence.  Her notes indicate U/S is for follow up E[facility] due to macrosomia concerns.  It should be fine to do that with OB.  If the patient strongly prefers it be done in [facility] please alter the order accordingly.  Dr. Almeida' do not specify location of U/S follow up.     Dr. Montenegro    Message text

## 2021-02-05 NOTE — TELEPHONE ENCOUNTER
Pt calls stating that she is supposed to have U/s's at [facility].  They are ordered for here.    Do you want her to get them done here or [facility]?    Rosana RIOS R.N.

## 2021-02-05 NOTE — TELEPHONE ENCOUNTER
Spoke to pt, she is fine to do these at our clinic.  Transferred to scheduling.    Rosana RIOS R.N.

## 2021-02-09 ENCOUNTER — ANCILLARY PROCEDURE (OUTPATIENT)
Dept: ULTRASOUND IMAGING | Facility: CLINIC | Age: 36
End: 2021-02-09
Attending: FAMILY MEDICINE
Payer: COMMERCIAL

## 2021-02-09 ENCOUNTER — PRENATAL OFFICE VISIT (OUTPATIENT)
Dept: OBGYN | Facility: CLINIC | Age: 36
End: 2021-02-09
Payer: COMMERCIAL

## 2021-02-09 VITALS — BODY MASS INDEX: 32.24 KG/M2 | SYSTOLIC BLOOD PRESSURE: 122 MMHG | DIASTOLIC BLOOD PRESSURE: 78 MMHG | WEIGHT: 182 LBS

## 2021-02-09 DIAGNOSIS — O09.523 MULTIGRAVIDA OF ADVANCED MATERNAL AGE IN THIRD TRIMESTER: ICD-10-CM

## 2021-02-09 DIAGNOSIS — Z87.19 HISTORY OF CHOLESTASIS DURING PREGNANCY: ICD-10-CM

## 2021-02-09 DIAGNOSIS — O09.523 MULTIGRAVIDA OF ADVANCED MATERNAL AGE IN THIRD TRIMESTER: Primary | ICD-10-CM

## 2021-02-09 DIAGNOSIS — Z87.59 HISTORY OF CHOLESTASIS DURING PREGNANCY: ICD-10-CM

## 2021-02-09 PROCEDURE — 59426 ANTEPARTUM CARE ONLY: CPT | Performed by: OBSTETRICS & GYNECOLOGY

## 2021-02-09 PROCEDURE — 80053 COMPREHEN METABOLIC PANEL: CPT | Performed by: FAMILY MEDICINE

## 2021-02-09 PROCEDURE — 36415 COLL VENOUS BLD VENIPUNCTURE: CPT | Performed by: FAMILY MEDICINE

## 2021-02-09 PROCEDURE — 83789 MASS SPECTROMETRY QUAL/QUAN: CPT | Mod: 90 | Performed by: FAMILY MEDICINE

## 2021-02-09 PROCEDURE — 76819 FETAL BIOPHYS PROFIL W/O NST: CPT | Performed by: OBSTETRICS & GYNECOLOGY

## 2021-02-09 PROCEDURE — 99000 SPECIMEN HANDLING OFFICE-LAB: CPT | Performed by: FAMILY MEDICINE

## 2021-02-09 PROCEDURE — 99207 PR PRENATAL VISIT: CPT | Performed by: OBSTETRICS & GYNECOLOGY

## 2021-02-09 NOTE — NURSING NOTE
"Chief Complaint   Patient presents with     Prenatal Care       Initial /78   Wt 82.6 kg (182 lb)   LMP 2020 (Exact Date)   Breastfeeding No   BMI 32.24 kg/m   Estimated body mass index is 32.24 kg/m  as calculated from the following:    Height as of 21: 1.6 m (5' 3\").    Weight as of this encounter: 82.6 kg (182 lb).  BP completed using cuff size: regular    Questioned patient about current smoking habits.  Pt. has never smoked.          39w0d  + FM daily  - contractions  - bleeding or leaking of fluid  + headache occas.   Mariya Huitron LPN               "

## 2021-02-09 NOTE — PROGRESS NOTES
Doing well. BPP just before her visit, 8/8, reviewed.  Plan is induction of labor at 40w with Dr. Almeida if not delivered. She is interested in stripping membranes today, so that was done.  Signs and symptoms of labor reviewed, including when to call or come in for evaluation.   Follow up weekly until then.  Leticia Miller MD

## 2021-02-10 ENCOUNTER — HOSPITAL ENCOUNTER (INPATIENT)
Facility: CLINIC | Age: 36
LOS: 2 days | Discharge: HOME | End: 2021-02-12
Attending: OBSTETRICS & GYNECOLOGY | Admitting: OBSTETRICS & GYNECOLOGY
Payer: COMMERCIAL

## 2021-02-10 ENCOUNTER — ANESTHESIA (OUTPATIENT)
Dept: OBGYN | Facility: CLINIC | Age: 36
End: 2021-02-10

## 2021-02-10 ENCOUNTER — ANESTHESIA EVENT (OUTPATIENT)
Dept: OBGYN | Facility: CLINIC | Age: 36
End: 2021-02-10

## 2021-02-10 ENCOUNTER — NURSE TRIAGE (OUTPATIENT)
Dept: NURSING | Facility: CLINIC | Age: 36
End: 2021-02-10

## 2021-02-10 PROBLEM — Z36.89 ENCOUNTER FOR TRIAGE IN PREGNANT PATIENT: Status: ACTIVE | Noted: 2021-02-10

## 2021-02-10 LAB
ALBUMIN SERPL-MCNC: 2.6 G/DL (ref 3.4–5)
ALP SERPL-CCNC: 187 U/L (ref 40–150)
ALT SERPL W P-5'-P-CCNC: 25 U/L (ref 0–50)
ANION GAP SERPL CALCULATED.3IONS-SCNC: 6 MMOL/L (ref 3–14)
AST SERPL W P-5'-P-CCNC: 14 U/L (ref 0–45)
BILE AC SERPL-SCNC: 1.3 UMOL/L (ref 0–2.5)
BILIRUB SERPL-MCNC: 0.3 MG/DL (ref 0.2–1.3)
BUN SERPL-MCNC: 8 MG/DL (ref 7–30)
CALCIUM SERPL-MCNC: 9.5 MG/DL (ref 8.5–10.1)
CDCAE SERPL-SCNC: 1.5 UMOL/L (ref 0–3.4)
CHLORIDE SERPL-SCNC: 107 MMOL/L (ref 94–109)
CHOLATE SERPL-SCNC: 4.6 UMOL/L (ref 0–7)
CO2 SERPL-SCNC: 23 MMOL/L (ref 20–32)
CREAT SERPL-MCNC: 0.53 MG/DL (ref 0.52–1.04)
DO-CHOLATE SERPL-SCNC: 1.5 UMOL/L (ref 0–1.9)
GFR SERPL CREATININE-BSD FRML MDRD: >90 ML/MIN/{1.73_M2}
GLUCOSE SERPL-MCNC: 103 MG/DL (ref 70–99)
POTASSIUM SERPL-SCNC: 3.6 MMOL/L (ref 3.4–5.3)
PROT SERPL-MCNC: 6.6 G/DL (ref 6.8–8.8)
SODIUM SERPL-SCNC: 136 MMOL/L (ref 133–144)
URSODEOXYCHOLATE SERPL-SCNC: 0.3 UMOL/L (ref 0–1)

## 2021-02-10 PROCEDURE — 87635 SARS-COV-2 COVID-19 AMP PRB: CPT | Performed by: OBSTETRICS & GYNECOLOGY

## 2021-02-10 PROCEDURE — 3E0R3BZ INTRODUCTION OF ANESTHETIC AGENT INTO SPINAL CANAL, PERCUTANEOUS APPROACH: ICD-10-PCS | Performed by: ANESTHESIOLOGY

## 2021-02-10 PROCEDURE — 250N000009 HC RX 250: Performed by: OBSTETRICS & GYNECOLOGY

## 2021-02-10 PROCEDURE — 722N000001 HC LABOR CARE VAGINAL DELIVERY SINGLE

## 2021-02-10 PROCEDURE — 00HU33Z INSERTION OF INFUSION DEVICE INTO SPINAL CANAL, PERCUTANEOUS APPROACH: ICD-10-PCS | Performed by: ANESTHESIOLOGY

## 2021-02-10 PROCEDURE — 120N000001 HC R&B MED SURG/OB

## 2021-02-10 PROCEDURE — 250N000011 HC RX IP 250 OP 636: Performed by: OBSTETRICS & GYNECOLOGY

## 2021-02-10 PROCEDURE — G0463 HOSPITAL OUTPT CLINIC VISIT: HCPCS | Mod: 25

## 2021-02-10 RX ORDER — ONDANSETRON 2 MG/ML
4 INJECTION INTRAMUSCULAR; INTRAVENOUS EVERY 6 HOURS PRN
Status: DISCONTINUED | OUTPATIENT
Start: 2021-02-10 | End: 2021-02-10

## 2021-02-10 RX ORDER — TRANEXAMIC ACID 10 MG/ML
1 INJECTION, SOLUTION INTRAVENOUS EVERY 30 MIN PRN
Status: DISCONTINUED | OUTPATIENT
Start: 2021-02-10 | End: 2021-02-12 | Stop reason: HOSPADM

## 2021-02-10 RX ORDER — MODIFIED LANOLIN
OINTMENT (GRAM) TOPICAL
Status: DISCONTINUED | OUTPATIENT
Start: 2021-02-10 | End: 2021-02-12 | Stop reason: HOSPADM

## 2021-02-10 RX ORDER — NALOXONE HYDROCHLORIDE 0.4 MG/ML
0.4 INJECTION, SOLUTION INTRAMUSCULAR; INTRAVENOUS; SUBCUTANEOUS
Status: DISCONTINUED | OUTPATIENT
Start: 2021-02-10 | End: 2021-02-10

## 2021-02-10 RX ORDER — OXYTOCIN 10 [USP'U]/ML
10 INJECTION, SOLUTION INTRAMUSCULAR; INTRAVENOUS
Status: DISCONTINUED | OUTPATIENT
Start: 2021-02-10 | End: 2021-02-10

## 2021-02-10 RX ORDER — SODIUM CHLORIDE, SODIUM LACTATE, POTASSIUM CHLORIDE, CALCIUM CHLORIDE 600; 310; 30; 20 MG/100ML; MG/100ML; MG/100ML; MG/100ML
INJECTION, SOLUTION INTRAVENOUS CONTINUOUS
Status: DISCONTINUED | OUTPATIENT
Start: 2021-02-10 | End: 2021-02-10

## 2021-02-10 RX ORDER — OXYTOCIN/0.9 % SODIUM CHLORIDE 30/500 ML
100-340 PLASTIC BAG, INJECTION (ML) INTRAVENOUS CONTINUOUS PRN
Status: COMPLETED | OUTPATIENT
Start: 2021-02-10 | End: 2021-02-10

## 2021-02-10 RX ORDER — AMOXICILLIN 250 MG
2 CAPSULE ORAL 2 TIMES DAILY
Status: DISCONTINUED | OUTPATIENT
Start: 2021-02-11 | End: 2021-02-12 | Stop reason: HOSPADM

## 2021-02-10 RX ORDER — OXYTOCIN/0.9 % SODIUM CHLORIDE 30/500 ML
340 PLASTIC BAG, INJECTION (ML) INTRAVENOUS CONTINUOUS PRN
Status: DISCONTINUED | OUTPATIENT
Start: 2021-02-10 | End: 2021-02-12 | Stop reason: HOSPADM

## 2021-02-10 RX ORDER — IBUPROFEN 800 MG/1
800 TABLET, FILM COATED ORAL EVERY 6 HOURS PRN
Status: DISCONTINUED | OUTPATIENT
Start: 2021-02-10 | End: 2021-02-10

## 2021-02-10 RX ORDER — NALOXONE HYDROCHLORIDE 0.4 MG/ML
0.2 INJECTION, SOLUTION INTRAMUSCULAR; INTRAVENOUS; SUBCUTANEOUS
Status: DISCONTINUED | OUTPATIENT
Start: 2021-02-10 | End: 2021-02-10

## 2021-02-10 RX ORDER — BISACODYL 10 MG
10 SUPPOSITORY, RECTAL RECTAL DAILY PRN
Status: DISCONTINUED | OUTPATIENT
Start: 2021-02-12 | End: 2021-02-12 | Stop reason: HOSPADM

## 2021-02-10 RX ORDER — OXYCODONE AND ACETAMINOPHEN 5; 325 MG/1; MG/1
1 TABLET ORAL
Status: DISCONTINUED | OUTPATIENT
Start: 2021-02-10 | End: 2021-02-10

## 2021-02-10 RX ORDER — ACETAMINOPHEN 325 MG/1
650 TABLET ORAL EVERY 4 HOURS PRN
Status: DISCONTINUED | OUTPATIENT
Start: 2021-02-10 | End: 2021-02-10

## 2021-02-10 RX ORDER — OXYTOCIN/0.9 % SODIUM CHLORIDE 30/500 ML
PLASTIC BAG, INJECTION (ML) INTRAVENOUS
Status: DISCONTINUED
Start: 2021-02-10 | End: 2021-02-11 | Stop reason: HOSPADM

## 2021-02-10 RX ORDER — NALBUPHINE HYDROCHLORIDE 10 MG/ML
2.5-5 INJECTION, SOLUTION INTRAMUSCULAR; INTRAVENOUS; SUBCUTANEOUS EVERY 6 HOURS PRN
Status: DISCONTINUED | OUTPATIENT
Start: 2021-02-10 | End: 2021-02-10

## 2021-02-10 RX ORDER — AMOXICILLIN 250 MG
2 CAPSULE ORAL 2 TIMES DAILY
Status: DISCONTINUED | OUTPATIENT
Start: 2021-02-11 | End: 2021-02-10

## 2021-02-10 RX ORDER — HYDROCORTISONE 2.5 %
CREAM (GRAM) TOPICAL 3 TIMES DAILY PRN
Status: DISCONTINUED | OUTPATIENT
Start: 2021-02-10 | End: 2021-02-12 | Stop reason: HOSPADM

## 2021-02-10 RX ORDER — OXYTOCIN 10 [USP'U]/ML
10 INJECTION, SOLUTION INTRAMUSCULAR; INTRAVENOUS
Status: DISCONTINUED | OUTPATIENT
Start: 2021-02-10 | End: 2021-02-12 | Stop reason: HOSPADM

## 2021-02-10 RX ORDER — EPHEDRINE SULFATE 50 MG/ML
5 INJECTION, SOLUTION INTRAMUSCULAR; INTRAVENOUS; SUBCUTANEOUS
Status: DISCONTINUED | OUTPATIENT
Start: 2021-02-10 | End: 2021-02-10

## 2021-02-10 RX ORDER — OXYTOCIN/0.9 % SODIUM CHLORIDE 30/500 ML
340 PLASTIC BAG, INJECTION (ML) INTRAVENOUS CONTINUOUS PRN
Status: DISCONTINUED | OUTPATIENT
Start: 2021-02-10 | End: 2021-02-10

## 2021-02-10 RX ORDER — METHYLERGONOVINE MALEATE 0.2 MG/ML
200 INJECTION INTRAVENOUS
Status: DISCONTINUED | OUTPATIENT
Start: 2021-02-10 | End: 2021-02-10

## 2021-02-10 RX ORDER — ACETAMINOPHEN 325 MG/1
650 TABLET ORAL EVERY 4 HOURS PRN
Status: DISCONTINUED | OUTPATIENT
Start: 2021-02-10 | End: 2021-02-12 | Stop reason: HOSPADM

## 2021-02-10 RX ORDER — AMOXICILLIN 250 MG
1 CAPSULE ORAL 2 TIMES DAILY
Status: DISCONTINUED | OUTPATIENT
Start: 2021-02-11 | End: 2021-02-12 | Stop reason: HOSPADM

## 2021-02-10 RX ORDER — OXYTOCIN 10 [USP'U]/ML
INJECTION, SOLUTION INTRAMUSCULAR; INTRAVENOUS
Status: DISCONTINUED
Start: 2021-02-10 | End: 2021-02-10 | Stop reason: WASHOUT

## 2021-02-10 RX ORDER — HYDROCORTISONE 2.5 %
CREAM (GRAM) TOPICAL 3 TIMES DAILY PRN
Status: DISCONTINUED | OUTPATIENT
Start: 2021-02-10 | End: 2021-02-10

## 2021-02-10 RX ORDER — LIDOCAINE HYDROCHLORIDE 10 MG/ML
INJECTION, SOLUTION EPIDURAL; INFILTRATION; INTRACAUDAL; PERINEURAL
Status: DISCONTINUED
Start: 2021-02-10 | End: 2021-02-11 | Stop reason: HOSPADM

## 2021-02-10 RX ORDER — PENICILLIN G POTASSIUM 5000000 [IU]/1
5 INJECTION, POWDER, FOR SOLUTION INTRAMUSCULAR; INTRAVENOUS ONCE
Status: DISCONTINUED | OUTPATIENT
Start: 2021-02-10 | End: 2021-02-10

## 2021-02-10 RX ORDER — FENTANYL CITRATE 50 UG/ML
INJECTION, SOLUTION INTRAMUSCULAR; INTRAVENOUS
Status: DISCONTINUED
Start: 2021-02-10 | End: 2021-02-10 | Stop reason: WASHOUT

## 2021-02-10 RX ORDER — FENTANYL CITRATE 50 UG/ML
50-100 INJECTION, SOLUTION INTRAMUSCULAR; INTRAVENOUS
Status: DISCONTINUED | OUTPATIENT
Start: 2021-02-10 | End: 2021-02-10

## 2021-02-10 RX ORDER — BISACODYL 10 MG
10 SUPPOSITORY, RECTAL RECTAL DAILY PRN
Status: DISCONTINUED | OUTPATIENT
Start: 2021-02-12 | End: 2021-02-10

## 2021-02-10 RX ORDER — IBUPROFEN 800 MG/1
800 TABLET, FILM COATED ORAL EVERY 6 HOURS PRN
Status: DISCONTINUED | OUTPATIENT
Start: 2021-02-10 | End: 2021-02-12 | Stop reason: HOSPADM

## 2021-02-10 RX ORDER — MODIFIED LANOLIN
OINTMENT (GRAM) TOPICAL
Status: DISCONTINUED | OUTPATIENT
Start: 2021-02-10 | End: 2021-02-10

## 2021-02-10 RX ORDER — TRANEXAMIC ACID 10 MG/ML
1 INJECTION, SOLUTION INTRAVENOUS EVERY 30 MIN PRN
Status: DISCONTINUED | OUTPATIENT
Start: 2021-02-10 | End: 2021-02-10

## 2021-02-10 RX ORDER — AMOXICILLIN 250 MG
1 CAPSULE ORAL 2 TIMES DAILY
Status: DISCONTINUED | OUTPATIENT
Start: 2021-02-11 | End: 2021-02-10

## 2021-02-10 RX ORDER — OXYTOCIN/0.9 % SODIUM CHLORIDE 30/500 ML
100 PLASTIC BAG, INJECTION (ML) INTRAVENOUS CONTINUOUS
Status: DISCONTINUED | OUTPATIENT
Start: 2021-02-11 | End: 2021-02-10

## 2021-02-10 RX ORDER — IBUPROFEN 800 MG/1
800 TABLET, FILM COATED ORAL
Status: DISCONTINUED | OUTPATIENT
Start: 2021-02-10 | End: 2021-02-10

## 2021-02-10 RX ORDER — OXYTOCIN/0.9 % SODIUM CHLORIDE 30/500 ML
100 PLASTIC BAG, INJECTION (ML) INTRAVENOUS CONTINUOUS
Status: DISCONTINUED | OUTPATIENT
Start: 2021-02-11 | End: 2021-02-12 | Stop reason: HOSPADM

## 2021-02-10 RX ORDER — CARBOPROST TROMETHAMINE 250 UG/ML
250 INJECTION, SOLUTION INTRAMUSCULAR
Status: DISCONTINUED | OUTPATIENT
Start: 2021-02-10 | End: 2021-02-10

## 2021-02-10 RX ADMIN — Medication 340 ML/HR: at 23:22

## 2021-02-10 RX ADMIN — FENTANYL CITRATE 100 MCG: 50 INJECTION, SOLUTION INTRAMUSCULAR; INTRAVENOUS at 23:24

## 2021-02-10 ASSESSMENT — MIFFLIN-ST. JEOR: SCORE: 1489.68

## 2021-02-11 LAB
LABORATORY COMMENT REPORT: NORMAL
SARS-COV-2 RNA RESP QL NAA+PROBE: NEGATIVE
SPECIMEN SOURCE: NORMAL

## 2021-02-11 PROCEDURE — 0KQM0ZZ REPAIR PERINEUM MUSCLE, OPEN APPROACH: ICD-10-PCS | Performed by: OBSTETRICS & GYNECOLOGY

## 2021-02-11 PROCEDURE — 120N000001 HC R&B MED SURG/OB

## 2021-02-11 PROCEDURE — 250N000013 HC RX MED GY IP 250 OP 250 PS 637: Performed by: OBSTETRICS & GYNECOLOGY

## 2021-02-11 RX ORDER — VITAMIN B COMPLEX
25 TABLET ORAL AT BEDTIME
Status: DISCONTINUED | OUTPATIENT
Start: 2021-02-11 | End: 2021-02-12 | Stop reason: HOSPADM

## 2021-02-11 RX ADMIN — IBUPROFEN 800 MG: 800 TABLET ORAL at 02:00

## 2021-02-11 RX ADMIN — IBUPROFEN 800 MG: 800 TABLET ORAL at 15:03

## 2021-02-11 RX ADMIN — IBUPROFEN 800 MG: 800 TABLET ORAL at 21:08

## 2021-02-11 RX ADMIN — B-COMPLEX W/ C & FOLIC ACID TAB 1 TABLET: TAB at 21:08

## 2021-02-11 RX ADMIN — IBUPROFEN 800 MG: 800 TABLET ORAL at 08:38

## 2021-02-11 RX ADMIN — DOCUSATE SODIUM 50 MG AND SENNOSIDES 8.6 MG 2 TABLET: 8.6; 5 TABLET, FILM COATED ORAL at 08:38

## 2021-02-11 RX ADMIN — Medication 200 MG: at 21:08

## 2021-02-11 RX ADMIN — ACETAMINOPHEN 650 MG: 325 TABLET, FILM COATED ORAL at 06:58

## 2021-02-11 RX ADMIN — Medication 25 MCG: at 21:08

## 2021-02-11 RX ADMIN — ACETAMINOPHEN 650 MG: 325 TABLET, FILM COATED ORAL at 12:28

## 2021-02-11 RX ADMIN — DOCUSATE SODIUM 50 MG AND SENNOSIDES 8.6 MG 1 TABLET: 8.6; 5 TABLET, FILM COATED ORAL at 21:09

## 2021-02-11 NOTE — ANESTHESIA PREPROCEDURE EVALUATION
Anesthesia Pre-Procedure Evaluation    Patient: Joann Ordaz   MRN: [id number] : 1985        Preoperative Diagnosis: * No surgery found *   Procedure :      Past Medical History:   Diagnosis Date     Anemia     2016     Asthma     No longer uses inhaler, no Hosp     Chronic pain     Abdominal pain with mensturation.     Depressive disorder      Endometriosis 2008     Other chronic pain     endometriosis     PONV (postoperative nausea and vomiting)       Past Surgical History:   Procedure Laterality Date     DAVINCI PELVIC PROCEDURE  2012    Procedure: DAVINCI PELVIC PROCEDURE;  Davinci Assisted Laparoscopic Endometriosis Ressection, chromotubation;  Surgeon: Vianca Almeida DO;  Location: [facility] OR     DAVINCI PELVIC PROCEDURE Right 3/15/2016    Procedure: DAVINCI PELVIC PROCEDURE;  Surgeon: Vianca Almeida DO;  Location: [facility] OR     ENT SURGERY  2010    left nasal suction catarization & blood vessel removal     GYN SURGERY  2009    laparoscopic with CO2 laser     HEAD & NECK SURGERY  2005    wisdom teeth extraction     nexplanon  8/20/15      Allergies   Allergen Reactions     Seasonal Allergies       Social History     Tobacco Use     Smoking status: Former Smoker     Packs/day: 0.25     Years: 10.00     Pack years: 2.50     Types: Cigarettes     Quit date: 2012     Years since quittin.3     Smokeless tobacco: Never Used   Substance Use Topics     Alcohol use: Not Currently     Alcohol/week: 0.0 standard drinks      Wt Readings from Last 1 Encounters:   02/10/21 82.6 kg (182 lb)        Anesthesia Evaluation            ROS/MED HX  ENT/Pulmonary:     (+) asthma     Neurologic:  - neg neurologic ROS     Cardiovascular:  - neg cardiovascular ROS     METS/Exercise Tolerance:     Hematologic:     (+) anemia,     Musculoskeletal:       GI/Hepatic:     (+) GERD,     Renal/Genitourinary:  - neg Renal ROS     Endo:  - neg endo ROS   (+) Obesity,     Psychiatric/Substance Use:     (+)  psychiatric history depression     Infectious Disease:  - neg infectious disease ROS     Malignancy:  - neg malignancy ROS     Other:      (+) , H/O Chronic Pain,        Physical Exam    Airway        Mallampati: II   TM distance: > 3 FB   Neck ROM: full   Mouth opening: > 3 cm    Respiratory Devices and Support         Dental           Cardiovascular   cardiovascular exam normal          Pulmonary   pulmonary exam normal            Other findings: Lab Test        11/27/20     07/31/20     10/25/17     09/14/17                       1408          1604          1319          1730          WBC          11.4*        9.7           --          19.6*         HGB          12.2         13.1         13.8         12.1          MCV          97           94            --          94            PLT          326          317           --          335            Lab Test        02/09/21 02/04/21 01/28/21                       1533          1003          1502          NA           136          136          138           POTASSIUM    3.6          3.6          3.7           CHLORIDE     107          108          108           CO2          23           24           24            BUN          8            8            6*            CR           0.53         0.56         0.59          ANIONGAP     6            4            6             DELFINO          9.5          9.2          9.4           GLC          103*         70           91              OUTSIDE LABS:  CBC:   Lab Results   Component Value Date    WBC 11.4 (H) 11/27/2020    WBC 9.7 07/31/2020    HGB 12.2 11/27/2020    HGB 13.1 07/31/2020    HCT 37.3 11/27/2020    HCT 39.4 07/31/2020     11/27/2020     07/31/2020     BMP:   Lab Results   Component Value Date     02/09/2021     02/04/2021    POTASSIUM 3.6 02/09/2021    POTASSIUM 3.6 02/04/2021    CHLORIDE 107 02/09/2021    CHLORIDE 108 02/04/2021    CO2 23 02/09/2021    CO2 24 02/04/2021    BUN 8  02/09/2021    BUN 8 02/04/2021    CR 0.53 02/09/2021    CR 0.56 02/04/2021     (H) 02/09/2021    GLC 70 02/04/2021     COAGS:   Lab Results   Component Value Date    INR 1.03 10/30/2012     POC:   Lab Results   Component Value Date    HCG Negative 03/15/2016     HEPATIC:   Lab Results   Component Value Date    ALBUMIN 2.6 (L) 02/09/2021    PROTTOTAL 6.6 (L) 02/09/2021    ALT 25 02/09/2021    AST 14 02/09/2021    ALKPHOS 187 (H) 02/09/2021    BILITOTAL 0.3 02/09/2021     OTHER:   Lab Results   Component Value Date    DELFINO 9.5 02/09/2021    LIPASE 101 09/14/2017    TSH 1.14 11/21/2015    T4 0.90 10/15/2012       Anesthesia Plan    ASA Status:  2      Anesthesia Type: Epidural.              Consents    Anesthesia Plan(s) and associated risks, benefits, and realistic alternatives discussed. Questions answered and patient/representative(s) expressed understanding.     - Discussed with:  Patient         Postoperative Care       PONV prophylaxis: Ondansetron (or other 5HT-3)     Comments:                Spencer Olivera MD

## 2021-02-11 NOTE — PROVIDER NOTIFICATION
02/10/21 7152   Provider Notification   Provider Name/Title Dr JASS Kim   Method of Notification At Bedside   Request Evaluate in Person;Attend Delivery   In house  Called in for delivery as pt was involuntary pushing.

## 2021-02-11 NOTE — PROVIDER NOTIFICATION
02/10/21 6669   Provider Notification   Provider Name/Title Dr. Montenegro   Method of Notification Phone   Request Evaluate - Remote   Notification Reason Status Update   Discuss with MD SVE quickly progressed from 4cm to 8cm. Requesting MD come for delivery. Will attempt to get epidural if possible per pt request.     MD states he will come now for delivery.

## 2021-02-11 NOTE — PLAN OF CARE
Data: [facility] presented to [facility]: 2/10/2021  8:59 PM.  Reason for maternal/fetal assessment is uterine contractions. Patient reports I'm having regular contractions every 5-6 min apart since 3 pm, my membranes was stripped in clinic yesterday, Patient is a .  Prenatal record reviewed. Pregnancy has been uncomplicated..  Gestational Age 39w1d. VSS. Fetal movement present. Patient denies leaking of vaginal fluid/rupture of membranes, vaginal bleeding, abdominal pain, pelvic pressure, nausea, vomiting, headache, visual disturbances, epigastric or URQ pain, significant edema. Support person her mom is present.   Action: Verbal consent for EFM. Triage assessment completed. Bill of rights reviewed.  Response: Patient verbalized agreement with plan. Will contact Dr Josh Montenegro with update and further orders.

## 2021-02-11 NOTE — TELEPHONE ENCOUNTER
"Patient is pregnant at 39 weeks, 1 day. Patient plans to deliver at [facility]. This is patient's second baby.     Had membranes stripped in clinic yesterday. Reports contractions since yesterday. Started timing the contractions today at 3 pm, states they are every 5-6 minutes, lasting 40-50 seconds. Denies bleeding or leakage of fluids. Advised to be seen in L&D per RN triage protocol.  Called [facility] L&D and spoke with charge nurse to notify them that patient is coming in.     Orly Waters, RN/[facility]    Reason for Disposition    [1] History of prior delivery (multipara) AND [2] contractions < 10 minutes apart AND [3] present 1 hour    Additional Information    Negative: Pregnant < 37 weeks (i.e., )    Negative: [1] Uncertain delivery date AND [2] possibly pregnant < 37 weeks (i.e., )    Negative: Severe bleeding (e.g., continuous red blood from vagina, or large blood clots)    Negative: Uncontrollable urge to push (i.e., feels like baby is coming out now)    Negative: [1] SEVERE abdominal pain (e.g., excruciating) AND [2] constant AND [3] present > 1 hour    Negative: Difficult to awaken or acting confused (e.g., disoriented, slurred speech)    Negative: Shock suspected (e.g., cold/pale/clammy skin, too weak to stand, low BP, rapid pulse)    Negative: Passed out (i.e., lost consciousness, collapsed and was not responding)    Negative: [1] First baby (primipara) AND [2] contractions < 6 minutes apart  AND [3] present 2 hours    Negative: Umbilical cord hanging out of the vagina (shiny, white, curled appearance, \"like telephone cord\")    Negative: Can see baby    Negative: Sounds like a life-threatening emergency to the triager    Protocols used: PREGNANCY - LABOR-A-AH      "

## 2021-02-11 NOTE — PROVIDER NOTIFICATION
02/11/21 0820   Provider Notification   Provider Name/Title Dr. Bueno   Method of Notification Electronic Page;Phone   Notification Reason Other (Comment)  (pt request to have vitamins ordered @[facility], uses as antidepress)

## 2021-02-11 NOTE — L&D DELIVERY NOTE
Delivery Date:  02/10/2021      The patient is a 35-year-old  3, para 2 female who has had her prenatal care through [facility].  She presented to the [facility] in labor and then once she experienced spontaneous rupture of membranes and experienced rapid progress to 10 cm dilated.  I was called to standby because the patient was unmedicated and had the urge to push and her attending physician was not present.      She ultimately pushed through 1 contraction for a spontaneous vaginal delivery over an intact perineum of a vigorous female infant with Apgars 8 and 9.  The placenta was delivered spontaneously and intact and was grossly unremarkable.  A second-degree midline laceration was repaired in layers with 3-0 Vicryl suture with local anesthesia administered.  Quantitative blood loss was 10 mL.  Both infant and mother did well following delivery.        Dr Montenegro, the attending, for [facility] presented after delivery was complete.         STEPHANIE DELUNA MD             D: 2021   T: 2021   MT: LEILANI      Name:     GARRISON LOZOYA   MRN:      [id number]        Account:        [id number]   :      1985        Delivery Date:  02/10/2021               Document: [id number]

## 2021-02-11 NOTE — PROVIDER NOTIFICATION
02/10/21 6530   Provider Notification   Provider Name/Title Dr Montenegro   Method of Notification Phone   Request Evaluate - Remote   Notification Reason Patient Arrived;Status Update;SVE;Other (Comment);Uterine Activity   Paged MD notified reg Pt arrival,GA,Conts are 3-4 min apart,FHT Category 1,SVE done same as was in clinic,Pt is in early labor and membranes was stripped yesterday as pt request.Pts had level 2 due AMA and BPP was 8/8 yesterday.will let her walk and re-evaluate in an hour and will update MD.Pt agreed with plan.

## 2021-02-11 NOTE — PLAN OF CARE
Data: Vital signs within normal limits. Postpartum checks within normal limits - see flow record. Patient eating and drinking normally. Patient able to empty bladder independently and is up ambulating. No apparent signs of infection. Patient performing self cares, is able to care for infant and is breast feeding every 2-3 hours.   Is using tylenol/ibuprofen for cramping discomfort.  Rested in bed this shift.  Action: Patient medicated during the shift for cramping. See MAR. Patient reassessed within 1 hour after each medication and pain was improved - patient stated she was comfortable. Patient education done, see flow record.  Response: Positive attachment behaviors observed with infant. Patient's support was not present this shift.   Plan: Continue current plan of care.  Anticipate discharge on 2/13/21.

## 2021-02-11 NOTE — PROGRESS NOTES
I was called to standby for delivery due to rapid progress of unmedicated multipara in labor.    GBS positive untreated    /IP of vigorous female infant with Apgars 8,9.    2nd degree lac repaired with local anesthesia    QBL 10cc

## 2021-02-11 NOTE — PROGRESS NOTES
PPD#1  Doing well. Pain well controlled on oral pain medication. Tolerating regular diet. Voiding well. Ambulating without difficulty. Lochia is scant. Breast feeding.   Vitals:    21 0100 21 0115 21 0130 21 0215   BP: 111/56 117/64 112/63 119/72   Pulse:       Resp:    16   Temp:    98  F (36.7  C)   TempSrc:       Weight:       Height:         General Appearance: NAD  Abdomen: Soft, NT, ND. Fundus firm at U-2  Extremities: NT, trace edema    Hemoglobin   Date Value Ref Range Status   2020 12.2 11.7 - 15.7 g/dL Final   2020 13.1 11.7 - 15.7 g/dL Final   ]    A/P: 35 year old  PPD#1 s/p . Hemodynamically stable.   -- routine postpartum cares  -- encouraged to ambulate  -- anticipate discharge home tomorrow given late delivery time for 24 hour discharge    Cornelio Bartlett MD  [facility]

## 2021-02-11 NOTE — PLAN OF CARE
Data:up to BR without any symptoms, voided spontaneously.[facility]aven care done independently.   Joann Ordaz transferred to 452 via wheelchair at 0215. Baby transferred via parent's arms.  Action: Receiving unit notified of transfer: Yes. Patient and family notified of room change.. Belongings sent to receiving unit. Accompanied by Registered Nurse. Oriented patient to surroundings. Safety checks and routine and what to expect during stay patient call light within reach.  Response: Patient tolerated transfer and is stable.Pt voiced understandings.

## 2021-02-11 NOTE — PLAN OF CARE
Data: Vital signs within normal limits. Postpartum checks within normal limits - see flow record. Patient eating and drinking normally. Patient able to empty bladder independently and is up ambulating. No apparent signs of infection.  laceration Patient performing self cares and is able to care for infant.  Action: Patient medicated during the shift for pain and cramping. See MAR. Patient reassessed within 1 hour after each medication and pain was improved - patient stated she was comfortable. Patient education done about routine cares. Room orientation and what to expect See flow record.  Response: Positive attachment behaviors observed with infant. Support persons is not  present.   Plan: Anticipate discharge on 2/12/21.

## 2021-02-12 ENCOUNTER — LACTATION ENCOUNTER (OUTPATIENT)
Age: 36
End: 2021-02-12

## 2021-02-12 VITALS
BODY MASS INDEX: 32.25 KG/M2 | SYSTOLIC BLOOD PRESSURE: 119 MMHG | DIASTOLIC BLOOD PRESSURE: 74 MMHG | RESPIRATION RATE: 16 BRPM | HEART RATE: 87 BPM | HEIGHT: 63 IN | TEMPERATURE: 97.9 F | WEIGHT: 182 LBS

## 2021-02-12 PROCEDURE — 250N000013 HC RX MED GY IP 250 OP 250 PS 637: Performed by: OBSTETRICS & GYNECOLOGY

## 2021-02-12 RX ADMIN — IBUPROFEN 800 MG: 800 TABLET ORAL at 08:24

## 2021-02-12 RX ADMIN — DOCUSATE SODIUM 50 MG AND SENNOSIDES 8.6 MG 1 TABLET: 8.6; 5 TABLET, FILM COATED ORAL at 08:25

## 2021-02-12 NOTE — DISCHARGE SUMMARY
DISCHARGE SUMMARY    Joann Ordaz  1985      Admission date:  2/10/2021  Discharge date: 2021    Admission diagnosis:   - Pregnancy at 39w1d  - Active labor    Discharge diagnosis:   - Status post vaginal delivery    Reason for Admission:   This is a 35 year old  39w1d who presented to Labor and Delivery and was admitted for active labor.  The patient's pregnancy had been  Uncomplicated other than AMA.    Hospital Course:   The patient was admitted.  Her labor course was very fast.  She ultimately underwent an unmedicated vaginal delivery. Baby's hospital course was uncomplicated.  The patient's postpartum course was uncomplicated. The patient is breast feeding.  She is meeting discharge criteria and desires to be discharged home today, PPD # 2.     Discharge Exam:    Vitals:    21 0841 21 1600 21 2237 21 0824   BP: 130/84 120/71  119/74   Pulse:   91 87   Resp: 16 16 16 16   Temp: 98.3  F (36.8  C) 98.1  F (36.7  C) 98.2  F (36.8  C) 97.9  F (36.6  C)   TempSrc: Oral Oral Oral Oral   Weight:       Height:           Constitutional: healthy, alert and no distress    Abdomen:  Uterine fundus is firm, non-tender and at the level of the umbilicus   Incision: C/D/I     LE:  no edema, non-tender      LABS:  Hemoglobin   Date Value Ref Range Status   2020 12.2 11.7 - 15.7 g/dL Final   2020 13.1 11.7 - 15.7 g/dL Final     No results found for: RUBELLAABIGG   Lab Results   Component Value Date    ABO A 2020           Lab Results   Component Value Date    RH Pos 2020                Discharge Medications:       Review of your medicines      CONTINUE these medicines which have NOT CHANGED      Dose / Directions   acetaminophen 325 MG tablet  Commonly known as: TYLENOL      Dose: 325-650 mg  Take 325-650 mg by mouth every 6 hours as needed for mild pain  Refills: 0     breast pump Misc  Used for: Breast feeding status of mother      Dose: 1 each  1 each  as needed (breast feeding)  Quantity: 1 each  Refills: 0     hydrocortisone (Perianal) 2.5 % cream  Commonly known as: hydrocortisone  Used for: External hemorrhoids      Place rectally 2 times daily as needed for hemorrhoids  Quantity: 70 g  Refills: 4     magnesium oxide 200 MG Tabs      Dose: 1 tablet  Take 1 tablet by mouth daily  Refills: 0     omeprazole 40 MG DR capsule  Commonly known as: priLOSEC  Used for: Gastroesophageal reflux disease with esophagitis without hemorrhage      Dose: 40 mg  Take 1 capsule (40 mg) by mouth daily  Quantity: 90 capsule  Refills: 3     PRENATAL GUMMIES/DHA & FA PO      Refills: 0     vitamin B-Complex      Dose: 1 tablet  Take 1 tablet by mouth daily  Refills: 0     vitamin D3 250 mcg (63768 units) capsule  Commonly known as: CHOLECALCIFEROL      Dose: 1 capsule  Take 1 capsule by mouth daily  Refills: 0        STOP taking    doxylamine 25 MG Tabs tablet  Commonly known as: UNISOM        metoclopramide 10 MG tablet  Commonly known as: REGLAN        ondansetron 4 MG ODT tab  Commonly known as: ZOFRAN-ODT        promethazine 25 MG suppository  Commonly known as: PHENERGAN               Patient Instructions:  Activity: the patient was instructed to have pelvic rest for 6 weeks.  Nothing in the vagina. No tampons, douching, or intercourse.  No driving while on narcotics.  Activity restrictions none otherwise.  She should notify her physician with temperature greater than 100.4 degrees, and if she is having any brisk vaginal bleeding where she soaks through greater than 1 pad per hour for more than 2 hours, if any area on her breast becomes red or painful, or if her pain is no longer controlled by pain medications.  Diet as tolerated.  Discussed symptoms of post-partum blues and depression and urged her to contact us immediately should that become a concern.    Follow-up with primary OB in 6 weeks for a postpartum visit.    Leticia Miller MD  February 12, 2021

## 2021-02-12 NOTE — LACTATION NOTE
"This note was copied from a baby's chart.  Lactation visit. Patient notes \"good latch and feeding\" but painful with a blister/bruise on her left upper nipple and right is cracked. She has been using Motherlove and hydrogels. Frenulum tight. Performed oral assessments and tongue exercises to try to \"lenthen\" tongue during latch. Reviewed ways to deepen latch, encouraged her to observe nipple after feedings to ensure deeper latch achieved, perform breast compression to keep  eating and not lose latch deep latch, and utilize hydrogel pads for healing and comfort of damaged nipples.   "

## 2021-02-12 NOTE — PLAN OF CARE
VSS. Pain well controlled - pt utilizing ibuprofen and tylenol as needed. Fundal check and bleeding WDL, using medicated pads on perineum with relief. Passing flatus,  no BM yet. Ambulating frequently and independently in room. Tolerating regular diet. Mother states nipples are quite sore, given hydrogel, lactation consult this a.m. Breastfeeding independently with no assistance from staff. No support person present this shift, pt spent a lot of time facetiming 3 yr old son. FOB to return in the morning.

## 2021-02-12 NOTE — DISCHARGE INSTRUCTIONS
Please follow-up with your OBGYN in 6 weeks       Postpartum Vaginal Delivery Instructions    Activity       Ask family and friends for help when you need it.    Do not place anything in your vagina for 6 weeks.    You are not restricted on other activities, but take it easy for a few weeks to allow your body to recover from delivery.  You are able to do any activities you feel up to that point.    No driving until you have stopped taking your pain medications (usually two weeks after delivery).     Call your health care provider if you have any of these symptoms:       Increased pain, swelling, redness, or fluid around your stiches from an episiotomy or perineal tear.    A fever above 100.4 F (38 C) with or without chills when placing a thermometer under your tongue.    You soak a sanitary pad with blood within 1 hour, or you see blood clots larger than a golf ball.    Bleeding that lasts more than 6 weeks.    Vaginal discharge that smells bad.    Severe pain, cramping or tenderness in your lower belly area.    A need to urinate more frequently (use the toilet more often), more urgently (use the toilet very quickly), or it burns when you urinate.    Nausea and vomiting.    Redness, swelling or pain around a vein in your leg.    Problems breastfeeding or a red or painful area on your breast.    Chest pain and cough or are gasping for air.    Problems coping with sadness, anxiety, or depression.  If you have any concerns about hurting yourself or the baby, call your provider immediately.     You have questions or concerns after you return home.     Keep your hands clean:  Always wash your hands before touching your perineal area and stitches.  This helps reduce your risk of infection.  If your hands aren't dirty, you may use an alcohol hand-rub to clean your hands. Keep your nails clean and short.

## 2021-02-12 NOTE — PLAN OF CARE
"  Data: Vital signs within normal limits. Postpartum checks within normal limits - see flow record. Patient eating and drinking normally. Patient able to empty bladder independently and is up ambulating. No apparent signs of infection. Episiotomy healing well. Using medicated pads. Patient performing self cares and is able to care for infant. Put on lactation list for tomorrow regarding sore nipples. Also spoke to Jensen with  and will do consult tomorrow re: support.  Action: Patient medicated during the shift for pain and cramping. See MAR. Patient reassessed within 1 hour after each medication and pain was improved - patient stated she was comfortable. Patient education done about feedings, self cares, ROP. See flow record.  Response: Positive attachment behaviors observed with infant. Support persons FOB showed up, per pt it had been \"months \" since last visit . Plan was to do ROP paperwork, FOB had been told on phone what to bring and at the time to sign he could not find his 's license. He states he will return tomorrow with a photo ID and car seat for baby. He did seem interested in the baby and brought food and coffee for [employer]. Same FOB for her 1st baby.   Plan: Anticipate discharge on 2/13 due to required 48 hr stay for untreated GBS.    Her mother was here for delivery and had 2nd band. When pt was able to finally reach FOB and he expressed interest in visiting- education was done re covid restrictions/one visitor . Mother now cannot return and FOB banded and pt/infant rebanded to match  "

## 2021-02-12 NOTE — PLAN OF CARE
Data: Vital signs within normal limits. Postpartum checks within normal limits - see flow record. Patient eating and drinking normally. Patient able to empty bladder independently and is up ambulating. No apparent signs of infection. Patient performing self cares and is able to care for infant.  Action: Patient medicated during the shift for pain and cramping. See MAR. Patient reassessed within 1 hour after each medication and pain was improved - patient stated she was comfortable. Patient discharge education completed. Infant remains patient until tomorrow AM. See flow record.  Response: Positive attachment behaviors observed with infant. Support person not present today, pt expecting him to arrive later this afternoon to sign ROP papers.    Plan: Discharge this afternoon, rooming in with infant.

## 2021-02-16 LAB
BILE AC SERPL-SCNC: 1 UMOL/L (ref 0–2.5)
CDCAE SERPL-SCNC: 0.7 UMOL/L (ref 0–3.4)
CHOLATE SERPL-SCNC: 2.4 UMOL/L (ref 0–7)
DO-CHOLATE SERPL-SCNC: 0.4 UMOL/L (ref 0–1.9)
URSODEOXYCHOLATE SERPL-SCNC: 0.3 UMOL/L (ref 0–1)

## 2021-02-21 ENCOUNTER — NURSE TRIAGE (OUTPATIENT)
Dept: NURSING | Facility: CLINIC | Age: 36
End: 2021-02-21

## 2021-02-21 NOTE — TELEPHONE ENCOUNTER
Post partum day 11, she started itching a few days ago and she looked down there and it is red and has a bad odor. Temp 102.5  Oral, 101.2 after ibuprofen ,99.8 oral after tylenol and Ibuprofen. No change in abdominal pain or bleeding.   Care advice is to go to the ED to be evaluated.    Neda Rosado RN/ [facility]        Reason for Disposition    Fever > 100.4 F (38.0 C)    Additional Information    Negative: Difficult to awaken or acting confused (e.g., disoriented, slurred speech)    Negative: Shock suspected (e.g., cold/pale/clammy skin, too weak to stand, low BP, rapid pulse)    Negative: [1] Difficulty breathing AND [2] bluish lips, tongue or face    Negative: [1] Rash with purple (or blood-colored) spots or dots AND [2] patient sounds sick or weak to the triager    Negative: Sounds like a life-threatening emergency to the triager    Foul smelling vaginal discharge (i.e., lochia)    Negative: Shock suspected (e.g., cold/pale/clammy skin, too weak to stand, low BP, rapid pulse)    Negative: Difficult to awaken or acting confused (e.g., disoriented, slurred speech)    Negative: Passed out (i.e., lost consciousness, collapsed and was not responding)    Negative: Sounds like a life-threatening emergency to the triager    Negative: SEVERE dizziness (e.g., unable to stand, requires support to walk, feels like passing out now)    Negative: [1] SEVERE abdominal pain AND [2] present > 1 hour    Protocols used: POSTPARTUM - VAGINAL BLEEDING AND LOCHIA-A-AH, POSTPARTUM - FEVER-A-AH

## 2021-02-22 ENCOUNTER — TELEPHONE (OUTPATIENT)
Dept: OBGYN | Facility: CLINIC | Age: 36
End: 2021-02-22

## 2021-02-22 NOTE — TELEPHONE ENCOUNTER
Patient calling:  Was seen in ER last night for mastitis, endometritis.  Started on Keflex, feeling 100% better this am.  No fever.    Was advised to contact office regarding follow up?  She doesn't feel the need at this time.  Advised to call if fever returns or develops any other symptoms.    Please advise.      Lidna Byrnes RN

## 2021-02-23 NOTE — TELEPHONE ENCOUNTER
Should follow up but ok if she declines.   Have her make appointment if she feels ill again.   Dr. Vianca Almeida, DO    Obstetrics and Gynecology  [facility] [place]

## 2021-03-24 ENCOUNTER — PRENATAL OFFICE VISIT (OUTPATIENT)
Dept: OBGYN | Facility: CLINIC | Age: 36
End: 2021-03-24
Payer: COMMERCIAL

## 2021-03-24 VITALS — BODY MASS INDEX: 31.11 KG/M2 | WEIGHT: 175.6 LBS | SYSTOLIC BLOOD PRESSURE: 116 MMHG | DIASTOLIC BLOOD PRESSURE: 64 MMHG

## 2021-03-24 PROCEDURE — 99207 PR POST PARTUM EXAM: CPT | Performed by: FAMILY MEDICINE

## 2021-03-24 NOTE — PATIENT INSTRUCTIONS
Return yearly     Call to schedule surgery     Dr. Vianca Almeida, DO    Obstetrics and Gynecology  [facility] [place] [facility] [place]

## 2021-03-24 NOTE — PROGRESS NOTES
SUBJECTIVE: Joann is here for a 6-week postpartum checkup.    Delivery date was 02/10/2021. She had a  of a viable girl, weight 6 pounds 8 oz., with none complications.  Since delivery, she has been breast feeding.  She has no signs of infection, bleeding or other complications, she is taking a supplement for clogged milk ducts.  She is not pregnant.  We discussed contraceptions and she has chosen ablation.  She  has not had intercourse since delivery and complains of No discomfort. Patient screened for postpartum depression and complaints are NEGATIVE. Screening has also been completed for intimate partner violence.    EXAM:  Today's Depression Rating was   PHQ-9 SCORE 2019   PHQ-9 Total Score MyChart 10 (Moderate depression)   PHQ-9 Total Score 10       GENERAL healthy, alert and no distress  EYES EOMI, intact visual fields, PERRL and funduscopic deferred  HENT: Normocephalic. TM's grossly normal, oropharynx without significant findings.  NECK: no adenopathy, no asymmetry, masses, or scars and thyroid normal to palpation  RESP: Clear to auscultation  BREASTS: NEGATIVE  CV: NEGATIVE  LYMPH: NEGATIVE  GI: aorta normal and bowel sounds normal  : no hernia detected  GYN PELVIC: NEGATIVE, normal external genitalia, normal groin lymphatics, normal urethral meatus, normal vaginal mucosa, normal cervix and normal adnexa, no masses or tenderness  MS: NEGATIVE, Extremities normal. No deformaties, edema or skin discoloration.  SKIN: no ulcers, lesions or rash  NEURO: alert/oriented to person, location and time, CN 2-12 intact, brisk, symmetric reflexes at knees and biceps b/l, strength 5/5 throughout and symmetric, sensation to light touch grossly intact throughout  PSYCH: NEGATIVE    Discussed risks and benefits of contraception options in detail including oral contraceptive pills, injection, IUD, ring and sterilization. Patient elects permanent sterilization.  No contraindications noted.       ASSESSMENT:    Normal postpartum exam after .  Would like treatment for endometriosis and menorrhagia   Discussed  IUD, endometrial ablation with L/S tubal remova, endometrial ablation with RA laparoscopic endometriosis resection/fulguration and bilateral salpingectomy, RA-Total laparoscopic hysterectomy with BS    Declines anything now for birth control.    Has painful letdown, treated with lecithin which is helping.  Could increase dose.     PLAN:  Return as needed or at time of next expected pap, pelvic, or breast exam.    Dr. Vianca Almeida, DO    Obstetrics and Gynecology  [facility] [place] and [place]

## 2021-03-24 NOTE — NURSING NOTE
"Chief Complaint   Patient presents with     Post Partum Exam      on 02/10/21, girl.Patient would like to discuss having an ablation. No other questions or concerns       Initial /64   Wt 79.7 kg (175 lb 9.6 oz)   LMP 2020 (Exact Date)   BMI 31.11 kg/m   Estimated body mass index is 31.11 kg/m  as calculated from the following:    Height as of 2/10/21: 1.6 m (5' 3\").    Weight as of this encounter: 79.7 kg (175 lb 9.6 oz).  BP completed using cuff size: regular    Questioned patient about current smoking habits.  Pt. has never smoked.          The following HM Due: NONE      Eulalio Leone CMA               "

## 2021-05-31 NOTE — PROGRESS NOTES
Chief Complaint   Patient presents with     Ear Fullness     was seen 8/4, stillhaving rocking sensation and ear fullness         HPI    Patient is here for over 2 wks of bilateral ear fullness and pain, with nasal congestion. She also reported having rocking sensation like her head is unsteady. NO fever, cough, nausea, vomiting. She has been taking Flonase, Sudafed and Dramamine.     ROS: Pertinent ROS noted in HPI.     Allergies   Allergen Reactions     Other Environmental Allergy        Patient Active Problem List   Diagnosis     Asthma     Endometriosis     Allergy To Pollens       No family history on file.    Social History     Socioeconomic History     Marital status: Single     Spouse name: Not on file     Number of children: Not on file     Years of education: Not on file     Highest education level: Not on file   Occupational History     Not on file   Social Needs     Financial resource strain: Not on file     Food insecurity:     Worry: Not on file     Inability: Not on file     Transportation needs:     Medical: Not on file     Non-medical: Not on file   Tobacco Use     Smoking status: Former Smoker     Smokeless tobacco: Never Used   Substance and Sexual Activity     Alcohol use: Not on file     Drug use: Not on file     Sexual activity: Not on file   Lifestyle     Physical activity:     Days per week: Not on file     Minutes per session: Not on file     Stress: Not on file   Relationships     Social connections:     Talks on phone: Not on file     Gets together: Not on file     Attends Taoist service: Not on file     Active member of club or organization: Not on file     Attends meetings of clubs or organizations: Not on file     Relationship status: Not on file     Intimate partner violence:     Fear of current or ex partner: Not on file     Emotionally abused: Not on file     Physically abused: Not on file     Forced sexual activity: Not on file   Other Topics Concern     Not on file   Social  History Narrative     Not on file         Objective:    Vitals:    08/14/19 1653   BP: 118/67   Pulse: 93   Resp: 14   Temp: 98.1  F (36.7  C)   SpO2: 97%       Gen:NAD  Throat: oropharynx clear, tonsils normal  Ears: Bilateral TMs dull and bulging, ear canals normal with minimal cerumen   Nose: No discharge  Eyes: normal conjunctiva, PERRLA, EOMI  Neck: No adenopathy  CV: RRR, normal S1S2, no M, R, G  Pulm: CTAB, normal effort        Acute bilateral otitis media  -     amoxicillin (AMOXIL) 875 MG tablet; Take 1 tablet (875 mg total) by mouth 2 (two) times a day for 10 days.

## 2021-05-31 NOTE — PATIENT INSTRUCTIONS - HE
You were seen today for sinus congestion and/or pain. This is likely due to a viral illness.    Symptoms management:  - May use Tylenol or Ibuprofen for discomfort and/or fever if present  - May try saline irrigation to relieve congestion (see instructions below)  - Use of nasal steroids (Flonase) as prescribed  - If you are experiencing ear fullness, may try an oral decongestant such as sudafed  - Continue to drink plenty of clear fluids    Reasons to come back for re-evaluation:  - Develop a fever of 100.4F or current fever worsens  - Sudden and severe pain in the face and head  - Troubles seeing or double vision  - Swelling or redness around one or both eyes  - Sfiff neck  - Symptoms have not improved after 7 days    Buffered normal saline nasal irrigation   The benefits   1. Saline (saltwater) washes the mucus and irritants from your nose.   2. The sinus passages are moisturized.   3. Studies have also shown that a nasal irrigation improves cell function (the cells that move the mucus work better).   The recipe   Use a one-quart glass jar that is thoroughly cleansed.   You may use a large medical syringe (30 cc), water pick with an irrigation tip (preferred method), squeeze bottle, or Neti pot. Do not use a baby bulb syringe. The syringe or pick should be sterilized frequently or replaced every two to three weeks to avoid contamination and infection.   Fill with water that has been distilled, previously boiled, or otherwise sterilized. Plain tap water is not recommended, because it is not necessarily sterile.   Add 1 to 1  heaping teaspoons of pickling/indu salt. Do not use table salt, because it contains a large number of additives.   Add 1 teaspoon of baking soda (pure bicarbonate).   Mix ingredients together, and store at room temperature. Discard after one week.   You may also make up a solution from premixed packets that are commercially prepared specifically for nasal irrigation.   The instructions    Irrigate your nose with saline one to two times per day.   If you have been told to use nasal medication, you should always use your saline solution first. The nasal medication is much more effective when sprayed onto clean nasal membranes, and the spray will reach deeper into the nose.   Pour the amount of fluid you plan to use into a clean bowl. Do not put your used syringe back into the storage container, because it contaminates your solution.   You may warm the solution slightly in the microwave, but be sure that the solution is not hot.   Bend over the sink (some people do this in the shower) and squirt the solution into each side of your nose, aiming the stream toward the back of your head, not the top of your head. The solution should flow into one nostril and out of the other, but it will not harm you if you swallow a little.   Some people experience a little burning sensation the first few times they use buffered saline solution, but this usually goes away after they adapt to it.

## 2021-05-31 NOTE — PROGRESS NOTES
Assessment:       1. Dizziness     2. Sinus congestion       Medical Decision Making  Patient presents with dizziness most consistent with motion sickness. She has history of motion sickness and has returned from a long boat trip 1 week ago. Her symptoms are improved with walking and movements and develop if she is lying still. No neurological symptoms and no signs of BPPV on exam. Turning her head does not trigger the dizziness. Patient also exhibits eustachian tube dysfunction on exam. This is likely exacerbating her dizziness symptoms. No history of fevers or erythema on exam to rule out otitis media and bacterial sinusitis at this time.       Plan:       Continue nasal steroids and sudafed.  OTC antihistmaines.  Fluids.  OTC dramamine as needed for dizziness.  Discussed signs of worsening symptoms and when to follow-up with PCP if no symptom improvement.      Patient Instructions   You were seen today for sinus congestion and/or pain. This is likely due to a viral illness.    Symptoms management:  - May use Tylenol or Ibuprofen for discomfort and/or fever if present  - May try saline irrigation to relieve congestion (see instructions below)  - Use of nasal steroids (Flonase) as prescribed  - If you are experiencing ear fullness, may try an oral decongestant such as sudafed  - Continue to drink plenty of clear fluids    Reasons to come back for re-evaluation:  - Develop a fever of 100.4F or current fever worsens  - Sudden and severe pain in the face and head  - Troubles seeing or double vision  - Swelling or redness around one or both eyes  - Sfiff neck  - Symptoms have not improved after 7 days    Buffered normal saline nasal irrigation   The benefits   1. Saline (saltwater) washes the mucus and irritants from your nose.   2. The sinus passages are moisturized.   3. Studies have also shown that a nasal irrigation improves cell function (the cells that move the mucus work better).   The recipe   Use a one-quart  glass jar that is thoroughly cleansed.   You may use a large medical syringe (30 cc), water pick with an irrigation tip (preferred method), squeeze bottle, or Neti pot. Do not use a baby bulb syringe. The syringe or pick should be sterilized frequently or replaced every two to three weeks to avoid contamination and infection.   Fill with water that has been distilled, previously boiled, or otherwise sterilized. Plain tap water is not recommended, because it is not necessarily sterile.   Add 1 to 1  heaping teaspoons of pickling/indu salt. Do not use table salt, because it contains a large number of additives.   Add 1 teaspoon of baking soda (pure bicarbonate).   Mix ingredients together, and store at room temperature. Discard after one week.   You may also make up a solution from premixed packets that are commercially prepared specifically for nasal irrigation.   The instructions   Irrigate your nose with saline one to two times per day.   If you have been told to use nasal medication, you should always use your saline solution first. The nasal medication is much more effective when sprayed onto clean nasal membranes, and the spray will reach deeper into the nose.   Pour the amount of fluid you plan to use into a clean bowl. Do not put your used syringe back into the storage container, because it contaminates your solution.   You may warm the solution slightly in the microwave, but be sure that the solution is not hot.   Bend over the sink (some people do this in the shower) and squirt the solution into each side of your nose, aiming the stream toward the back of your head, not the top of your head. The solution should flow into one nostril and out of the other, but it will not harm you if you swallow a little.   Some people experience a little burning sensation the first few times they use buffered saline solution, but this usually goes away after they adapt to it.         Subjective:       Joann Patterson is a  34 y.o. female here for evaluation of dizziness. Onset was 8 days ago. Symptoms have been unchanged since then. Patient reports being on a house boat for several days. Her symptoms began when she returned from this trip. She does have history of sea/motion sickness. Dizziness is only present if she remains sitting still. Dizziness improves with walking and movement. Associated symptoms include the sensation of water in the ears. She denies headaches, fevers, nausea, vomiting, vision changes, numbness, tingling, and muscle weakness. Tried taking allegra, Flonase, sudafed, meclizine, and hydrogen peroxide ear drops with no relief. Patient normally will take dramamine for her motion sickness with good relief, but has not tried this for her current symptoms.    The following portions of the patient's history were reviewed and updated as appropriate: allergies, current medications and problem list.    Review of Systems  A 12 point comprehensive review of systems was negative except as noted.     Allergies  Allergies   Allergen Reactions     Other Environmental Allergy      No family history on file.    Social History     Socioeconomic History     Marital status: Single     Spouse name: None     Number of children: None     Years of education: None     Highest education level: None   Occupational History     None   Social Needs     Financial resource strain: None     Food insecurity:     Worry: None     Inability: None     Transportation needs:     Medical: None     Non-medical: None   Tobacco Use     Smoking status: Former Smoker     Smokeless tobacco: Never Used   Substance and Sexual Activity     Alcohol use: None     Drug use: None     Sexual activity: None   Lifestyle     Physical activity:     Days per week: None     Minutes per session: None     Stress: None   Relationships     Social connections:     Talks on phone: None     Gets together: None     Attends Ca[employer] service: None     Active member of club or  organization: None     Attends meetings of clubs or organizations: None     Relationship status: None     Intimate partner violence:     Fear of current or ex partner: None     Emotionally abused: None     Physically abused: None     Forced sexual activity: None   Other Topics Concern     None   Social History Narrative     None         Objective:       /73 (Patient Site: Right Arm, Patient Position: Sitting, Cuff Size: Adult Regular)   Pulse (!) 104   Temp 98.6  F (37  C) (Oral)   Wt 182 lb 1.6 oz (82.6 kg)   SpO2 97%   General appearance: alert, appears stated age, cooperative, no distress and non-toxic  Head: Normocephalic, without obvious abnormality, atraumatic, sinuses nontender to percussion   Eyes: PERRL, no nystagmus  Ears: TM's intact with mucoid fluid and bulging, no erythema; external ears normal  Nose: no discharge  Throat: lips, mucosa, and tongue normal; teeth and gums normal  Neck: no adenopathy and supple, symmetrical, trachea midline  Lungs: clear to auscultation bilaterally  Heart: regular rate and rhythm, S1, S2 normal, no murmur, click, rub or gallop

## 2021-06-03 VITALS — WEIGHT: 182.1 LBS | BODY MASS INDEX: 32.26 KG/M2

## 2021-06-03 VITALS — BODY MASS INDEX: 32.06 KG/M2 | WEIGHT: 181 LBS

## 2021-06-03 VITALS — BODY MASS INDEX: 31.18 KG/M2 | WEIGHT: 176 LBS

## 2021-06-05 VITALS — BODY MASS INDEX: 31.18 KG/M2 | HEIGHT: 63 IN | WEIGHT: 176 LBS

## 2021-06-11 ENCOUNTER — VIRTUAL VISIT (OUTPATIENT)
Dept: OBGYN | Facility: CLINIC | Age: 36
End: 2021-06-11
Payer: COMMERCIAL

## 2021-06-11 PROCEDURE — 99213 OFFICE O/P EST LOW 20 MIN: CPT | Mod: 95 | Performed by: OBSTETRICS & GYNECOLOGY

## 2021-06-11 RX ORDER — SERTRALINE HYDROCHLORIDE 25 MG/1
25 TABLET, FILM COATED ORAL DAILY
Qty: 30 TABLET | Refills: 3 | Status: SHIPPED | OUTPATIENT
Start: 2021-06-11 | End: 2022-12-28

## 2021-06-11 NOTE — PROGRESS NOTES
Joann Ordaz is a 35 year old female who is being evaluated via a billable telephone visit.      What phone number would you like to be contacted at? [phone]  How would you like to obtain your AVS? Randallb[place]man      Joann Ordaz is a 35 year old female who presents for virtual visit today for the following health issue(s):  Patient presents with:  Depression: also anxiety      Additional information:PHQ 9 done this AM Score 13   Elke De La Garza CMA    Called the patient and spoke to her regarding her symptoms.  She has a history of depression, which she has managed in the past with a combination of therapy and supplements.  She is starting to feel that this is not enough for her.  She has begun at times to have some intrusive thoughts.  She is finding things very difficult to manage, and says that her support system is fairly minimal.  She does check in with her mom at least once a week and talks to her sister once or twice a week as well.  However, they do not live with her and they are not involved in the day-to-day care of her 2 children.  She does not have any suicidal ideation, though she states that she has struggled with this in the past.  In fact, the reason she has been hesitant to think about starting medications in the past is because she is worried about an increased risk of suicidal ideation in people who start antidepressants.    We discussed her concerns in detail.  She is crying most days, and states that it is really difficult for her to manage things at times.  She does feel that she is able to take care of the children and herself and feels safe at home.  She does not have thoughts of harming herself.  After long discussion of options, she is opting to start Zoloft 25 mg daily.  We discussed risks, benefits, alternatives, and common and uncommon side effects.  Because of her high level of concern, I think it would make sense for her to start this now and take it over the weekend, and then  follow-up with her primary obstetrician Dr. Almeida next week for recheck and to see how she is doing.  She easily contracts for safety, stating that if she feels that things are getting significantly worse, she would present to the emergency room for evaluation.  We also discussed other resources in the community such as the  mental health program at [facility].  She is plugged in with the therapist, though she states she is trying to find someone currently who specializes in postpartum depression.  She has a follow-up scheduled with her current therapist this upcoming week as well, and has decided to keep that until she is established with a new therapist.  She consents to a follow-up call with Dr. Diaz's next week, and so a message was sent to the scheduling pool to help arrange that at a time that works for her, as she has returned to work part-time.    Phone time: 17 mins

## 2021-06-14 NOTE — PROGRESS NOTES
Pt discharge 4 hours post fall. FHT cat 1 and pt denies LOF, VB or UC. Pt reports + FM and verbalized understanding of return precautions. Pt will follow up in clinic on Tuesday, December 29th.     DARWIN NASSAR

## 2021-06-14 NOTE — PROGRESS NOTES
"[facility] OB Triage    Subjective: Joann Ordaz is a  35 y.o. female at 32w5d with a current prenatal history significant for advanced maternal age and cholestasis of pregnancy in prior pregnancy who presents to OB triage with pain in both ankles and right lower quadrant after falling on her gravid abdomen. She fell this afternoon and then came into the ED for assessment. She said that she hurt her abdomen and is having some tightening across her belly, but it feels like her round ligament. She hasn't had any contractions, vaginal bleeding, nor leakage of fluid. Fetal Movement: normal.    She has pain in both of her ankles, however her right is worse than her left. She has broken her left foot and sprained her right a number of times. She still has boots for them at home. She says that her right foots is worse than her left. She has pain shooting on the lateral aspect of her right leg to her ankle and down her foot. She has had some swelling in her right ankle. She has no redness nor warmth of either joint.    Estimated Date of Delivery: 21 No LMP recorded. Patient is pregnant.  OB provider: Provider, No Primary Care  OB History        3    Para   2    Term                AB        Living           SAB        TAB        Ectopic        Multiple        Live Births                     Objective:   Pulse 94, temperature 98.3  F (36.8  C), temperature source Oral, resp. rate 18, height 5' 3\" (1.6 m), weight 176 lb (79.8 kg), not currently breastfeeding.  General:   alert, appears stated age and cooperative   Skin:   normal   HEENT:  extra ocular movement intact   Lungs:   Breathing comfortably   Heart:   Dorsalis pedis intact bilaterally   Extremities: Warm, dry and well perfused. No edema. Tenderness to palpation anterior and posterior to lateral malleolus of right ankle/foot, no tenderness of medial ankle/foot bilaterally. No redness, swelling, warmth noted, however ice packs " place on right ankle prior to exam.   Abdomen: FHT present   Membranes intact   Aragon:  None   FHT: Baseline: 144 bpm, Variability: Moderate (6 - 25 bpm), Accelerations: Present and Decelerations: Absent   Presentation: unsure     Badger Ankle Rules (left): no pain on malleolus nor midfoot so x-ray series not indicated.  Badger Ankle Rules (right): pain on malleolus and midfoot, so x-ray series indicated.    Laboratory Studies:   Results for orders placed or performed during the hospital encounter of 20   Basic Metabolic Panel   Result Value Ref Range    Sodium 139 136 - 145 mmol/L    Potassium 3.3 (L) 3.5 - 5.0 mmol/L    Chloride 110 (H) 98 - 107 mmol/L    CO2 21 (L) 22 - 31 mmol/L    Anion Gap, Calculation 8 5 - 18 mmol/L    Glucose 100 70 - 125 mg/dL    Calcium 8.3 (L) 8.5 - 10.5 mg/dL    BUN 5 (L) 8 - 22 mg/dL    Creatinine 0.55 (L) 0.60 - 1.10 mg/dL    GFR MDRD Af Amer >60 >60 mL/min/1.73m2    GFR MDRD Non Af Amer >60 >60 mL/min/1.73m2        ASSESSMENT AND PLAN: Joann Ordaz is a  35 y.o. female who presented to [facility] at 32w5d on 2020 with abdominal pain, bilateral ankle pain, and tenderness of the right ankle. She is being watched for 4 hours for falling on abdomen while pregnant.    1. Not in labor.  2. Traumatic fall in pregnancy - watch on FHT for 4 hours, if no contractions or signs of fetal distress can discharge home. If contractions or fetal distress watch for 24 hours.  3. Right ankle pain - foot and ankle x-rays show no good evidence for acute fracture. Follow-up X-donnie in 7-10 days could be obtained if needed for fracture rule out.  4. Left ankle pain - Badger ankle rules rules out fracture     Patient discussed with attending physician, , who agrees with the plan.   Patient signed out to oncoming resident, Dr. Freeman, who will follow up on ankle x-rays and obstetric monitoring.     Nirmala Childers MD PGY3 2020  [facility]

## 2021-06-16 ENCOUNTER — VIRTUAL VISIT (OUTPATIENT)
Dept: OBGYN | Facility: CLINIC | Age: 36
End: 2021-06-16
Payer: COMMERCIAL

## 2021-06-16 VITALS — WEIGHT: 170 LBS | BODY MASS INDEX: 30.11 KG/M2

## 2021-06-16 DIAGNOSIS — F32.5 MAJOR DEPRESSION IN COMPLETE REMISSION (H): ICD-10-CM

## 2021-06-16 PROCEDURE — 99442 PR PHYSICIAN TELEPHONE EVALUATION 11-20 MIN: CPT | Performed by: FAMILY MEDICINE

## 2021-06-16 ASSESSMENT — ANXIETY QUESTIONNAIRES
GAD7 TOTAL SCORE: 9
7. FEELING AFRAID AS IF SOMETHING AWFUL MIGHT HAPPEN: SEVERAL DAYS
6. BECOMING EASILY ANNOYED OR IRRITABLE: MORE THAN HALF THE DAYS
5. BEING SO RESTLESS THAT IT IS HARD TO SIT STILL: SEVERAL DAYS
2. NOT BEING ABLE TO STOP OR CONTROL WORRYING: SEVERAL DAYS
1. FEELING NERVOUS, ANXIOUS, OR ON EDGE: MORE THAN HALF THE DAYS
IF YOU CHECKED OFF ANY PROBLEMS ON THIS QUESTIONNAIRE, HOW DIFFICULT HAVE THESE PROBLEMS MADE IT FOR YOU TO DO YOUR WORK, TAKE CARE OF THINGS AT HOME, OR GET ALONG WITH OTHER PEOPLE: SOMEWHAT DIFFICULT
3. WORRYING TOO MUCH ABOUT DIFFERENT THINGS: SEVERAL DAYS

## 2021-06-16 ASSESSMENT — PATIENT HEALTH QUESTIONNAIRE - PHQ9
SUM OF ALL RESPONSES TO PHQ QUESTIONS 1-9: 10
5. POOR APPETITE OR OVEREATING: SEVERAL DAYS

## 2021-06-16 NOTE — PROGRESS NOTES
"Joann Ordaz is a 35 year old female who is being evaluated via a billable telephone visit.      What phone number would you like to be contacted at? [phone]  How would you like to obtain your AVS? Kianna      Joann Ordaz is a 35 year old female who presents for virtual visit today for the following health issue(s):  Patient presents with:  RECHECK: started Zoloft and can't tell a difference other than headache and nausea after eating--stated today is day      Additional information: pt stated she \"feels like her brain is not working correctly--trouble focusing and feeling restless\"            "

## 2021-06-16 NOTE — PROGRESS NOTES
SUBJECTIVE:  Joann Ordaz is an 35 year old   woman who presents for telephone visit for possible post partum depression,   PHQ9: 13, started on zoloft 2021. Checking on the baby a lot and having anxious scenarios roll through her mind.    Having some intrusive thoughts about the baby, such as not protecting the baby.  So then will go and check on the baby   Over and over again.        Past Medical History:   Diagnosis Date     2016     Asthma     No longer uses inhaler, no Hosp     Chronic pain     Abdominal pain with mensturation.     Depressive disorder      Endometriosis 2008     Other chronic pain     endometriosis     PONV (postoperative nausea and vomiting)           Family History   Problem Relation Age of Onset     Asthma Mother      Allergies Mother      Hypertension Father      Cardiovascular Father         high cholesterol     Neurologic Disorder Maternal Grandmother         dementia     Breast Cancer Maternal Aunt 52        Stage 3--bilateral masectomy     Cancer Maternal Uncle 51        throat cancer       Past Surgical History:   Procedure Laterality Date     DAVINCI PELVIC PROCEDURE  2012    Procedure: DAVINCI PELVIC PROCEDURE;  Davinci Assisted Laparoscopic Endometriosis Ressection, chromotubation;  Surgeon: Vianca Almeida DO;  Location: [facility] OR     DAVINCI PELVIC PROCEDURE Right 3/15/2016    Procedure: DAVINCI PELVIC PROCEDURE;  Surgeon: Vianca Almeida DO;  Location: [facility] OR     ENT SURGERY  2010    left nasal suction catarization & blood vessel removal     GYN SURGERY  2009    laparoscopic with CO2 laser     HEAD & NECK SURGERY  2005    wisdom teeth extraction     nexplanon  8/20/15       Current Outpatient Medications   Medication     acetaminophen (TYLENOL) 325 MG tablet     magnesium oxide 200 MG TABS     MEDS UNK - RECORDS REQUESTED     Prenatal MV-Min-FA-Omega-3 (PRENATAL GUMMIES/DHA & FA PO)     sertraline (ZOLOFT) 25 MG tablet     vitamin B-Complex      vitamin D3 (CHOLECALCIFEROL) 25 mcg (25189 units) capsule     Misc. Devices (BREAST PUMP) MISC     No current facility-administered medications for this visit.      Allergies   Allergen Reactions     Seasonal Allergies        Social History     Tobacco Use     Smoking status: Former Smoker     Packs/day: 0.25     Years: 10.00     Pack years: 2.50     Types: Cigarettes     Quit date: 2012     Years since quittin.7     Smokeless tobacco: Never Used   Substance Use Topics     Alcohol use: Not Currently     Alcohol/week: 0.0 standard drinks       Review Of Systems  Ears/Nose/Throat: negative  Respiratory: No shortness of breath, dyspnea on exertion, cough, or hemoptysis  Cardiovascular: negative  Gastrointestinal: negative  Genitourinary: See HPI   Constitutional, HEENT, cardiovascular, pulmonary, GI, , musculoskeletal, neuro, skin, endocrine and psych systems are negative, except as otherwise noted.      OBJECTIVE:  Telephone visit     ASSESSMENT:  Joann Ordaz is an 35 year old   woman who presents for telephone visit for possible post partum depression,   PHQ9: 13, started on zoloft 2021. Checking on the baby a lot and having anxious scenarios roll through her mind.    Having some intrusive thoughts about the baby, such as not protecting the baby.  So then will go and check on the baby   Over and over again.      PLAN:  Dx: anxiety and depression post partum   1)  Anxiety/depression: started on zoloft Friday, has some nausea, no increase in mood.   The new baby is fussier than her first baby.  Ok to continue the zoloft @ current dose.   She should expect a change in mood for the better in 3-4 weeks.  Plans to see therapist   When available.  The one she wants to see is busy until July. Info given for [place] psychologist.   Reviewed resources, ER, and calling our clinic should she worsen on the medicine.      2) Breastfeeding/pumping, going well.       3) Make telephone visit for  follow-up 1-2 weeks.     Labs were reviewed in [facility]   Imaging was reviewed in Epic   Tests and documents were reviewed.   Discussion of management or test interpretation     15 minutes spent on the date of the encounter doing chart review, interpretation of tests, patient visit, documentation and discussion with other provider(s)        Dr. Vianca Almeida, DO    OB/GYN   [facility]

## 2021-06-17 ASSESSMENT — ANXIETY QUESTIONNAIRES: GAD7 TOTAL SCORE: 9

## 2021-06-17 NOTE — PATIENT INSTRUCTIONS - HE
Patient Instructions by Spencer Tomas PA-C at 8/24/2019  8:20 AM     Author: Spencer Tomas PA-C Service: -- Author Type: Physician Assistant    Filed: 8/24/2019  8:49 AM Encounter Date: 8/24/2019 Status: Addendum    : Spencer Tomas PA-C (Physician Assistant)    Related Notes: Original Note by Spencer Tomas PA-C (Physician Assistant) filed at 8/24/2019  8:48 AM       Continue using the Flonase nasal spray.  You may use over-the-counter Zyrtec to help with congestion  Take the Antivert as needed.  Referral to PT/OT for vestibular exercises to help with dizziness.  Return to walk-in care or ER if any new symptoms or concerns arise.      Patient Education     Benign Paroxysmal Positional Vertigo     Your health care provider may move your head in certain ways to treat your BPPV.     Benign paroxysmal positional vertigo (BPPV) is a problem with the inner ear. The inner ear contains the vestibular system. This system is what helps you keep your balance. BPPV causes a feeling of spinning. It is a common problem of the vestibular system.  Understanding the vestibular system  The vestibular system of the ear is made up of very tiny parts. They include the utricle, saccule, and semicircular canals. The utricle is a tiny organ that contains calcium crystals. In some people, the crystals can move into the semicircular canals. When this happens, the system no longer works as it should. This causes BPPV. Benign means it is not life threatening. Paroxysmal means it happens suddenly. Positional means that it happens when you move your head. Vertigo is a feeling of spinning.  What causes BPPV?  Causes include injury to your head or neck. Other problems with the vestibular system may cause BPPV. In many people, the cause of BPPV is not known.  Symptoms of BPPV  You many have repeated feelings of spinning (vertigo). The vertigo usually lasts less than 1 minute. Some movements, such as rolling over in bed, can bring on  vertigo.  Diagnosing BPPV  Your primary healthcare provider may diagnose and treat your BPPV. Or you may see an ear, nose, and throat doctor (otolaryngologist). In some cases, you may see a nervous system doctor (neurologist).  The healthcare provider will ask about your symptoms and your medical history. He or she will examine you. You may have hearing and balance tests. As part of the exam, your healthcare provider may have you move your head and body in certain ways. If you have BPPV, the movements can bring on vertigo. Your provider will also look for abnormal movements of your eyes. You may have other tests to check your vestibular or nervous systems.  Treatment for BPPV  Your healthcare provider may try to move the calcium crystals. This is done by having you move your head and neck in certain ways. This treatment is safe and often works well. You may also be told to do these movements at home. You may still have vertigo for a few weeks. Your healthcare provider will recheck your symptoms, usually in about a month. Special physical therapy may also be part of treatment. In rare cases, surgery may be needed for BPPV that does not go away.     When to call the healthcare provider  Call your healthcare provider right away if you have any of these:    Symptoms that do not go away with treatment    Symptoms that get worse    New symptoms   Date Last Reviewed: 5/1/2017 2000-2017 [employer]. [address] [place] [address], [place] [address]. All rights reserved. This information is not intended as a substitute for professional medical care. Always follow your healthcare professional's instructions.           Patient Education     Managing Dizziness (Vertigo) with Medicines    Although medicines can't cure your problem, they can help control symptoms. Your doctor may prescribe medicines for a few weeks and then taper them off. Always take your medicine as prescribed. Never share your medicine with  others.  Contact your healthcare provider right away if you have side effects from your medicines.   How medicines can help    Treat infection or inflammation. If you have an infection caused by bacteria, your doctor can prescribe antibiotics.    Limit conflicting balance signals. These medicines are often in pill form.    Ease nausea. Suppositories, pills, or shots can reduce vomiting.    Reduce pressure in the canals. Diuretics can be used to treat Meniere's disease. These medicines help your body get rid of extra fluid.    Ease other symptoms. Other medicines can help ease depression and anxiety caused by living with dizziness or fainting.  Date Last Reviewed: 11/1/2016 2000-2017 [employer]. [address] [place] [address], [place] [address]. All rights reserved. This information is not intended as a substitute for professional medical care. Always follow your healthcare professional's instructions.

## 2021-06-27 NOTE — PROGRESS NOTES
Progress Notes by Spencer Tomas PA-C at 8/24/2019  8:20 AM     Author: Spencer Tomas PA-C Service: -- Author Type: Physician Assistant    Filed: 8/25/2019 12:32 PM Encounter Date: 8/24/2019 Status: Signed    : Spencer Tomas PA-C (Physician Assistant)       Subjective:      Patient ID: Joann Patterson is a 34 y.o. female.    Chief Complaint:    HPI  Joann Patterson is a 34 y.o. female who presents today complaining of  potential dizziness.  Patient states over the last 4 weeks she has had intermittent dizziness.  She has been seen 2 times before for this.  She feels that she has a ear fullness.  She was seen initially on 8/4/2019 and diagnosed with dizziness and sinus congestion.  She was treated with nasal steroids and Sudafed over-the-counter antihistamines fluids.  She then return to clinic on 8/14/2019 for complaints of ear fullness again.  She was diagnosed with acute bilateral otitis media and placed on amoxicillin 875 twice a day for 10 days.  Patient states that she completed that course just yesterday.    Patient is returning to clinic today stating that she has paroxysms or bouts of the dizziness that is made worse with change of position and turning her head.  However, does not have difficulty with cardiac symptoms to include chest pain arm pain jaw pain diaphoresis nausea or vomiting.  No focal neurologic deficits or weakness to report.  No syncope presyncope or mentation deficits.    No past medical history on file.    No past surgical history on file.    No family history on file.    Social History     Tobacco Use   ? Smoking status: Former Smoker   ? Smokeless tobacco: Never Used   Substance Use Topics   ? Alcohol use: Not on file   ? Drug use: Not on file       Review of Systems  As above in HPI, otherwise balance of Review of Systems are negative.    Objective:     /66   Pulse 98   Temp 98.3  F (36.8  C) (Oral)   Wt 176 lb (79.8 kg)   LMP 07/27/2019   SpO2 97%     Physical  Exam  General: Patient is resting comfortably no acute distress is afebrile  HEENT: Head is normocephalic atraumatic   eyes are PERRL EOMI sclera anicteric no noted nystagmus both direct and consensual stimulation to light.  TMs are with some fluid in the middle ear bilaterally but there is no erythema or effusion..  Throat is clear  No cervical lymphadenopathy present  LUNGS: Clear to auscultation bilaterally  HEART: Regular rate and rhythm  Skin: Without rash non-diaphoretic  Neuro: Romberg is negative.  Extremity strength is 5 out of 5 and equal bilaterally      Assessment:     Procedures    The encounter diagnosis was Benign paroxysmal positional vertigo due to bilateral vestibular disorder.    Plan:     1. Benign paroxysmal positional vertigo due to bilateral vestibular disorder  meclizine (ANTIVERT) 12.5 mg tablet    Ambulatory referral to PT/OT       Had a conversation with the patient stating that I do think she has BPPV due to her bouts of dizziness that are made worse with change of position moving the head.  Prescription for Antivert to help with the dizziness.  Also referral to physical therapy for vestibular exercises to help with dizziness.  She will continue with her previous treatment with Flonase to help with eustachian tube dysfunction.  She may return to follow-up with her primary care provider if not getting good resolution of new symptoms or concerns arise.    Patient Instructions     Continue using the Flonase nasal spray.  You may use over-the-counter Zyrtec to help with congestion  Take the Antivert as needed.  Referral to PT/OT for vestibular exercises to help with dizziness.  Return to walk-in care or ER if any new symptoms or concerns arise.      Patient Education     Benign Paroxysmal Positional Vertigo     Your health care provider may move your head in certain ways to treat your BPPV.     Benign paroxysmal positional vertigo (BPPV) is a problem with the inner ear. The inner ear contains  the vestibular system. This system is what helps you keep your balance. BPPV causes a feeling of spinning. It is a common problem of the vestibular system.  Understanding the vestibular system  The vestibular system of the ear is made up of very tiny parts. They include the utricle, saccule, and semicircular canals. The utricle is a tiny organ that contains calcium crystals. In some people, the crystals can move into the semicircular canals. When this happens, the system no longer works as it should. This causes BPPV. Benign means it is not life threatening. Paroxysmal means it happens suddenly. Positional means that it happens when you move your head. Vertigo is a feeling of spinning.  What causes BPPV?  Causes include injury to your head or neck. Other problems with the vestibular system may cause BPPV. In many people, the cause of BPPV is not known.  Symptoms of BPPV  You many have repeated feelings of spinning (vertigo). The vertigo usually lasts less than 1 minute. Some movements, such as rolling over in bed, can bring on vertigo.  Diagnosing BPPV  Your primary healthcare provider may diagnose and treat your BPPV. Or you may see an ear, nose, and throat doctor (otolaryngologist). In some cases, you may see a nervous system doctor (neurologist).  The healthcare provider will ask about your symptoms and your medical history. He or she will examine you. You may have hearing and balance tests. As part of the exam, your healthcare provider may have you move your head and body in certain ways. If you have BPPV, the movements can bring on vertigo. Your provider will also look for abnormal movements of your eyes. You may have other tests to check your vestibular or nervous systems.  Treatment for BPPV  Your healthcare provider may try to move the calcium crystals. This is done by having you move your head and neck in certain ways. This treatment is safe and often works well. You may also be told to do these movements  at home. You may still have vertigo for a few weeks. Your healthcare provider will recheck your symptoms, usually in about a month. Special physical therapy may also be part of treatment. In rare cases, surgery may be needed for BPPV that does not go away.     When to call the healthcare provider  Call your healthcare provider right away if you have any of these:    Symptoms that do not go away with treatment    Symptoms that get worse    New symptoms   Date Last Reviewed: 5/1/2017 2000-2017 [employer]. [address] [place] [address]. All rights reserved. This information is not intended as a substitute for professional medical care. Always follow your healthcare professional's instructions.           Patient Education     Managing Dizziness (Vertigo) with Medicines    Although medicines can't cure your problem, they can help control symptoms. Your doctor may prescribe medicines for a few weeks and then taper them off. Always take your medicine as prescribed. Never share your medicine with others.  Contact your healthcare provider right away if you have side effects from your medicines.   How medicines can help    Treat infection or inflammation. If you have an infection caused by bacteria, your doctor can prescribe antibiotics.    Limit conflicting balance signals. These medicines are often in pill form.    Ease nausea. Suppositories, pills, or shots can reduce vomiting.    Reduce pressure in the canals. Diuretics can be used to treat Meniere's disease. These medicines help your body get rid of extra fluid.    Ease other symptoms. Other medicines can help ease depression and anxiety caused by living with dizziness or fainting.  Date Last Reviewed: 11/1/2016 2000-2017 [employer]. [address] [place] [address]. All rights reserved. This information is not intended as a substitute for professional medical care. Always follow your healthcare professional's  instructions.

## 2021-08-26 ENCOUNTER — OFFICE VISIT (OUTPATIENT)
Dept: OBGYN | Facility: CLINIC | Age: 36
End: 2021-08-26
Payer: COMMERCIAL

## 2021-08-26 VITALS
HEIGHT: 63 IN | SYSTOLIC BLOOD PRESSURE: 118 MMHG | BODY MASS INDEX: 32.43 KG/M2 | DIASTOLIC BLOOD PRESSURE: 78 MMHG | WEIGHT: 183 LBS

## 2021-08-26 DIAGNOSIS — N63.20 LEFT BREAST LUMP: ICD-10-CM

## 2021-08-26 DIAGNOSIS — L72.3 SEBACEOUS CYST: Primary | ICD-10-CM

## 2021-08-26 PROCEDURE — 99212 OFFICE O/P EST SF 10 MIN: CPT | Performed by: FAMILY MEDICINE

## 2021-08-26 ASSESSMENT — MIFFLIN-ST. JEOR: SCORE: 1489.21

## 2021-08-26 NOTE — PROGRESS NOTES
SUBJECTIVE:  Joann Ordaz is an 36 year old   woman who presents for   gynecology consult for left breast lump.  No LMP recorded.     Family history of uterine or ovarian cancer: No  History of abnormal mammogram: No  Family history of breast cancer: No    Concerns today:   Breast lump since 2021      Past Medical History:   Diagnosis Date     Anemia          Asthma     No longer uses inhaler, no Hosp     Chronic pain     Abdominal pain with mensturation.     Depressive disorder      Endometriosis 2008     Mental disorder      Other chronic pain     endometriosis     PONV (postoperative nausea and vomiting)           Family History   Problem Relation Age of Onset     Asthma Mother      Allergies Mother      Hypertension Father      Cardiovascular Father         high cholesterol     Neurologic Disorder Maternal Grandmother         dementia     Breast Cancer Maternal Aunt 52        Stage 3--bilateral masectomy     Cancer Maternal Uncle 51        throat cancer       Past Surgical History:   Procedure Laterality Date     DAVINCI PELVIC PROCEDURE  2012    Procedure: DAVINCI PELVIC PROCEDURE;  Davinci Assisted Laparoscopic Endometriosis Ressection, chromotubation;  Surgeon: Vianca Almeida DO;  Location: [facility] OR     DAVINCI PELVIC PROCEDURE Right 3/15/2016    Procedure: DAVINCI PELVIC PROCEDURE;  Surgeon: Vianca Almeida DO;  Location: [facility] OR     ENT SURGERY  2010    left nasal suction catarization & blood vessel removal     GYN SURGERY  2009    laparoscopic with CO2 laser     HEAD & NECK SURGERY  2005    wisdom teeth extraction     LAPAROSCOPIC ENDOMETRIOSIS FULGURATION       nexplanon  8/20/15       Current Outpatient Medications   Medication     acetaminophen (TYLENOL) 325 MG tablet     magnesium oxide 200 MG TABS     MEDS UNK - RECORDS REQUESTED     Misc. Devices (BREAST PUMP) MISC     Prenatal MV-Min-FA-Omega-3 (PRENATAL GUMMIES/DHA & FA PO)     sertraline (ZOLOFT) 25 MG  "tablet     vitamin B-Complex     vitamin D3 (CHOLECALCIFEROL) 25 mcg (22817 units) capsule     No current facility-administered medications for this visit.     Allergies   Allergen Reactions     Seasonal Allergies        Social History     Tobacco Use     Smoking status: Former Smoker     Packs/day: 0.25     Years: 10.00     Pack years: 2.50     Types: Cigarettes     Quit date: 2012     Years since quittin.9     Smokeless tobacco: Never Used   Substance Use Topics     Alcohol use: Not Currently     Alcohol/week: 0.0 standard drinks       Review Of Systems  Ears/Nose/Throat: negative  Respiratory: No shortness of breath, dyspnea on exertion, cough, or hemoptysis  Cardiovascular: negative  Gastrointestinal: negative  Genitourinary: See HPI   Constitutional, HEENT, cardiovascular, pulmonary, GI, , musculoskeletal, neuro, skin, endocrine and psych systems are negative, except as otherwise noted.    OBJECTIVE:  /78 (BP Location: Right arm, Patient Position: Chair, Cuff Size: Adult Regular)   Ht 1.6 m (5' 3\")   Wt 83 kg (183 lb)   Breastfeeding No   BMI 32.42 kg/m     General appearance: healthy, alert and no distress  Skin: Skin color, texture, turgor normal. No rashes or lesions.  Ears: negative  Nose/Sinuses: Nares normal. Septum midline. Mucosa normal. No drainage or sinus tenderness.  Oropharynx: Lips, mucosa, and tongue normal. Teeth and gums normal.  Neck: Neck supple. No adenopathy. Thyroid symmetric, normal size,, Carotids without bruits.  Lungs: negative, Percussion normal. Good diaphragmatic excursion. Lungs clear  Heart: negative, PMI normal. No lifts, heaves, or thrills. RRR. No murmurs, clicks gallops or rub  Breasts: right breast: Inspection negative. No nipple discharge or bleeding. No masses., positive findings: left breast with small suspected   Sebaceous cyst, with discharge when squeezed, superficial, 5 mm      ASSESSMENT:  Joann Ordaz is an 36 year old   woman who " presents for   gynecology consult for left breast lump.    PLAN:  Dx:  1)  Left breast lump:  Suspect sebaceous cyst, referral to dermatology,   Referral to diagnostic mammogram as well.      Labs were reviewed in [facility]   Imaging was reviewed in Epic   Tests and documents were reviewed.   Discussion of management or test interpretation       Dr. Vianca Almeida, DO    Obstetrics and Gynecology  [facility]

## 2021-08-26 NOTE — NURSING NOTE
"Chief Complaint   Patient presents with     Breast Problem     Lump noticed        Initial /78 (BP Location: Right arm, Patient Position: Chair, Cuff Size: Adult Regular)   Ht 1.6 m (5' 3\")   Wt 83 kg (183 lb)   Breastfeeding No   BMI 32.42 kg/m   Estimated body mass index is 32.42 kg/m  as calculated from the following:    Height as of this encounter: 1.6 m (5' 3\").    Weight as of this encounter: 83 kg (183 lb).  BP completed using cuff size: regular    Questioned patient about current smoking habits.  Pt. has never smoked.          The following HM Due: NONE    Elke De La Garza CMA    "

## 2021-08-26 NOTE — PATIENT INSTRUCTIONS
Left breast lump:  Suspect sebaceous cyst, referral to dermatology,   Referral to diagnostic mammogram as well.

## 2021-09-03 ENCOUNTER — HOSPITAL ENCOUNTER (OUTPATIENT)
Dept: ULTRASOUND IMAGING | Facility: CLINIC | Age: 36
End: 2021-09-03
Attending: FAMILY MEDICINE
Payer: COMMERCIAL

## 2021-09-03 ENCOUNTER — HOSPITAL ENCOUNTER (OUTPATIENT)
Dept: MAMMOGRAPHY | Facility: CLINIC | Age: 36
End: 2021-09-03
Attending: FAMILY MEDICINE
Payer: COMMERCIAL

## 2021-09-03 DIAGNOSIS — N63.20 LEFT BREAST LUMP: ICD-10-CM

## 2021-09-03 PROCEDURE — 77066 DX MAMMO INCL CAD BI: CPT

## 2021-09-03 PROCEDURE — 76642 ULTRASOUND BREAST LIMITED: CPT | Mod: LT

## 2021-09-30 ENCOUNTER — VIRTUAL VISIT (OUTPATIENT)
Dept: FAMILY MEDICINE | Facility: CLINIC | Age: 36
End: 2021-09-30
Payer: COMMERCIAL

## 2021-09-30 DIAGNOSIS — J32.9 SINUSITIS, UNSPECIFIED CHRONICITY, UNSPECIFIED LOCATION: Primary | ICD-10-CM

## 2021-09-30 PROCEDURE — 99213 OFFICE O/P EST LOW 20 MIN: CPT | Mod: 95 | Performed by: NURSE PRACTITIONER

## 2021-09-30 RX ORDER — IBUPROFEN 200 MG
200 TABLET ORAL EVERY 4 HOURS PRN
COMMUNITY

## 2021-09-30 NOTE — PROGRESS NOTES
"Estella is a 36 year old who is being evaluated via a billable video visit.      How would you like to obtain your AVS? MyChart  If the video visit is dropped, the invitation should be resent by: Text to cell phone: [phone]  Will anyone else be joining your video visit? No      Video Start Time: 10:15 AM    Assessment & Plan     Sinusitis, unspecified chronicity, unspecified location  She has been working hard with sinus rinses, steroid nasal sprays, and antihistamines without much success over the last couple of weeks.  We will proceed with Augmentin.  We did have a discussion about placing a referral to ENT if her symptoms persist despite 10 days of antibiotics    - amoxicillin-clavulanate (AUGMENTIN) 875-125 MG tablet; Take 1 tablet by mouth 2 times daily    Prescription drug management  11 minutes spent on the date of the encounter doing chart review, history and exam, documentation and further activities per the note       BMI:   Estimated body mass index is 32.42 kg/m  as calculated from the following:    Height as of 8/26/21: 1.6 m (5' 3\").    Weight as of 8/26/21: 83 kg (183 lb).       See Patient Instructions    No follow-ups on file.    Aniket Peterson, [facility]    Subjective   Estella is a 36 year old who presents for the following health issues     HPI     Sinus issues now for 3 weeks.  Has tried a variety of OTC measures including antihistamines, Nasacort, saline sinus rinses, etc. without much success.    No fevers.  Lots of mucopurulent drainage.  Facial fullness.  Sinus pain.  Headaches.  Has not had a sinus infection in about 2 years.      Review of Systems   Constitutional, HEENT, cardiovascular, pulmonary, gi and gu systems are negative, except as otherwise noted.      Objective           Vitals:  No vitals were obtained today due to virtual visit.    Physical Exam   GENERAL: Healthy, alert and no distress  EYES: Eyes grossly normal to inspection.  No " discharge or erythema, or obvious scleral/conjunctival abnormalities.  RESP: No audible wheeze, cough, or visible cyanosis.  No visible retractions or increased work of breathing.    SKIN: Visible skin clear. No significant rash, abnormal pigmentation or lesions.  NEURO: Cranial nerves grossly intact.  Mentation and speech appropriate for age.  PSYCH: Mentation appears normal, affect normal/bright, judgement and insight intact, normal speech and appearance well-groomed.        Video-Visit Details    Type of service:  Video Visit    Video End Time:10:40 AM    Originating Location (pt. Location): Home    Distant Location (provider location):  [facility]     Platform used for Video Visit: [employer]

## 2021-10-02 ENCOUNTER — HEALTH MAINTENANCE LETTER (OUTPATIENT)
Age: 36
End: 2021-10-02

## 2021-10-19 PROBLEM — F32.9 MAJOR DEPRESSION: Status: ACTIVE | Noted: 2021-06-16

## 2022-05-08 ENCOUNTER — HEALTH MAINTENANCE LETTER (OUTPATIENT)
Age: 37
End: 2022-05-08

## 2022-09-09 ENCOUNTER — ALLIED HEALTH/NURSE VISIT (OUTPATIENT)
Dept: FAMILY MEDICINE | Facility: CLINIC | Age: 37
End: 2022-09-09
Payer: COMMERCIAL

## 2022-09-09 DIAGNOSIS — Z23 ENCOUNTER FOR IMMUNIZATION: Primary | ICD-10-CM

## 2022-09-09 PROCEDURE — 90686 IIV4 VACC NO PRSV 0.5 ML IM: CPT

## 2022-09-09 PROCEDURE — 90471 IMMUNIZATION ADMIN: CPT

## 2022-09-09 PROCEDURE — 99207 PR NO CHARGE NURSE ONLY: CPT

## 2022-09-09 NOTE — PROGRESS NOTES
Estella brought her kids into the clinic today for their WCCs.  They were getting their flu vaccines and she asked to get hers as well.  Administered fluzone in LT deltoid.

## 2022-12-16 ENCOUNTER — E-VISIT (OUTPATIENT)
Dept: URGENT CARE | Facility: CLINIC | Age: 37
End: 2022-12-16
Payer: COMMERCIAL

## 2022-12-16 DIAGNOSIS — J06.9 VIRAL URI WITH COUGH: Primary | ICD-10-CM

## 2022-12-16 PROCEDURE — 99421 OL DIG E/M SVC 5-10 MIN: CPT | Performed by: NURSE PRACTITIONER

## 2022-12-16 RX ORDER — ALBUTEROL SULFATE 90 UG/1
2 AEROSOL, METERED RESPIRATORY (INHALATION) EVERY 6 HOURS PRN
Qty: 18 G | Refills: 0 | Status: SHIPPED | OUTPATIENT
Start: 2022-12-16

## 2022-12-16 RX ORDER — BENZONATATE 100 MG/1
100 CAPSULE ORAL 3 TIMES DAILY PRN
Qty: 30 CAPSULE | Refills: 0 | Status: SHIPPED | OUTPATIENT
Start: 2022-12-16 | End: 2022-12-28

## 2022-12-16 NOTE — PATIENT INSTRUCTIONS
"  Dear Joann Ordaz    After reviewing your responses, I've been able to diagnose you with \"Bronchitis\" which is a common infection of your lungs that is nearly always caused by a virus. The virus causes swelling and irritation of the air passages of your lungs which leads to cough. The illness spreads from your nose and throat to your windpipe and airways. It is often called a \"chest cold\" and can last up to 2 weeks, but is not a serious illness. Exposure to cigarette smoke usually makes this significantly worse.      To treat bronchitis, the main thing to do is drink lots of fluids and rest. Cough medications over-the-counter such as mucinex, robitussin or \"cold and sinus\" medications can be helpful. Ibuprofen and Tylenol also help with fevers or aching feelings that you often have with this kind of illness. Do not take ibuprofen if you have kidney disease, stomach ulcers or allergy to aspirin.     Bronchitis is most often highly contagious as viruses are spread through the air or touch. Avoid contact with others who may become infected, particularly children, the elderly and those whose immune systems might be weak.     If your symptoms worsen, you develop chest pain or shortness of breath, fevers over 101, or are not improving in 5 days, please contact your primary care provider for an appointment or visit any of our convenient Walk-in Care or Urgent Care Centers to be seen which can be found on our website here.    Thanks again for choosing us as your health care partner,    FAM Rubio CNP  "

## 2022-12-25 ENCOUNTER — E-VISIT (OUTPATIENT)
Dept: URGENT CARE | Facility: CLINIC | Age: 37
End: 2022-12-25
Payer: COMMERCIAL

## 2022-12-25 DIAGNOSIS — R05.9 COUGH, UNSPECIFIED TYPE: Primary | ICD-10-CM

## 2022-12-25 PROCEDURE — 99207 PR NON-BILLABLE SERV PER CHARTING: CPT | Performed by: FAMILY MEDICINE

## 2022-12-26 NOTE — PATIENT INSTRUCTIONS
Dear Joann Ordaz,    We are sorry you are not feeling well. Based on the responses you provided, it is recommended that you be seen in-person in urgent care so we can better evaluate your symptoms. Please click here to find the nearest urgent care location to you.   You will not be charged for this Visit. Thank you for trusting us with your care.    Mell Alonzo MD

## 2022-12-28 ENCOUNTER — OFFICE VISIT (OUTPATIENT)
Dept: FAMILY MEDICINE | Facility: CLINIC | Age: 37
End: 2022-12-28
Payer: COMMERCIAL

## 2022-12-28 VITALS
HEART RATE: 105 BPM | WEIGHT: 187 LBS | SYSTOLIC BLOOD PRESSURE: 122 MMHG | RESPIRATION RATE: 18 BRPM | BODY MASS INDEX: 33.13 KG/M2 | DIASTOLIC BLOOD PRESSURE: 77 MMHG | TEMPERATURE: 98.8 F | OXYGEN SATURATION: 97 %

## 2022-12-28 DIAGNOSIS — R05.2 SUBACUTE COUGH: ICD-10-CM

## 2022-12-28 DIAGNOSIS — M94.0 COSTOCHONDRITIS: Primary | ICD-10-CM

## 2022-12-28 PROCEDURE — 99213 OFFICE O/P EST LOW 20 MIN: CPT | Performed by: STUDENT IN AN ORGANIZED HEALTH CARE EDUCATION/TRAINING PROGRAM

## 2022-12-28 RX ORDER — BENZONATATE 100 MG/1
100 CAPSULE ORAL 3 TIMES DAILY PRN
Qty: 42 CAPSULE | Refills: 0 | Status: SHIPPED | OUTPATIENT
Start: 2022-12-28 | End: 2023-01-11

## 2022-12-28 NOTE — PROGRESS NOTES
URGENT CARE VISIT:    SUBJECTIVE:   Joann Ordaz is a 37 year old female presenting with a chief complaint of 3 weeks of stuffy nose and cough - productive. Chest soreness when she coughs and breaths started 1 week ago.  She denies the following symptoms: fever, chills, wheezing and shortness of breath  Course of illness is same.    Treatment measures tried include Tylenol/Ibuprofen, OTC Cough med, Inhaler (name: albuterol) last used 3 days ago, Fluids and Rest with transient relief of symptoms. She is eating and drinking normally.   Predisposing factors include ill contact: Family member .    PMH:   Past Medical History:   Diagnosis Date     Anemia     2016     Asthma     No longer uses inhaler, no Hosp     Chronic pain     Abdominal pain with mensturation.     Depressive disorder      Endometriosis 2008     Mental disorder      Other chronic pain     endometriosis     PONV (postoperative nausea and vomiting)      Allergies: Seasonal allergies   Medications:   Current Outpatient Medications   Medication Sig Dispense Refill     acetaminophen (TYLENOL) 325 MG tablet Take 325-650 mg by mouth every 6 hours as needed for mild pain       albuterol (PROAIR HFA/PROVENTIL HFA/VENTOLIN HFA) 108 (90 Base) MCG/ACT inhaler Inhale 2 puffs into the lungs every 6 hours as needed for shortness of breath, wheezing or cough 18 g 0     benzonatate (TESSALON) 100 MG capsule Take 1 capsule (100 mg) by mouth 3 times daily as needed for cough 42 capsule 0     ibuprofen (ADVIL/MOTRIN) 200 MG tablet Take 200 mg by mouth every 4 hours as needed for mild pain       magnesium oxide 200 MG TABS Take 1 tablet by mouth daily       Prenatal MV-Min-FA-Omega-3 (PRENATAL GUMMIES/DHA & FA PO)        vitamin B-Complex Take 1 tablet by mouth daily       vitamin D3 (CHOLECALCIFEROL) 25 mcg (22122 units) capsule Take 1 capsule by mouth daily       Social History:   Social History     Tobacco Use     Smoking status: Former     Packs/day: 0.25      Years: 10.00     Pack years: 2.50     Types: Cigarettes     Quit date: 9/20/2012     Years since quitting: 10.2     Smokeless tobacco: Never   Substance Use Topics     Alcohol use: Not Currently     Alcohol/week: 0.0 standard drinks       ROS:  Review of systems negative except as stated above.    OBJECTIVE:  /77 (BP Location: Right arm, Patient Position: Sitting, Cuff Size: Adult Large)   Pulse 105   Temp 98.8  F (37.1  C) (Oral)   Resp 18   Wt 84.8 kg (187 lb)   LMP 11/24/2022   SpO2 97%   BMI 33.13 kg/m    GENERAL APPEARANCE: healthy, alert and no distress  EYES: EOMI,  PERRL, conjunctiva clear  HENT: ear canals and TM's normal.  Nose and mouth without ulcers, erythema or lesions  NECK: supple, nontender, no lymphadenopathy  RESP: no increased WOB, lungs clear to auscultation - no rales, rhonchi or wheezes  CV: regular rates and rhythm, normal S1 S2, no murmur noted  Chest: chest pain reproducible to palpation    Labs:    No results found for any visits on 12/28/22.    ASSESSMENT:    ICD-10-CM    1. Costochondritis  M94.0       2. Subacute cough  R05.2 benzonatate (TESSALON) 100 MG capsule      No concern for asthma exacerbation or respiratory failure. Patient has post viral cough with costochondritis. No indication for CXR. Continue conservative management and discussed warning signs.    PLAN:  Patient Instructions   Patient was educated on the natural course of viral illness which typically lasts up to 10 days.  Conservative measures discussed including increased fluids, nasal saline irrigation (neti pot), warm steamy shower, salt water gargles, cough suppressants, expectorants (Mucinex), decongestants (Pseudofed), and analgesics (Tylenol and/or Ibuprofen). See your primary care provider if symptoms worsen or do not improve in 5 days. Seek emergency care if you develop fever over 104 or shortness of breath.     Patient verbalized understanding and is agreeable to plan. The patient was discharged  ambulatory and in stable condition.    Jessica Bolanos MD ....................  12/28/2022   3:01 PM

## 2022-12-28 NOTE — PATIENT INSTRUCTIONS
Patient was educated on the natural course of viral illness which typically lasts up to 10 days.  Conservative measures discussed including increased fluids, nasal saline irrigation (neti pot), warm steamy shower, salt water gargles, cough suppressants, expectorants (Mucinex), decongestants (Pseudofed), and analgesics (Tylenol and/or Ibuprofen). See your primary care provider if symptoms worsen or do not improve in 5 days. Seek emergency care if you develop fever over 104 or shortness of breath.

## 2023-06-02 ENCOUNTER — HEALTH MAINTENANCE LETTER (OUTPATIENT)
Age: 38
End: 2023-06-02

## 2023-06-25 ENCOUNTER — E-VISIT (OUTPATIENT)
Dept: URGENT CARE | Facility: URGENT CARE | Age: 38
End: 2023-06-25
Payer: COMMERCIAL

## 2023-06-25 ENCOUNTER — OFFICE VISIT (OUTPATIENT)
Dept: FAMILY MEDICINE | Facility: CLINIC | Age: 38
End: 2023-06-25
Payer: COMMERCIAL

## 2023-06-25 VITALS
TEMPERATURE: 98.2 F | WEIGHT: 188 LBS | BODY MASS INDEX: 33.3 KG/M2 | SYSTOLIC BLOOD PRESSURE: 125 MMHG | RESPIRATION RATE: 15 BRPM | HEART RATE: 103 BPM | DIASTOLIC BLOOD PRESSURE: 80 MMHG | OXYGEN SATURATION: 100 %

## 2023-06-25 DIAGNOSIS — J02.9 PHARYNGITIS, UNSPECIFIED ETIOLOGY: Primary | ICD-10-CM

## 2023-06-25 DIAGNOSIS — J02.9 VIRAL PHARYNGITIS: Primary | ICD-10-CM

## 2023-06-25 LAB
DEPRECATED S PYO AG THROAT QL EIA: NEGATIVE
GROUP A STREP BY PCR: NOT DETECTED

## 2023-06-25 PROCEDURE — 99213 OFFICE O/P EST LOW 20 MIN: CPT | Performed by: PHYSICIAN ASSISTANT

## 2023-06-25 PROCEDURE — 99207 PR NO BILLABLE SERVICE THIS VISIT: CPT | Performed by: PHYSICIAN ASSISTANT

## 2023-06-25 PROCEDURE — 87651 STREP A DNA AMP PROBE: CPT | Performed by: PHYSICIAN ASSISTANT

## 2023-06-25 RX ORDER — DIPHENHYDRAMINE HCL 25 MG
25 TABLET ORAL EVERY 6 HOURS PRN
COMMUNITY

## 2023-06-25 RX ORDER — PREDNISONE 20 MG/1
40 TABLET ORAL DAILY
Qty: 10 TABLET | Refills: 0 | Status: SHIPPED | OUTPATIENT
Start: 2023-06-25 | End: 2023-06-30

## 2023-06-25 RX ORDER — FEXOFENADINE HCL 180 MG/1
180 TABLET ORAL DAILY
COMMUNITY

## 2023-06-25 NOTE — PROGRESS NOTES
Assessment & Plan:      Problem List Items Addressed This Visit    None  Visit Diagnoses     Viral pharyngitis    -  Primary    Relevant Medications    predniSONE (DELTASONE) 20 MG tablet    Other Relevant Orders    Streptococcus A Rapid Screen w/Reflex to PCR - Clinic Collect (Completed)    Group A Streptococcus PCR Throat Swab        Medical Decision Making  Patient presents with sore throat and nasal congestion worsening over the last week.  Rapid strep is negative.  Symptoms appear consistent with a viral pharyngitis.  Recommend trial of prednisone to help with sensation of throat swelling and shortness of breath.  Otherwise, physical exam here at the clinic appears reassuring with no signs of respiratory distress.  Discussed treatment and symptomatic care.  Allergies and medication interactions reviewed.  Discussed signs of worsening symptoms and when to follow-up with PCP if no symptom improvement.     Subjective:      Joann Ordaz is a 38 year old female here for evaluation of sore throat and nasal congestion.  Onset of symptoms was 1 week ago.  Patient has significant seasonal allergies at baseline, but feels that symptoms are worse over the last week.  She denies fevers.  Patient has been waking up in the middle the night the last couple nights and feeling like her throat is closing feeling short of breath.  No wheezing.     The following portions of the patient's history were reviewed and updated as appropriate: allergies, current medications, and problem list.     Review of Systems  Pertinent items are noted in HPI.    Allergies  Allergies   Allergen Reactions     Seasonal Allergies        Family History   Problem Relation Age of Onset     Asthma Mother      Allergies Mother      Hypertension Father      Cardiovascular Father         high cholesterol     Neurologic Disorder Maternal Grandmother         dementia     Breast Cancer Maternal Aunt 52        Stage 3--bilateral masectomy     Cancer  Maternal Uncle 51        throat cancer       Social History     Tobacco Use     Smoking status: Former     Packs/day: 0.25     Years: 10.00     Pack years: 2.50     Types: Cigarettes     Quit date: 9/20/2012     Years since quitting: 10.7     Smokeless tobacco: Never   Substance Use Topics     Alcohol use: Not Currently     Alcohol/week: 0.0 standard drinks of alcohol        Objective:      /80   Pulse 103   Temp 98.2  F (36.8  C)   Resp 15   Wt 85.3 kg (188 lb)   SpO2 100%   BMI 33.30 kg/m    General appearance - alert, well appearing, and in no distress and non-toxic  Ears - TMs intact with moderate serous fluid and bulging bilaterally, erythema  Nose - normal and patent, no erythema, discharge or polyps  Mouth - Posterior pharynx is mildly erythematous with mild tonsillar swelling, no exudate  Neck - supple, no significant adenopathy  Chest - clear to auscultation, no wheezes, rales or rhonchi, symmetric air entry  Heart - normal rate, regular rhythm, normal S1, S2, no murmurs, rubs, clicks or gallops     Lab & Imaging Results    Results for orders placed or performed in visit on 06/25/23   Streptococcus A Rapid Screen w/Reflex to PCR - Clinic Collect     Status: Normal    Specimen: Throat; Swab   Result Value Ref Range    Group A Strep antigen Negative Negative       I personally reviewed these results and discussed findings with the patient.    The use of Dragon/Dogeo dictation services was used to construct the content of this note; any grammatical errors are non-intentional. Please contact the author directly if you are in need of any clarification.

## 2023-06-25 NOTE — PATIENT INSTRUCTIONS
Dear Joann Ordaz    I am concerned your symptoms may be due to an infection caused by Strep bacteria.  Please visit your local clinic to have a test run to check for the presence of this bacteria.    Thanks for choosing us as your health care partner,    Obi Olvera PA-C

## 2023-07-21 NOTE — ADDENDUM NOTE
[facility] [address]  [address]  Phone: [phone]   Hours: 8am-6pm   www. [employer]    [facility]  [address], [address]   Phone: [phone]  Hours: M, Tues 9am-6pm, W, Thurs 9am-8pm, F 10am-2pm  www.[employer]. [address] for [facility] and [address] [facility] [address] [place] [address], [address]  Phone: [phone]  Hours: M-Thurs 8am-7pm, F 8am-3pm  www. [address] [place], [address]  Phone: [phone]  Hours: M-F 8:30am -5pm  www.[email or web address]      [address] [place] [address] [address] [facility]  Phone: [phone]  Crisis Hotline: 8-729.699.5740  Hours: M-F 8am-5pm (Hotline 24/7)  [email or web address]      [facility] [employer] [facility])   [address]  Phone: [phone]  www. [employer]      [facility])   [address] [facility], [address] [place] [address]  [phone]  www. [facility] Addended by: REHAN PIERRE on: 11/28/2020 12:09 AM     Modules accepted: Orders

## 2023-08-02 ENCOUNTER — E-VISIT (OUTPATIENT)
Dept: URGENT CARE | Facility: CLINIC | Age: 38
End: 2023-08-02
Payer: COMMERCIAL

## 2023-08-02 DIAGNOSIS — J01.90 ACUTE SINUSITIS, RECURRENCE NOT SPECIFIED, UNSPECIFIED LOCATION: Primary | ICD-10-CM

## 2023-08-02 PROCEDURE — 99207 PR NON-BILLABLE SERV PER CHARTING: CPT | Performed by: PREVENTIVE MEDICINE

## 2023-08-02 NOTE — PATIENT INSTRUCTIONS
You may want to try a nasal lavage (also known as nasal irrigation). You can find over-the-counter products, such as Neti-Pot, at retail locations or make your own at home. Instructions for homemade nasal lavage and more information on the process are available online at http://www.aafp.org/afp/2009/1115/p1121.html.    Dear Joann Ordaz    After reviewing your responses, I've been able to diagnose you with Acute sinusitis, recurrence not specified, unspecified location.      Based on your responses and diagnosis, I have prescribed No orders of the defined types were placed in this encounter.   to treat your symptoms. I have sent this to your pharmacy.?     It is also important to stay well hydrated, get lots of rest and take over-the-counter decongestants,?tylenol?or ibuprofen if you?are able to?take those medications per your primary care provider to help relieve discomfort.?     It is important that you take?all of?your prescribed medication even if your symptoms are improving after a few doses.? Taking?all of?your medicine helps prevent the symptoms from returning.?     If your symptoms worsen, you develop severe headache, vomiting, high fever (>102), or are not improving in 7 days, please contact your primary care provider for an appointment or visit any of our convenient Walk-in Care or Urgent Care Centers to be seen which can be found on our website?here.?     Thanks again for choosing?us?as your health care partner,?   ?  Harry Toro MD, MD?

## 2023-12-26 ENCOUNTER — IMMUNIZATION (OUTPATIENT)
Dept: NURSING | Facility: CLINIC | Age: 38
End: 2023-12-26
Payer: COMMERCIAL

## 2023-12-26 PROCEDURE — 90686 IIV4 VACC NO PRSV 0.5 ML IM: CPT

## 2023-12-26 PROCEDURE — 90471 IMMUNIZATION ADMIN: CPT

## 2024-01-02 ENCOUNTER — E-VISIT (OUTPATIENT)
Dept: INTERNAL MEDICINE | Facility: CLINIC | Age: 39
End: 2024-01-02
Payer: COMMERCIAL
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
## 2024-01-02 PROCEDURE — 99421 OL DIG E/M SVC 5-10 MIN: CPT | Performed by: NURSE PRACTITIONER

## 2024-01-03 NOTE — PATIENT INSTRUCTIONS
Thank you for choosing us for your care. I have placed an order for a prescription so that you can start treatment. View your full visit summary for details by clicking on the link below. Your pharmacist will able to address any questions you may have about the medication.     If you're not feeling better within 5-7 days, please schedule an appointment.  You can schedule an appointment right here in [facility], or call [phone]  If the visit is for the same symptoms as your eVisit, we'll refund the cost of your eVisit if seen within seven days.

## 2024-02-19 ENCOUNTER — OFFICE VISIT (OUTPATIENT)
Dept: ORTHOPEDICS | Facility: CLINIC | Age: 39
End: 2024-02-19
Payer: COMMERCIAL

## 2024-02-19 ENCOUNTER — ANCILLARY PROCEDURE (OUTPATIENT)
Dept: GENERAL RADIOLOGY | Facility: CLINIC | Age: 39
End: 2024-02-19
Attending: STUDENT IN AN ORGANIZED HEALTH CARE EDUCATION/TRAINING PROGRAM
Payer: COMMERCIAL

## 2024-02-19 DIAGNOSIS — S93.492A SPRAIN OF POSTERIOR TALOFIBULAR LIGAMENT OF LEFT ANKLE, INITIAL ENCOUNTER: Primary | ICD-10-CM

## 2024-02-19 DIAGNOSIS — S99.912A INJURY OF LEFT ANKLE, INITIAL ENCOUNTER: ICD-10-CM

## 2024-02-19 DIAGNOSIS — S93.492A SPRAIN OF ANTERIOR TALOFIBULAR LIGAMENT OF LEFT ANKLE, INITIAL ENCOUNTER: ICD-10-CM

## 2024-02-19 PROCEDURE — 73610 X-RAY EXAM OF ANKLE: CPT | Mod: TC | Performed by: RADIOLOGY

## 2024-02-19 PROCEDURE — 73630 X-RAY EXAM OF FOOT: CPT | Mod: TC | Performed by: RADIOLOGY

## 2024-02-19 PROCEDURE — 99203 OFFICE O/P NEW LOW 30 MIN: CPT | Performed by: STUDENT IN AN ORGANIZED HEALTH CARE EDUCATION/TRAINING PROGRAM

## 2024-02-19 NOTE — LETTER
2/19/2024         RE: Cosmostephanie GRANADOS Orlin  [address]  [place] [address]        Dear Colleague,    Thank you for referring your patient, Joann Ordaz, to the [facility]. Please see a copy of my visit note below.    ASSESSMENT & PLAN    Estella was seen today for pain and pain.    Diagnoses and all orders for this visit:    Sprain of posterior talofibular ligament of left ankle, initial encounter  -     Ankle/Foot Bracing Supplies Order Walking Boot; Left; Pneumatic; Short    Injury of left ankle, initial encounter  -     XR Foot LT G/E 3 vw; Future  -     XR Ankle Left G/E 3 Views; Future  -     Ankle/Foot Bracing Supplies Order Walking Boot; Left; Pneumatic; Short    Sprain of anterior talofibular ligament of left ankle, initial encounter      38-year-old female presents with left lateral ankle and foot pain after tripping on a stair this morning.  She has lateral ankle swelling and exquisite tenderness to palpation of the ATFL, CFL, and PTFL.  She does have a history of prior ankle sprains.  X-ray today does reveal a small ossific fragment that likely represents a lateral talus avulsion fracture.  Exam is slightly limited secondary to acute pain and swelling.    Plan:  -Walking boot to left foot when standing and walking, provided in clinic today  -Weight bearing as tolerated  -Tylenol Extra Strength (1000mg) up to three times daily as needed for pain, can alternate with ibuprofen  -Can work on elevation and icing for 20 minutes at a time to help with swelling  -Will repeat imaging in 7-10 days    Return in about 1 week (around 2/26/2024).      Dr. Pearl Manning, DO, CAQ  [facility]  Sports Medicine     -----  Chief Complaint   Patient presents with     Left Ankle - Pain     Left Foot - Pain       SUBJECTIVE  Joann Ordaz is a/an 38 year old female who is seen as a self referral for evaluation of left foot and ankle  injury.  Onset was today, 2/19, as she tripped off the last stair and heard a loud pop. Pain is located in the left foot and ankle. Patient reports that the pain is diffuse throughout the ankle joint and down into the foot. Symptoms are worsened by walking, use.  She has tried nothing yet. Associated symptoms include burning, Swelling.    The patient is seen with her 2 kids    Prior injury/Surgical history of affected joint: history of many ankle sprains  Social History/Occupation: [employer]     REVIEW OF SYSTEMS:  Pertinent positives/negative: As stated above in HPI    OBJECTIVE:  There were no vitals taken for this visit.   General: Alert and in no distress  Skin: no visable rashes  CV: Extremities appear well perfused   Resp: normal respiratory effort, no conversational dyspnea   Psych: normal mood, affect  MSK:  Left ankle exam:    Inspection: Lateral ankle swelling  AROM: limited secondary to swelling and pain to plantarflexion, dorsiflexion, inversion, eversion  Tender to palpation: Tender over CFL, PTFL, Tender over ATFL, and Tender over lateral malleolus  Motor exam: Deferred secondary to pain  Special tests: + for Anterior drawer, - for Syndesmosis squeeze, Kleiger's test, and Torres test       RADIOLOGY:  Final results and radiologist's interpretation available in the [facility] health record.  Images below were personally reviewed and discussed with the patient in the office today.  My personal interpretation of the performed imaging: X-ray of the left ankle from 2/19/2024 reveals tiny ossific fragment distal to the lateral malleolus that may represent an avulsion fracture.  X-ray of the left foot today shows no osseous abnormalities                   Again, thank you for allowing me to participate in the care of your patient.        Sincerely,        Pearl Manning, DO

## 2024-02-19 NOTE — PROGRESS NOTES
ASSESSMENT & PLAN    Estella was seen today for pain and pain.    Diagnoses and all orders for this visit:    Sprain of posterior talofibular ligament of left ankle, initial encounter  -     Ankle/Foot Bracing Supplies Order Walking Boot; Left; Pneumatic; Short    Injury of left ankle, initial encounter  -     XR Foot LT G/E 3 vw; Future  -     XR Ankle Left G/E 3 Views; Future  -     Ankle/Foot Bracing Supplies Order Walking Boot; Left; Pneumatic; Short    Sprain of anterior talofibular ligament of left ankle, initial encounter      38-year-old female presents with left lateral ankle and foot pain after tripping on a stair this morning.  She has lateral ankle swelling and exquisite tenderness to palpation of the ATFL, CFL, and PTFL.  She does have a history of prior ankle sprains.  X-ray today does reveal a small ossific fragment that likely represents a lateral talus avulsion fracture.  Exam is slightly limited secondary to acute pain and swelling.    Plan:  -Walking boot to left foot when standing and walking, provided in clinic today  -Weight bearing as tolerated  -Tylenol Extra Strength (1000mg) up to three times daily as needed for pain, can alternate with ibuprofen  -Can work on elevation and icing for 20 minutes at a time to help with swelling  -Will repeat imaging in 7-10 days    Return in about 1 week (around 2/26/2024).      Dr. Pearl Manning DO, [facility]  Sports Medicine     -----  Chief Complaint   Patient presents with    Left Ankle - Pain    Left Foot - Pain       SUBJECTIVE  Joann Ordaz is a/an 38 year old female who is seen as a self referral for evaluation of left foot and ankle injury.  Onset was today, 2/19, as she tripped off the last stair and heard a loud pop. Pain is located in the left foot and ankle. Patient reports that the pain is diffuse throughout the ankle joint and down into the foot. Symptoms are worsened by walking, use.  She has tried  nothing yet. Associated symptoms include burning, Swelling.    The patient is seen with her 2 kids    Prior injury/Surgical history of affected joint: history of many ankle sprains  Social History/Occupation: [employer]     REVIEW OF SYSTEMS:  Pertinent positives/negative: As stated above in HPI    OBJECTIVE:  There were no vitals taken for this visit.   General: Alert and in no distress  Skin: no visable rashes  CV: Extremities appear well perfused   Resp: normal respiratory effort, no conversational dyspnea   Psych: normal mood, affect  MSK:  Left ankle exam:    Inspection: Lateral ankle swelling  AROM: limited secondary to swelling and pain to plantarflexion, dorsiflexion, inversion, eversion  Tender to palpation: Tender over CFL, PTFL, Tender over ATFL, and Tender over lateral malleolus  Motor exam: Deferred secondary to pain  Special tests: + for Anterior drawer, - for Syndesmosis squeeze, Kleiger's test, and Torres test       RADIOLOGY:  Final results and radiologist's interpretation available in the [facility] health record.  Images below were personally reviewed and discussed with the patient in the office today.  My personal interpretation of the performed imaging: X-ray of the left ankle from 2/19/2024 reveals tiny ossific fragment distal to the lateral malleolus that may represent an avulsion fracture.  X-ray of the left foot today shows no osseous abnormalities

## 2024-02-19 NOTE — PATIENT INSTRUCTIONS
1. Injury of left ankle, initial encounter    2. Sprain of lateral ligament of ankle joint        Plan:  -Walking boot to left foot when standing and walking  -Weight bearing as tolerated  -Tylenol Extra Strength (1000mg) up to three times daily as needed for pain, can alternate with ibuprofen  -Can work on elevation and icing for 20 minutes at a time     If you have any questions or concerns after your appointment, please send a [employer] message or call the clinic at [phone]    Pearl Manning DO, [facility]  Sports Medicine    Thank you for choosing [facility] Medicine!    DR. MANNING'S CLINIC LOCATIONS:     [place]  TRIAGE LINE: [phone] [address] [facility] APPOINTMENTS: [phone]   [place] [address] RADIOLOGY: [phone]   (Mondays & Tuesdays) HAND THERAPY: [phone]    PHYSICAL THERAPY: [phone]   [place] BILLING QUESTIONS: [phone] [address] FAX: [phone]   [place] [address]    (Thursdays & Fridays)

## 2024-02-20 NOTE — PROGRESS NOTES
ASSESSMENT & PLAN    Estella was seen today for follow up.    Diagnoses and all orders for this visit:    Sprain of anterior talofibular ligament of left ankle, subsequent encounter    Inversion sprain of ankle, left, subsequent encounter    Sprain of calcaneofibular ligament of left ankle, subsequent encounter        38-year-old female presents to follow-up on left inversion ankle sprain with a small avulsion fracture of the talus.  She has been walking boot for the past week and pain and swelling have significantly improved today.  She continues to have tenderness to palpation distal to the lateral malleolus tenderness of the ATFL and CFL, but no significant laxity with anterior drawer or talar tilt test.    Plan:  -Gradually wean out of walking boot over next 7-10 days. Can transition to lace up ankle brace.   -Wear lace-up ankle brace for any athletic activity for next 6-8 weeks. Can try ASO ankle brace  -Ibuprofen or Tylenol as needed, elevate, consider compression socks   -Start home exercise program, gradually increase as tolerated       Return if symptoms worsen or fail to improve.      Dr. Pearl Manning, DO  [facility]  Sports Medicine     -----  Chief Complaint   Patient presents with    Left Ankle - Follow Up       SUBJECTIVE  Joann Ordaz is a/an 38 year old female who is seen to follow-up on left ankle pain. The patient was last seen 2/19/24. Since last visit, she has great improvements in pain and function. She has 1/10 pain walking in the boot, none at rest. She feels her ankle without the boot would feel 70% of her baseline.     The patient is seen with her son        REVIEW OF SYSTEMS:  Pertinent positives/negative: As stated above in HPI    OBJECTIVE:  /66    General: Alert and in no distress  Skin: no visable rashes  CV: Extremities appear well perfused   Resp: normal respiratory effort, no conversational dyspnea   Psych: normal mood, affect  MSK:  Left ankle  exam:  Inspection: Mild swelling, no ecchymosis  AROM: slightly limited to plantarflexion, dorsiflexion, inversion, eversion  Tender to palpation: Tender over ATFL and Tender over lateral malleolus  Motor exam: Able to perform heel raise with pain, able to resist against inversion, eversion, and dorsiflexion  Special tests: + for None, - for Talar tilt, Anterior drawer, Syndesmosis squeeze, and Kleiger's test       RADIOLOGY:  Final results and radiologist's interpretation available in the [facility] health record.  Images below were personally reviewed and discussed with the patient in the office today.  No new imaging today

## 2024-02-27 ENCOUNTER — OFFICE VISIT (OUTPATIENT)
Dept: ORTHOPEDICS | Facility: CLINIC | Age: 39
End: 2024-02-27
Payer: COMMERCIAL

## 2024-02-27 VITALS — SYSTOLIC BLOOD PRESSURE: 108 MMHG | DIASTOLIC BLOOD PRESSURE: 66 MMHG

## 2024-02-27 DIAGNOSIS — S93.402D INVERSION SPRAIN OF ANKLE, LEFT, SUBSEQUENT ENCOUNTER: ICD-10-CM

## 2024-02-27 DIAGNOSIS — S93.412D SPRAIN OF CALCANEOFIBULAR LIGAMENT OF LEFT ANKLE, SUBSEQUENT ENCOUNTER: ICD-10-CM

## 2024-02-27 DIAGNOSIS — S93.492D SPRAIN OF ANTERIOR TALOFIBULAR LIGAMENT OF LEFT ANKLE, SUBSEQUENT ENCOUNTER: Primary | ICD-10-CM

## 2024-02-27 PROCEDURE — 99213 OFFICE O/P EST LOW 20 MIN: CPT | Performed by: STUDENT IN AN ORGANIZED HEALTH CARE EDUCATION/TRAINING PROGRAM

## 2024-02-27 NOTE — LETTER
2/27/2024         RE: Joann Ordaz  [address]  [place] [address]        Dear Colleague,    Thank you for referring your patient, Joann Ordaz, to the [facility] SPORTS MEDICINE CLINIC [facility]. Please see a copy of my visit note below.    ASSESSMENT & PLAN    Estella was seen today for follow up.    Diagnoses and all orders for this visit:    Sprain of anterior talofibular ligament of left ankle, subsequent encounter    Inversion sprain of ankle, left, subsequent encounter    Sprain of calcaneofibular ligament of left ankle, subsequent encounter        38-year-old female presents to follow-up on left inversion ankle sprain with a small avulsion fracture of the talus.  She has been walking boot for the past week and pain and swelling have significantly improved today.  She continues to have tenderness to palpation distal to the lateral malleolus tenderness of the ATFL and CFL, but no significant laxity with anterior drawer or talar tilt test.    Plan:  -Gradually wean out of walking boot over next 7-10 days. Can transition to lace up ankle brace.   -Wear lace-up ankle brace for any athletic activity for next 6-8 weeks. Can try ASO ankle brace  -Ibuprofen or Tylenol as needed, elevate, consider compression socks   -Start home exercise program, gradually increase as tolerated       Return if symptoms worsen or fail to improve.      Dr. Pearl Manning, DO  [facility]  Sports Medicine     -----  Chief Complaint   Patient presents with     Left Ankle - Follow Up       SUBJECTIVE  Joann Ordaz is a/an 38 year old female who is seen to follow-up on left ankle pain. The patient was last seen 2/19/24. Since last visit, she has great improvements in pain and function. She has 1/10 pain walking in the boot, none at rest. She feels her ankle without the boot would feel 70% of her baseline.     The patient is seen with her son        REVIEW OF  SYSTEMS:  Pertinent positives/negative: As stated above in HPI    OBJECTIVE:  /66    General: Alert and in no distress  Skin: no visable rashes  CV: Extremities appear well perfused   Resp: normal respiratory effort, no conversational dyspnea   Psych: normal mood, affect  MSK:  Left ankle exam:  Inspection: Mild swelling, no ecchymosis  AROM: slightly limited to plantarflexion, dorsiflexion, inversion, eversion  Tender to palpation: Tender over ATFL and Tender over lateral malleolus  Motor exam: Able to perform heel raise with pain, able to resist against inversion, eversion, and dorsiflexion  Special tests: + for None, - for Talar tilt, Anterior drawer, Syndesmosis squeeze, and Kleiger's test       RADIOLOGY:  Final results and radiologist's interpretation available in the [facility] health record.  Images below were personally reviewed and discussed with the patient in the office today.  No new imaging today                   Again, thank you for allowing me to participate in the care of your patient.        Sincerely,        Pearl Manning, DO

## 2024-02-27 NOTE — PATIENT INSTRUCTIONS
1. Sprain of anterior talofibular ligament of left ankle, subsequent encounter    2. Inversion sprain of ankle, left, subsequent encounter        Plan:  -Gradually wean out of walking boot over next 7-10 days. Can transition to lace up ankle brace.   -Wear lace-up ankle brace for any athletic activity for next 6-8 weeks. Can try ASO ankle brace  -Ibuprofen or Tylenol as needed, elevate, consider compression socks   -Start home exercise program, gradually increase as tolerated     If you have any questions or concerns after your appointment, please send a [employer] message or call the clinic at [phone]    Pearl Manning DO, CAM  [facility]  Sports Medicine    Thank you for choosing [facility] Sports Medicine!    DR. MANNING'S CLINIC LOCATIONS:     [place]  TRIAGE LINE: [phone]   [address] [place], [place] [address] APPOINTMENTS: [phone]   [place] [address] RADIOLOGY: [phone]   (Mondays & Tuesdays) HAND THERAPY: [phone]    PHYSICAL THERAPY: [phone]   [place] BILLING QUESTIONS: [phone] [address] FAX: [phone]   [place] [address]    (Thursdays & Fridays)

## 2024-06-30 ENCOUNTER — HEALTH MAINTENANCE LETTER (OUTPATIENT)
Age: 39
End: 2024-06-30

## 2024-08-03 ENCOUNTER — E-VISIT (OUTPATIENT)
Dept: URGENT CARE | Facility: CLINIC | Age: 39
End: 2024-08-03
Payer: COMMERCIAL

## 2024-08-03 DIAGNOSIS — T75.3XXA MOTION SICKNESS, INITIAL ENCOUNTER: Primary | ICD-10-CM

## 2024-08-03 PROCEDURE — 99207 PR NON-BILLABLE SERV PER CHARTING: CPT | Performed by: PREVENTIVE MEDICINE

## 2024-08-03 RX ORDER — SCOLOPAMINE TRANSDERMAL SYSTEM 1 MG/1
1 PATCH, EXTENDED RELEASE TRANSDERMAL
Qty: 3 PATCH | Refills: 0 | Status: SHIPPED | OUTPATIENT
Start: 2024-08-03 | End: 2024-08-12

## 2025-05-05 ENCOUNTER — NURSE TRIAGE (OUTPATIENT)
Dept: OBGYN | Facility: CLINIC | Age: 40
End: 2025-05-05

## 2025-05-05 ENCOUNTER — OFFICE VISIT (OUTPATIENT)
Dept: PEDIATRICS | Facility: CLINIC | Age: 40
End: 2025-05-05
Payer: COMMERCIAL

## 2025-05-05 ENCOUNTER — HOSPITAL ENCOUNTER (OUTPATIENT)
Dept: CT IMAGING | Facility: HOSPITAL | Age: 40
Discharge: HOME | End: 2025-05-05
Attending: FAMILY MEDICINE | Admitting: FAMILY MEDICINE
Payer: COMMERCIAL

## 2025-05-05 VITALS
RESPIRATION RATE: 16 BRPM | BODY MASS INDEX: 35.72 KG/M2 | HEIGHT: 63 IN | SYSTOLIC BLOOD PRESSURE: 118 MMHG | WEIGHT: 201.6 LBS | OXYGEN SATURATION: 97 % | TEMPERATURE: 98.6 F | HEART RATE: 105 BPM | DIASTOLIC BLOOD PRESSURE: 77 MMHG

## 2025-05-05 DIAGNOSIS — R10.84 ABDOMINAL PAIN, GENERALIZED: ICD-10-CM

## 2025-05-05 DIAGNOSIS — K52.9 ILEITIS: Primary | ICD-10-CM

## 2025-05-05 DIAGNOSIS — R10.13 ABDOMINAL PAIN, EPIGASTRIC: ICD-10-CM

## 2025-05-05 DIAGNOSIS — R11.2 NAUSEA AND VOMITING, UNSPECIFIED VOMITING TYPE: ICD-10-CM

## 2025-05-05 DIAGNOSIS — R19.7 DIARRHEA, UNSPECIFIED TYPE: ICD-10-CM

## 2025-05-05 LAB
ALBUMIN SERPL-MCNC: 3.9 G/DL (ref 3.4–5)
ALBUMIN UR-MCNC: NEGATIVE MG/DL
ALP SERPL-CCNC: 69 U/L (ref 40–150)
ALT SERPL W P-5'-P-CCNC: 26 U/L (ref 0–50)
ANION GAP SERPL CALCULATED.3IONS-SCNC: 9 MMOL/L (ref 3–14)
APPEARANCE UR: CLEAR
AST SERPL W P-5'-P-CCNC: 30 U/L (ref 0–45)
BACTERIA #/AREA URNS HPF: ABNORMAL /HPF
BASOPHILS # BLD AUTO: 0 10E3/UL (ref 0–0.2)
BASOPHILS NFR BLD AUTO: 0 %
BILIRUB SERPL-MCNC: 0.6 MG/DL (ref 0.2–1.3)
BILIRUB UR QL STRIP: NEGATIVE
BUN SERPL-MCNC: 10 MG/DL (ref 7–30)
CALCIUM SERPL-MCNC: 9.5 MG/DL (ref 8.5–10.1)
CHLORIDE BLD-SCNC: 109 MMOL/L (ref 94–109)
CO2 SERPL-SCNC: 22 MMOL/L (ref 20–32)
COLOR UR AUTO: YELLOW
CREAT SERPL-MCNC: 0.7 MG/DL (ref 0.52–1.04)
CRP SERPL-MCNC: 5.1 MG/L
EGFRCR SERPLBLD CKD-EPI 2021: >90 ML/MIN/1.73M2
EOSINOPHIL # BLD AUTO: 0.1 10E3/UL (ref 0–0.7)
EOSINOPHIL NFR BLD AUTO: 1 %
ERYTHROCYTE [DISTWIDTH] IN BLOOD BY AUTOMATED COUNT: 15.5 % (ref 10–15)
GLUCOSE BLD-MCNC: 104 MG/DL (ref 70–99)
GLUCOSE UR STRIP-MCNC: NEGATIVE MG/DL
HCG UR QL: NEGATIVE
HCT VFR BLD AUTO: 45.1 % (ref 35–47)
HGB BLD-MCNC: 15.6 G/DL (ref 11.7–15.7)
HGB UR QL STRIP: NEGATIVE
IMM GRANULOCYTES # BLD: 0 10E3/UL
IMM GRANULOCYTES NFR BLD: 0 %
KETONES UR STRIP-MCNC: >=80 MG/DL
LEUKOCYTE ESTERASE UR QL STRIP: NEGATIVE
LIPASE SERPL-CCNC: 15 U/L (ref 13–60)
LYMPHOCYTES # BLD AUTO: 1.1 10E3/UL (ref 0.8–5.3)
LYMPHOCYTES NFR BLD AUTO: 8 %
MCH RBC QN AUTO: 31.3 PG (ref 26.5–33)
MCHC RBC AUTO-ENTMCNC: 34.6 G/DL (ref 31.5–36.5)
MCV RBC AUTO: 90 FL (ref 78–100)
MONOCYTES # BLD AUTO: 0.7 10E3/UL (ref 0–1.3)
MONOCYTES NFR BLD AUTO: 5 %
NEUTROPHILS # BLD AUTO: 12.7 10E3/UL (ref 1.6–8.3)
NEUTROPHILS NFR BLD AUTO: 86 %
NITRATE UR QL: NEGATIVE
PH UR STRIP: 8 [PH] (ref 5–8)
PLATELET # BLD AUTO: 368 10E3/UL (ref 150–450)
POTASSIUM BLD-SCNC: 4.6 MMOL/L (ref 3.4–5.3)
PROT SERPL-MCNC: 7.1 G/DL (ref 6.8–8.8)
RBC # BLD AUTO: 4.99 10E6/UL (ref 3.8–5.2)
RBC #/AREA URNS AUTO: ABNORMAL /HPF
SODIUM SERPL-SCNC: 140 MMOL/L (ref 135–145)
SP GR UR STRIP: <=1.005 (ref 1–1.03)
SQUAMOUS #/AREA URNS AUTO: ABNORMAL /LPF
UROBILINOGEN UR STRIP-ACNC: 0.2 E.U./DL
WBC # BLD AUTO: 14.7 10E3/UL (ref 4–11)
WBC #/AREA URNS AUTO: ABNORMAL /HPF

## 2025-05-05 PROCEDURE — 96375 TX/PRO/DX INJ NEW DRUG ADDON: CPT | Performed by: FAMILY MEDICINE

## 2025-05-05 PROCEDURE — 250N000009 HC RX 250: Performed by: FAMILY MEDICINE

## 2025-05-05 PROCEDURE — 36415 COLL VENOUS BLD VENIPUNCTURE: CPT | Performed by: FAMILY MEDICINE

## 2025-05-05 PROCEDURE — 83690 ASSAY OF LIPASE: CPT | Performed by: FAMILY MEDICINE

## 2025-05-05 PROCEDURE — 99215 OFFICE O/P EST HI 40 MIN: CPT | Mod: 25 | Performed by: FAMILY MEDICINE

## 2025-05-05 PROCEDURE — 96374 THER/PROPH/DIAG INJ IV PUSH: CPT | Performed by: FAMILY MEDICINE

## 2025-05-05 PROCEDURE — 250N000011 HC RX IP 250 OP 636: Performed by: FAMILY MEDICINE

## 2025-05-05 PROCEDURE — 74177 CT ABD & PELVIS W/CONTRAST: CPT

## 2025-05-05 PROCEDURE — 3074F SYST BP LT 130 MM HG: CPT | Performed by: FAMILY MEDICINE

## 2025-05-05 PROCEDURE — 81001 URINALYSIS AUTO W/SCOPE: CPT | Performed by: FAMILY MEDICINE

## 2025-05-05 PROCEDURE — 80053 COMPREHEN METABOLIC PANEL: CPT | Performed by: FAMILY MEDICINE

## 2025-05-05 PROCEDURE — 3078F DIAST BP <80 MM HG: CPT | Performed by: FAMILY MEDICINE

## 2025-05-05 PROCEDURE — 1125F AMNT PAIN NOTED PAIN PRSNT: CPT | Performed by: FAMILY MEDICINE

## 2025-05-05 PROCEDURE — 81025 URINE PREGNANCY TEST: CPT | Performed by: FAMILY MEDICINE

## 2025-05-05 PROCEDURE — 85025 COMPLETE CBC W/AUTO DIFF WBC: CPT | Performed by: FAMILY MEDICINE

## 2025-05-05 PROCEDURE — 96361 HYDRATE IV INFUSION ADD-ON: CPT | Performed by: FAMILY MEDICINE

## 2025-05-05 PROCEDURE — 86140 C-REACTIVE PROTEIN: CPT | Performed by: FAMILY MEDICINE

## 2025-05-05 RX ORDER — KETOROLAC TROMETHAMINE 30 MG/ML
30 INJECTION, SOLUTION INTRAMUSCULAR; INTRAVENOUS ONCE
Status: COMPLETED | OUTPATIENT
Start: 2025-05-05 | End: 2025-05-05

## 2025-05-05 RX ORDER — ONDANSETRON 4 MG/1
4 TABLET, ORALLY DISINTEGRATING ORAL EVERY 8 HOURS PRN
Qty: 12 TABLET | Refills: 0 | Status: SHIPPED | OUTPATIENT
Start: 2025-05-05

## 2025-05-05 RX ORDER — ONDANSETRON 2 MG/ML
4 INJECTION INTRAMUSCULAR; INTRAVENOUS
Status: ACTIVE | OUTPATIENT
Start: 2025-05-05 | End: 2025-05-06

## 2025-05-05 RX ORDER — IOPAMIDOL 755 MG/ML
90 INJECTION, SOLUTION INTRAVASCULAR ONCE
Status: COMPLETED | OUTPATIENT
Start: 2025-05-05 | End: 2025-05-05

## 2025-05-05 RX ADMIN — ONDANSETRON 4 MG: 2 INJECTION INTRAMUSCULAR; INTRAVENOUS at 15:55

## 2025-05-05 RX ADMIN — IOPAMIDOL 90 ML: 755 INJECTION, SOLUTION INTRAVENOUS at 16:33

## 2025-05-05 RX ADMIN — SODIUM CHLORIDE 75 ML: 9 INJECTION, SOLUTION INTRAVENOUS at 16:34

## 2025-05-05 RX ADMIN — Medication 1000 ML: at 16:00

## 2025-05-05 RX ADMIN — KETOROLAC TROMETHAMINE 30 MG: 30 INJECTION, SOLUTION INTRAMUSCULAR; INTRAVENOUS at 15:53

## 2025-05-05 ASSESSMENT — PAIN SCALES - GENERAL: PAINLEVEL_OUTOF10: SEVERE PAIN (8)

## 2025-05-05 NOTE — TELEPHONE ENCOUNTER
Nurse Triage SBAR    Is this a 2nd Level Triage? NO    Situation: Abdominal pain, nausea, and vomiting    Background: The patient called in stating she believes she has salmonella and endorses abdominal pain, nausea, and vomiting. She states that she ate some homegrown sprouts. She reports having vomiting and diarrhea with everything she tries to keep down, even saliva at times. This along with her abdominal pain that she likens at time to childbirth, she was recommended to be seen in an ED.    She asked if an urgent care would be appropriate, and she was told she could be seen in an UC if it works for her, but an ED would be preferred. She verbalized difficulty with this due to having children with her and the inability to have them monitored. She was told [facility] could be called to see if they could potentially take her, but that it might be a low chance. She verbalized understanding.     A call was placed to [facility] where the patient's symptoms were relayed, and the provider there stated they could take the patient as long as she was seen by 3 pm. The patient was told this, but states she has to get her some at 3 pm and asked if she could be late by 15 minutes. A call to [facility] was placed to see if this was possible and they stated     Assessment: The patient called in stating she believes she has salmonella and endorses abdominal pain, nausea, and vomiting. She states that she ate some homegrown sprouts. She reports having vomiting and diarrhea with everything she tries to keep down, even saliva at times. This along with her abdominal pain that she likens at time to childbirth, she was recommended to be seen in an ED.    She asked if an urgent care would be appropriate, and she was told she could be seen in an UC if it works for her, but an ED would be preferred. She verbalized difficulty with this due to having children with her and the inability to have them monitored. She was told [facility] could be called to see if they  could potentially take her, but that it might be a low chance. She verbalized understanding.     A call was placed to [facility] where the patient's symptoms were relayed, and the provider there stated they could take the patient as long as she was seen by 3 pm. The patient was told this, but states she has to get her some at 3 pm and asked if she could be late by 15 minutes. A call to [facility] was placed to see if this was possible and they stated they could see her. The patient was called and told she needs to make sure she gets there as quickly as possible so her evaluation can take place in a timely manner.    Protocol Recommended Disposition:   Go to ED Now    Recommendation: Go to ED Now       Does the patient meet one of the following criteria for [facility] visit consideration? 16+ years old, with an [facility] PCP     TIP  Providers, please consider if this condition is appropriate for management at one of our Acute and Diagnostic Services sites.     If patient is a good candidate, please use dotphrase <dot>triageresponse and select Refer to [facility] to document.    Reason for Disposition   SEVERE abdominal pain (e.g., excruciating)    Additional Information   Negative: Passed out (e.g., fainted, lost consciousness, blacked out and was not responding)   Negative: Shock suspected (e.g., cold/pale/clammy skin, too weak to stand, low BP, rapid pulse)   Negative: Sounds like a life-threatening emergency to the triager   Negative: Followed an abdomen (stomach) injury   Negative: Chest pain   Negative: Abdominal pain and pregnant < 20 weeks   Negative: Abdominal pain and pregnant 20 or more weeks   Negative: Pain is mainly in upper abdomen (if needed ask: 'is it mainly above the belly button?')   Negative: Abdomen bloating or swelling are main symptoms    Protocols used: Abdominal Pain - Female-A-[place]    Donny Moore RN  [facility]

## 2025-05-05 NOTE — PROGRESS NOTES
Acute and Diagnostic Services Clinic Visit    Assessment & Plan     Ileitis  Patient with sudden severe abdominal pain which came in waves, nausea, vomiting and diarrhea today.  Reviewed results of all labs and imaging with patient today.  CT scan shows terminal ileitis which could be of inflammatory or infectious nature.  Stool culture pending  I did order a CRP to get a baseline in case she needs follow-up.  That is still pending as well.  Patient felt much better after Toradol and Zofran as well as 1 L IV normal saline.  Recommended:  Push fluids  Zofran every 8 hours as needed for nausea and vomiting  Acetaminophen (Tylenol) 500mg tablets.  You may take 2 tabs every 4-6 hours as needed  Ibuprofen (Advil, Motrin) 200mg tablets.  You may take 3 tabs every 6-8 hours as needed with food.  Warm pack or heating pad to abdomen as needed.  Do not fall asleep on heating pad.  Collect stool sample and drop it off at any [facility] lab site.  Stay home until you are feeling better.  Preferably 24 hours of no vomiting or diarrhea.  CT scan results to follow up with primary care provider:  Kidney lesion too small to characterize.  Consider repeat CT scan in 1-2 years to follow this.  No official recommendation for follow up from radiology.  Gall stone - only needs follow up if you are having pain right under your right rib cage.  Right ovarian cyst - only needs follow up if you are having pain.  - CRP, inflammation; Future    Abdominal pain, epigastric  See above  Incidental 2.6 cm gallstone found on CT.  Gall stone - only needs follow up if you are having pain right under your right rib cage.  - sodium chloride 0.9% BOLUS 1,000 mL  - ketorolac (TORADOL) injection 30 mg  - ondansetron (ZOFRAN) injection 4 mg  - sodium chloride (PF) 0.9% PF flush 3 mL  - CBC with platelets differential; Future  - Comprehensive metabolic panel; Future  - Lipase; Future  - UA with Microscopic reflex to Culture; Future  - HCG qualitative  urine; Future  - CT Abdomen Pelvis w Contrast; Future  - Enteric Bacteria and Virus Panel by [facility] Stool; Future  - CBC with platelets differential  - Comprehensive metabolic panel  - Lipase  - UA with Microscopic reflex to Culture  - HCG qualitative urine  - Enteric Bacteria and Virus Panel by [facility] Stool  - UA Microscopic with Reflex to Culture    Abdominal pain, generalized  See above  - sodium chloride 0.9% BOLUS 1,000 mL  - ketorolac (TORADOL) injection 30 mg  - ondansetron (ZOFRAN) injection 4 mg  - sodium chloride (PF) 0.9% PF flush 3 mL  - CBC with platelets differential; Future  - Comprehensive metabolic panel; Future  - Lipase; Future  - UA with Microscopic reflex to Culture; Future  - HCG qualitative urine; Future  - CT Abdomen Pelvis w Contrast; Future  - Enteric Bacteria and Virus Panel by [facility] Stool; Future  - CBC with platelets differential  - Comprehensive metabolic panel  - Lipase  - UA with Microscopic reflex to Culture  - HCG qualitative urine  - Enteric Bacteria and Virus Panel by [facility] Stool  - UA Microscopic with Reflex to Culture    Nausea and vomiting, unspecified vomiting type  Patient felt better with Zofran  - sodium chloride 0.9% BOLUS 1,000 mL  - ketorolac (TORADOL) injection 30 mg  - ondansetron (ZOFRAN) injection 4 mg  - sodium chloride (PF) 0.9% PF flush 3 mL  - CBC with platelets differential; Future  - Comprehensive metabolic panel; Future  - Lipase; Future  - UA with Microscopic reflex to Culture; Future  - HCG qualitative urine; Future  - CT Abdomen Pelvis w Contrast; Future  - Enteric Bacteria and Virus Panel by [facility] Stool; Future  - CBC with platelets differential  - Comprehensive metabolic panel  - Lipase  - UA with Microscopic reflex to Culture  - HCG qualitative urine  - Enteric Bacteria and Virus Panel by [facility] Stool  - UA Microscopic with Reflex to Culture  - ondansetron (ZOFRAN ODT) 4 MG ODT tab; Take 1 tablet (4 mg) by mouth every 8 hours as needed for nausea or  "vomiting.    Diarrhea, unspecified type  See above  Stool culture ordered  - sodium chloride 0.9% BOLUS 1,000 mL  - ketorolac (TORADOL) injection 30 mg  - ondansetron (ZOFRAN) injection 4 mg  - sodium chloride (PF) 0.9% PF flush 3 mL  - CBC with platelets differential; Future  - Comprehensive metabolic panel; Future  - Lipase; Future  - UA with Microscopic reflex to Culture; Future  - HCG qualitative urine; Future  - CT Abdomen Pelvis w Contrast; Future  - Enteric Bacteria and Virus Panel by [facility] Stool; Future  - CBC with platelets differential  - Comprehensive metabolic panel  - Lipase  - UA with Microscopic reflex to Culture  - HCG qualitative urine  - Enteric Bacteria and Virus Panel by [facility] Stool  - UA Microscopic with Reflex to Culture    Over 50 minutes were spent doing chart review, history and exam, documentation and further activities per the note.       BMI  Estimated body mass index is 35.43 kg/m  as calculated from the following:    Height as of this encounter: 1.607 m (5' 3.25\").    Weight as of this encounter: 91.4 kg (201 lb 9.6 oz).   Weight management plan: Not addressed at today's visit          Cookie Soria is a 39 year old, presenting for the following health issues:  Abdominal Pain    HPI      Abdominal/Flank Pain  Onset/Duration: this morning around 8:15 AM  Description:   Character: Sharp and shooting, comes and goes  Location: upper area and sometimes lower area depending on if I have something in my stomach or not  Radiation: None  Intensity: 7-8/10  Progression of Symptoms:  worsening  Accompanying Signs & Symptoms:  Fever/chills: YES- chills but after vomiting  Gas/Bloating: no   Nausea: YES  Vomitting: YES  Diarrhea: YES  Constipation:no   Dysuria: no            Hematuria: no            Frequency: no            Incontinence of urine: no   History:            Last bowel movement:  diarrhea - all day every 20 or 30 minutes, last one about 1 hour ago  Trauma: no   Previous similar pain: " "no    Previous tests done: none           Previous Abdominal surgery: YES- endometriosis  Precipitating factors:   Does the pain change with:     Food: cannot keep anything down, the second I put anything in my body, it's pain and comes back up or out/both    Bowel Movement: YES    Urination: no              Other factors: no   Therapies tried and outcome:  pepto but that did not help    When food last eaten: 10 AM but did not stay down, stopped trying to eat and drink    Patient is a 39-year-old female who has had severe abdominal pain and diarrhea since 815 this morning.  About every 20 minutes she gets severe abdominal pain and cramping in the epigastric area and generally all over the abdomen.  She has been having diarrhea with no blood in the stool.  She has been vomiting since noon today.  She is feeling some chills and hot flashes but no fevers.  She is not able to eat or drink anything due to the nausea and vomiting.  Her throat is starting to hurt from vomiting but she has no sore throat, rhinitis or cough.  She has some chronic back pain but no new back pain.  LMP was mid April.  She did eat sprouts grown at home yesterday but she has been doing that for about a year.  No recent hospitalizations or antibiotics.    Review of Systems  Negative except as listed in HPI      Objective    /77 (BP Location: Right arm, Patient Position: Sitting, Cuff Size: Adult Regular)   Pulse 105   Temp 98.6  F (37  C) (Oral)   Resp 16   Ht 1.607 m (5' 3.25\")   Wt 91.4 kg (201 lb 9.6 oz)   SpO2 97%   BMI 35.43 kg/m    Body mass index is 35.43 kg/m .  Physical Exam   Vitals are noted and within normal limits except for BMI of 35  In general she is alert, oriented, and in no acute distress except for when she is in abdominal pain at which time she is in severe distress  Mouth mucous members are pink and dry  Heart regular rate and rhythm without murmur  Lungs clear to auscultation bilaterally with good air " entry  Back with no CVA tenderness  Abdomen with normal bowel sounds, soft and nondistended.  General tenderness to palpation of the abdomen with no 1 focal spot.  CBC: Elevated white count with elevated absolute neutrophils.  Normal hemoglobin and platelets.  CMP: Within normal limits  Lipase: Pending  UA: No sign of infection  UPT: Negative  CT abdomen/pelvis: Large segment of terminal ileum with wall thickening consistent with inflammation or infection.  Incidental findings include 2.6 cm gallstone but no sign of cholecystitis.  Small lesion in kidney which is too small to characterize.  4.4 cm right ovarian cyst.  Stool culture pending  Results for orders placed or performed during the hospital encounter of 05/05/25   CT Abdomen Pelvis w Contrast     Status: None    Narrative    EXAM: CT ABDOMEN PELVIS W CONTRAST  LOCATION: [facility]  DATE: 5/5/2025    INDICATION:  Abdominal pain, epigastric, Abdominal pain, generalized, Nausea and vomiting, unspecified vomiting type, Diarrhea, unspecified type  COMPARISON: None.  TECHNIQUE: CT scan of the abdomen and pelvis was performed following injection of IV contrast. Multiplanar reformats were obtained. Dose reduction techniques were used.  CONTRAST: 90mL isovue 370    FINDINGS:   LOWER CHEST: Unremarkable.    HEPATOBILIARY: Liver appears normal. 2.6 cm gallstone in the gallbladder. No gallbladder wall thickening or pericholecystic fluid. No biliary dilatation.    PANCREAS: Normal.    SPLEEN: Normal.    ADRENAL GLANDS: Normal.    KIDNEYS/BLADDER: 0.8 cm low-attenuation lesion in the right renal midpole, too small to characterize. Kidneys otherwise appear normal. No hydronephrosis. Urinary bladder appears normal.    BOWEL: Mild wall thickening involving a long segment of the distal ileum. Trace pelvic free fluid. Remainder of the small bowel appears normal. No bowel obstruction. Moderate colonic diverticulosis. No evidence of acute diverticulitis.  Appendix not   visualized, although no secondary signs of acute appendicitis.    LYMPH NODES: No lymphadenopathy.    VASCULATURE: Unremarkable.    PELVIC ORGANS: Right ovarian simple cyst measuring 4.4 cm. Uterus and left ovary appear unremarkable.    MUSCULOSKELETAL: Mild multilevel degenerative changes. Tiny fat-containing hernia.      Impression    IMPRESSION:   1.  Mild wall thickening of the distal ileum, likely infectious or inflammatory ileitis. Trace pelvic free fluid, likely reactive.   Results for orders placed or performed in visit on 05/05/25   Comprehensive metabolic panel     Status: Abnormal   Result Value Ref Range    Sodium 140 135 - 145 mmol/L    Potassium 4.6 3.4 - 5.3 mmol/L    Chloride 109 94 - 109 mmol/L    Carbon Dioxide (CO2) 22 20 - 32 mmol/L    Anion Gap 9 3 - 14 mmol/L    Urea Nitrogen 10 7 - 30 mg/dL    Creatinine 0.70 0.52 - 1.04 mg/dL    GFR Estimate >90 >60 mL/min/1.73m2    Calcium 9.5 8.5 - 10.1 mg/dL    Glucose 104 (H) 70 - 99 mg/dL    Alkaline Phosphatase 69 40 - 150 U/L    AST 30 0 - 45 U/L    ALT 26 0 - 50 U/L    Protein Total 7.1 6.8 - 8.8 g/dL    Albumin 3.9 3.4 - 5.0 g/dL    Bilirubin Total 0.6 0.2 - 1.3 mg/dL   UA with Microscopic reflex to Culture     Status: Abnormal    Specimen: Urine, Clean Catch   Result Value Ref Range    Color Urine Yellow Colorless, Straw, Light Yellow, Yellow    Appearance Urine Clear Clear    Glucose Urine Negative Negative mg/dL    Bilirubin Urine Negative Negative    Ketones Urine >=80 (A) Negative mg/dL    Specific Gravity Urine <=1.005 1.005 - 1.030    Blood Urine Negative Negative    pH Urine 8.0 5.0 - 8.0    Protein Albumin Urine Negative Negative mg/dL    Urobilinogen Urine 0.2 0.2, 1.0 E.U./dL    Nitrite Urine Negative Negative    Leukocyte Esterase Urine Negative Negative   HCG qualitative urine     Status: Normal   Result Value Ref Range    hCG Urine Qualitative Negative Negative   CBC with platelets and differential     Status: Abnormal    Result Value Ref Range    WBC Count 14.7 (H) 4.0 - 11.0 10e3/uL    RBC Count 4.99 3.80 - 5.20 10e6/uL    Hemoglobin 15.6 11.7 - 15.7 g/dL    Hematocrit 45.1 35.0 - 47.0 %    MCV 90 78 - 100 fL    MCH 31.3 26.5 - 33.0 pg    MCHC 34.6 31.5 - 36.5 g/dL    RDW 15.5 (H) 10.0 - 15.0 %    Platelet Count 368 150 - 450 10e3/uL    % Neutrophils 86 %    % Lymphocytes 8 %    % Monocytes 5 %    % Eosinophils 1 %    % Basophils 0 %    % Immature Granulocytes 0 %    Absolute Neutrophils 12.7 (H) 1.6 - 8.3 10e3/uL    Absolute Lymphocytes 1.1 0.8 - 5.3 10e3/uL    Absolute Monocytes 0.7 0.0 - 1.3 10e3/uL    Absolute Eosinophils 0.1 0.0 - 0.7 10e3/uL    Absolute Basophils 0.0 0.0 - 0.2 10e3/uL    Absolute Immature Granulocytes 0.0 <=0.4 10e3/uL   UA Microscopic with Reflex to Culture     Status: Abnormal   Result Value Ref Range    Bacteria Urine Few (A) None Seen /HPF    RBC Urine 0-2 0-2 /HPF /HPF    WBC Urine 0-5 0-5 /HPF /HPF    Squamous Epithelials Urine Moderate (A) None Seen /LPF    Narrative    Urine Culture not indicated   CBC with platelets differential     Status: Abnormal    Narrative    The following orders were created for panel order CBC with platelets differential.  Procedure                               Abnormality         Status                     ---------                               -----------         ------                     CBC with platelets and ...[[id number]]  Abnormal            Final result                 Please view results for these tests on the individual orders.                 Signed Electronically by: Elsy Man MD

## 2025-05-05 NOTE — PATIENT INSTRUCTIONS
Push fluids    Zofran every 8 hours as needed for nausea and vomiting    Acetaminophen (Tylenol) 500mg tablets.  You may take 2 tabs every 4-6 hours as needed  Ibuprofen (Advil, Motrin) 200mg tablets.  You may take 3 tabs every 6-8 hours as needed with food.    Warm pack or heating pad to abdomen as needed.  Do not fall asleep on heating pad.    Collect stool sample and drop it off at any [facility] lab site.    Stay home until you are feeling better.  Preferably 24 hours of no vomiting or diarrhea.    CT scan results to follow up with primary care provider:  Kidney lesion too small to characterize.  Consider repeat CT scan in 1-2 years to follow this.  No official recommendation for follow up from radiology.  Gall stone - only needs follow up if you are having pain right under your right rib cage.  Right ovarian cyst - only needs follow up if you are having pain.

## 2025-05-06 LAB

## 2025-05-06 PROCEDURE — 87507 IADNA-DNA/RNA PROBE TQ 12-25: CPT | Performed by: FAMILY MEDICINE

## 2025-05-07 ENCOUNTER — RESULTS FOLLOW-UP (OUTPATIENT)
Dept: PEDIATRICS | Facility: CLINIC | Age: 40
End: 2025-05-07

## 2025-07-13 ENCOUNTER — HEALTH MAINTENANCE LETTER (OUTPATIENT)
Age: 40
End: 2025-07-13